# Patient Record
Sex: MALE | Race: WHITE | Employment: UNEMPLOYED | URBAN - NONMETROPOLITAN AREA
[De-identification: names, ages, dates, MRNs, and addresses within clinical notes are randomized per-mention and may not be internally consistent; named-entity substitution may affect disease eponyms.]

---

## 2022-01-31 ENCOUNTER — OFFICE VISIT (OUTPATIENT)
Dept: FAMILY MEDICINE CLINIC | Age: 52
End: 2022-01-31
Payer: COMMERCIAL

## 2022-01-31 VITALS
HEIGHT: 72 IN | TEMPERATURE: 97.7 F | BODY MASS INDEX: 40.36 KG/M2 | WEIGHT: 298 LBS | DIASTOLIC BLOOD PRESSURE: 96 MMHG | HEART RATE: 93 BPM | OXYGEN SATURATION: 97 % | SYSTOLIC BLOOD PRESSURE: 148 MMHG

## 2022-01-31 DIAGNOSIS — I10 PRIMARY HYPERTENSION: ICD-10-CM

## 2022-01-31 DIAGNOSIS — E78.5 HYPERLIPIDEMIA, UNSPECIFIED HYPERLIPIDEMIA TYPE: ICD-10-CM

## 2022-01-31 DIAGNOSIS — G89.29 CHRONIC BACK PAIN, UNSPECIFIED BACK LOCATION, UNSPECIFIED BACK PAIN LATERALITY: ICD-10-CM

## 2022-01-31 DIAGNOSIS — G47.33 OSA ON CPAP: ICD-10-CM

## 2022-01-31 DIAGNOSIS — F33.41 RECURRENT MAJOR DEPRESSIVE DISORDER, IN PARTIAL REMISSION (HCC): ICD-10-CM

## 2022-01-31 DIAGNOSIS — M54.9 CHRONIC BACK PAIN, UNSPECIFIED BACK LOCATION, UNSPECIFIED BACK PAIN LATERALITY: ICD-10-CM

## 2022-01-31 DIAGNOSIS — Z79.4 TYPE 2 DIABETES MELLITUS WITHOUT COMPLICATION, WITH LONG-TERM CURRENT USE OF INSULIN (HCC): Primary | ICD-10-CM

## 2022-01-31 DIAGNOSIS — E11.9 TYPE 2 DIABETES MELLITUS WITHOUT COMPLICATION, WITH LONG-TERM CURRENT USE OF INSULIN (HCC): Primary | ICD-10-CM

## 2022-01-31 DIAGNOSIS — Z12.11 SCREENING FOR MALIGNANT NEOPLASM OF COLON: ICD-10-CM

## 2022-01-31 DIAGNOSIS — Z99.89 OSA ON CPAP: ICD-10-CM

## 2022-01-31 LAB — HBA1C MFR BLD: 6.5 %

## 2022-01-31 PROCEDURE — 83036 HEMOGLOBIN GLYCOSYLATED A1C: CPT | Performed by: FAMILY MEDICINE

## 2022-01-31 PROCEDURE — 99204 OFFICE O/P NEW MOD 45 MIN: CPT | Performed by: FAMILY MEDICINE

## 2022-01-31 RX ORDER — DICLOFENAC POTASSIUM 50 MG/1
50 TABLET, FILM COATED ORAL 2 TIMES DAILY
Qty: 180 TABLET | Refills: 1 | Status: SHIPPED | OUTPATIENT
Start: 2022-01-31

## 2022-01-31 RX ORDER — LISINOPRIL 20 MG/1
20 TABLET ORAL DAILY
Qty: 90 TABLET | Refills: 1 | Status: SHIPPED | OUTPATIENT
Start: 2022-01-31 | End: 2022-02-16 | Stop reason: SDUPTHER

## 2022-01-31 RX ORDER — ATORVASTATIN CALCIUM 80 MG/1
80 TABLET, FILM COATED ORAL DAILY
COMMUNITY
End: 2022-01-31 | Stop reason: SDUPTHER

## 2022-01-31 RX ORDER — ATORVASTATIN CALCIUM 80 MG/1
80 TABLET, FILM COATED ORAL DAILY
Qty: 90 TABLET | Refills: 1 | Status: SHIPPED | OUTPATIENT
Start: 2022-01-31 | End: 2022-08-08

## 2022-01-31 RX ORDER — DICLOFENAC POTASSIUM 50 MG/1
50 TABLET, FILM COATED ORAL 2 TIMES DAILY
COMMUNITY
End: 2022-01-31 | Stop reason: SDUPTHER

## 2022-01-31 ASSESSMENT — PATIENT HEALTH QUESTIONNAIRE - PHQ9
SUM OF ALL RESPONSES TO PHQ QUESTIONS 1-9: 22
3. TROUBLE FALLING OR STAYING ASLEEP: 3
6. FEELING BAD ABOUT YOURSELF - OR THAT YOU ARE A FAILURE OR HAVE LET YOURSELF OR YOUR FAMILY DOWN: 3
SUM OF ALL RESPONSES TO PHQ QUESTIONS 1-9: 24
SUM OF ALL RESPONSES TO PHQ9 QUESTIONS 1 & 2: 6
SUM OF ALL RESPONSES TO PHQ QUESTIONS 1-9: 24
9. THOUGHTS THAT YOU WOULD BE BETTER OFF DEAD, OR OF HURTING YOURSELF: 2
7. TROUBLE CONCENTRATING ON THINGS, SUCH AS READING THE NEWSPAPER OR WATCHING TELEVISION: 3
2. FEELING DOWN, DEPRESSED OR HOPELESS: 3
1. LITTLE INTEREST OR PLEASURE IN DOING THINGS: 3
4. FEELING TIRED OR HAVING LITTLE ENERGY: 3
10. IF YOU CHECKED OFF ANY PROBLEMS, HOW DIFFICULT HAVE THESE PROBLEMS MADE IT FOR YOU TO DO YOUR WORK, TAKE CARE OF THINGS AT HOME, OR GET ALONG WITH OTHER PEOPLE: 3
5. POOR APPETITE OR OVEREATING: 2
8. MOVING OR SPEAKING SO SLOWLY THAT OTHER PEOPLE COULD HAVE NOTICED. OR THE OPPOSITE, BEING SO FIGETY OR RESTLESS THAT YOU HAVE BEEN MOVING AROUND A LOT MORE THAN USUAL: 2
SUM OF ALL RESPONSES TO PHQ QUESTIONS 1-9: 24

## 2022-01-31 ASSESSMENT — COLUMBIA-SUICIDE SEVERITY RATING SCALE - C-SSRS
2. HAVE YOU ACTUALLY HAD ANY THOUGHTS OF KILLING YOURSELF?: NO
1. WITHIN THE PAST MONTH, HAVE YOU WISHED YOU WERE DEAD OR WISHED YOU COULD GO TO SLEEP AND NOT WAKE UP?: YES
6. HAVE YOU EVER DONE ANYTHING, STARTED TO DO ANYTHING, OR PREPARED TO DO ANYTHING TO END YOUR LIFE?: NO

## 2022-01-31 NOTE — PROGRESS NOTES
Luis PAZ [address]  Dept: [phone]  Dept Fax: [phone]    Visit type: New patient    Reason for Visit: Establish Care, Referral - General (GI-Due for ClnScope), Sleep Apnea (needs new machine/mask), and Diabetes         Assessment and Plan       1. Type 2 diabetes mellitus without complication, with long-term current use of insulin (HCC)  -     insulin 70-30 (HUMULIN;NOVOLIN) (70-30) 100 UNIT per ML injection vial; Inject 100 Units into the skin 2 times daily, Disp-10 mL, R-5Normal  -     POCT glycosylated hemoglobin (Hb A1C)  2. Primary hypertension  -     lisinopril (PRINIVIL;ZESTRIL) 20 MG tablet; Take 1 tablet by mouth daily, Disp-90 tablet, R-1Normal  3. Hyperlipidemia, unspecified hyperlipidemia type  -     atorvastatin (LIPITOR) 80 MG tablet; Take 1 tablet by mouth daily, Disp-90 tablet, R-1Normal  4. Chronic back pain, unspecified back location, unspecified back pain laterality  -     diclofenac (CATAFLAM) 50 MG tablet; Take 1 tablet by mouth 2 times daily, Disp-180 tablet, R-1Normal  5. LI on CPAP  -     Misc. Devices MISC; ONCE Starting Mon 1/31/2022, For 1 dose, Disp-1 each, R-5, Print  6. Recurrent major depressive disorder, in partial remission ([facility] [address]  7. Screening for malignant neoplasm of colon  -     Lorenzo Mcgowan MD, Gastroenterology, [place]    Discussed with patient his point-of-care A1c was 6.5 and with him tolerating and doing well with his 70/30 regiment and wishes to continue that we will do so at this time but discussed potential difficulties with the use of that medication and benefits of separate short acting and long-acting insulin if his diabetes becomes more difficult to control and understanding was voiced. Stressed importance of being compliant with his diabetic diet.   Discussed need for continued home monitoring of his blood pressure and discussed his blood pressure today in office and the possible need for medication adjustments moving forward. With patient benefiting from his diclofenac will continue at this time and continue to monitor. With patient being compliant and benefiting from his use of his CPAP for his obstructive sleep apnea, I would recommend continued use of his CPAP machine as it is providing him benefit to his sleep apnea. Discussed his mood and depression and the possibility of medication efforts to improve his symptoms as well as the possibility of evolving psychologist and/or other psychiatric specialist in his care and patient declined at this time as is wanting continue to monitor. Discussed signs symptoms require medical attention. All questions were answered and patient voiced understanding and agreement with plan as discussed. Return in about 3 months (around 4/30/2022), or if symptoms worsen or fail to improve. Subjective       HPI   Patient reports that he used to live in [place] but moved here about 3 months ago. He states that he had to sell his house due to the cost of living in the expenses and because of that has been more down and depressed but states that he is doing okay and believes is more of just an adjustment. Than anything else. He does state that has been a lot of depression medication in the past and nothing is worked well for him and he is not interested in pursuing any medication at this time for management of his mood. He states that he feels like he is leveled out and is doing okay. He does state that he has a history of sleep apnea and is in need of a new mask and tubing. He states that he is pretty much is using the mask and tubing that he has been using for a year or longer and is starting to wear out. He reports that he is compliant with his CPAP machine use and is using it nightly and does benefit from the use of the machine.   He states that he is able to wake up feeling rested and denies any daytime somnolence with the use of the CPAP machine. Patient has a history of type 2 diabetes and is doing well with his current medication. He states that he is using 70/30 insulin and has been able to control his diabetes without any problems. He does state that he has to be diligent with his diet and when he overdoes it his blood sugars will run a little bit high for a day or so but then will more so normalize with continued use of his medicine. he denies any issues with the use of the medicine. he denies any new symptoms associated with his diabetes. he is attempting to be diligent with his diet and compliant with his medication. Patient has arterial hypertension that is doing well with his current medication. he denies any issues with use of his  medicine. he denies any symptoms associated with abnormal blood pressures. he is attempting to avoid excess salt intake. Patient has hyperlipidemia and reports that he is tolerating his Lipitor without any new myalgias or GI upsets. he is attempting to watch his diet and trying to stay physically active. He does have chronic back pain and states that he has been taking diclofenac and that has done well for his back pain symptoms. He denies any problems with medicine or any new injuries or recent changes to his back pain. Review of Systems   Constitutional: Negative for activity change, appetite change, fatigue and fever. HENT: Negative for congestion, rhinorrhea and sore throat. Eyes: Negative for pain and discharge. Respiratory: Negative for cough, shortness of breath and wheezing. Cardiovascular: Negative for chest pain and palpitations. Gastrointestinal: Negative for abdominal pain, constipation, diarrhea, nausea and vomiting. Endocrine: Negative for cold intolerance and heat intolerance. Genitourinary: Negative for dysuria and hematuria. Musculoskeletal: Positive for back pain. Negative for gait problem and neck pain. Skin: Negative for rash and wound. Neurological: Negative for syncope and weakness. Hematological: Negative for adenopathy. Does not bruise/bleed easily. Psychiatric/Behavioral: Positive for dysphoric mood. Negative for sleep disturbance. The patient is not nervous/anxious. No Known Allergies    Outpatient Medications Prior to Visit   Medication Sig Dispense Refill    insulin 70-30 (HUMULIN;NOVOLIN) (70-30) 100 UNIT per ML injection vial Inject 100 Units into the skin 2 times daily      atorvastatin (LIPITOR) 80 MG tablet Take 80 mg by mouth daily      diclofenac (CATAFLAM) 50 MG tablet Take 50 mg by mouth 2 times daily       No facility-administered medications prior to visit. Past Medical History:   Diagnosis Date    Anxiety     Depression     Neuropathy     Obstructive sleep apnea syndrome     Panic attacks     Primary hypertension     Type 2 diabetes mellitus without complication, with long-term current use of insulin ([facility])         Social History     Tobacco Use    Smoking status: Never Smoker    Smokeless tobacco: Never Used   Substance Use Topics    Alcohol use: Not Currently        History reviewed. No pertinent surgical history. No family history on file. Objective       BP (!) 148/96 (Site: Left Upper Arm, Position: Sitting, Cuff Size: Large Adult)   Pulse 93   Temp 97.7 °F (36.5 °C) (Temporal)   Ht 6' (1.829 m)   Wt 298 lb (135.2 kg)   SpO2 97%   BMI 40.42 kg/m²   Physical Exam  Vitals and nursing note reviewed. Constitutional:       General: He is not in acute distress. Appearance: Normal appearance. He is well-developed. He is not diaphoretic. HENT:      Head: Normocephalic and atraumatic. Right Ear: External ear normal.      Left Ear: External ear normal.      Mouth/Throat:      Comments: Mask in place  Eyes:      General: No scleral icterus. Right eye: No discharge. Left eye: No discharge.       Conjunctiva/sclera: Conjunctivae normal.   Neck:      Trachea: No tracheal deviation. Cardiovascular:      Rate and Rhythm: Normal rate and regular rhythm. Heart sounds: Normal heart sounds. No murmur heard. No friction rub. No gallop. Pulmonary:      Effort: Pulmonary effort is normal. No respiratory distress. Breath sounds: Normal breath sounds. No wheezing or rales. Abdominal:      General: Bowel sounds are normal. There is no distension. Palpations: Abdomen is soft. Tenderness: There is no abdominal tenderness. Musculoskeletal:         General: No deformity (No gross deformities of upper or lower extremities) or signs of injury. Cervical back: Normal range of motion and neck supple. No muscular tenderness. Right lower leg: No edema. Left lower leg: No edema. Comments: No appreciable spinal tenderness   Lymphadenopathy:      Cervical: No cervical adenopathy. Skin:     General: Skin is warm and dry. Findings: No erythema or rash. Neurological:      Mental Status: He is alert and oriented to person, place, and time. Cranial Nerves: No cranial nerve deficit. Psychiatric:         Behavior: Behavior normal.         Thought Content:  Thought content normal.           Data Reviewed and Summarized       Labs:     Imaging/Testing:        Werner Campbell MD

## 2022-02-04 PROBLEM — F33.41 RECURRENT MAJOR DEPRESSIVE DISORDER, IN PARTIAL REMISSION (HCC): Status: ACTIVE | Noted: 2022-02-04

## 2022-02-04 PROBLEM — E11.9 TYPE 2 DIABETES MELLITUS WITHOUT COMPLICATION, WITH LONG-TERM CURRENT USE OF INSULIN (HCC): Status: ACTIVE | Noted: 2022-02-04

## 2022-02-04 PROBLEM — Z99.89 OSA ON CPAP: Status: ACTIVE | Noted: 2022-02-04

## 2022-02-04 PROBLEM — M54.9 CHRONIC BACK PAIN: Status: ACTIVE | Noted: 2022-02-04

## 2022-02-04 PROBLEM — G89.29 CHRONIC BACK PAIN: Status: ACTIVE | Noted: 2022-02-04

## 2022-02-04 PROBLEM — G47.33 OSA ON CPAP: Status: ACTIVE | Noted: 2022-02-04

## 2022-02-04 PROBLEM — I10 PRIMARY HYPERTENSION: Status: ACTIVE | Noted: 2022-02-04

## 2022-02-04 PROBLEM — Z79.4 TYPE 2 DIABETES MELLITUS WITHOUT COMPLICATION, WITH LONG-TERM CURRENT USE OF INSULIN (HCC): Status: ACTIVE | Noted: 2022-02-04

## 2022-02-04 PROBLEM — E78.5 HYPERLIPIDEMIA: Status: ACTIVE | Noted: 2022-02-04

## 2022-02-04 ASSESSMENT — ENCOUNTER SYMPTOMS
BACK PAIN: 1
EYE PAIN: 0
RHINORRHEA: 0
SORE THROAT: 0
VOMITING: 0
SHORTNESS OF BREATH: 0
DIARRHEA: 0
COUGH: 0
EYE DISCHARGE: 0
WHEEZING: 0
ABDOMINAL PAIN: 0
NAUSEA: 0
CONSTIPATION: 0

## 2022-02-04 NOTE — PATIENT INSTRUCTIONS

## 2022-02-09 ENCOUNTER — PATIENT MESSAGE (OUTPATIENT)
Dept: FAMILY MEDICINE CLINIC | Age: 52
End: 2022-02-09

## 2022-02-09 DIAGNOSIS — E11.9 TYPE 2 DIABETES MELLITUS WITHOUT COMPLICATION, WITH LONG-TERM CURRENT USE OF INSULIN (HCC): ICD-10-CM

## 2022-02-09 DIAGNOSIS — Z79.4 TYPE 2 DIABETES MELLITUS WITHOUT COMPLICATION, WITH LONG-TERM CURRENT USE OF INSULIN (HCC): ICD-10-CM

## 2022-02-10 NOTE — TELEPHONE ENCOUNTER
Last OV 1/31/2022  Next OV Visit date not found      Requested Prescriptions     Pending Prescriptions Disp Refills    insulin 70-30 (HUMULIN;NOVOLIN) (70-30) 100 UNIT per ML injection vial 6 each 5     Sig: Inject 100 Units into the skin 2 times daily

## 2022-02-10 NOTE — TELEPHONE ENCOUNTER
From: Mitra Camp  To: Dr. Tate Chester  Sent: 2/9/2022 11:12 AM CST  Subject: Insulin prescription     Please check my insulin. Prescription. I need 200 a day. ..they only gave me 3 vials. ..need a preauthorzation? For more? [facility]    Also can I get a dexcom device? Thanks!

## 2022-02-14 ENCOUNTER — TELEPHONE (OUTPATIENT)
Dept: FAMILY MEDICINE CLINIC | Age: 52
End: 2022-02-14

## 2022-02-14 DIAGNOSIS — Z11.52 ENCOUNTER FOR SCREENING FOR COVID-19: Primary | ICD-10-CM

## 2022-02-14 DIAGNOSIS — E11.9 TYPE 2 DIABETES MELLITUS WITHOUT COMPLICATION, WITH LONG-TERM CURRENT USE OF INSULIN (HCC): Primary | ICD-10-CM

## 2022-02-14 DIAGNOSIS — Z79.4 TYPE 2 DIABETES MELLITUS WITHOUT COMPLICATION, WITH LONG-TERM CURRENT USE OF INSULIN (HCC): Primary | ICD-10-CM

## 2022-02-14 RX ORDER — BLOOD-GLUCOSE,RECEIVER,CONT
1 EACH MISCELLANEOUS ONCE
Qty: 1 EACH | Refills: 0 | Status: SHIPPED | OUTPATIENT
Start: 2022-02-14 | End: 2022-02-14

## 2022-02-14 RX ORDER — BLOOD-GLUCOSE TRANSMITTER
1 EACH MISCELLANEOUS CONTINUOUS
Qty: 3 EACH | Refills: 3 | Status: SHIPPED | OUTPATIENT
Start: 2022-02-14 | End: 2022-02-16 | Stop reason: SDUPTHER

## 2022-02-14 RX ORDER — BLOOD-GLUCOSE SENSOR
EACH MISCELLANEOUS
Qty: 3 EACH | Refills: 3 | Status: SHIPPED | OUTPATIENT
Start: 2022-02-14 | End: 2022-02-16 | Stop reason: SDUPTHER

## 2022-02-14 RX ORDER — SEMAGLUTIDE 1.34 MG/ML
0.5 INJECTION, SOLUTION SUBCUTANEOUS WEEKLY
Qty: 4 PEN | Refills: 3 | Status: SHIPPED | OUTPATIENT
Start: 2022-02-14

## 2022-02-15 ENCOUNTER — TELEPHONE (OUTPATIENT)
Dept: FAMILY MEDICINE CLINIC | Age: 52
End: 2022-02-15

## 2022-02-15 NOTE — TELEPHONE ENCOUNTER
Per CoverMyMeds, Novolin formulary alternatives include NovoLOG 70/30 and Humalog 75/25. Pt states he has never tried a formulary alternative but would like to continue with Novolin. PA initiated, awaiting response. Pt also states he may need a PA for Ozempic. PA initiated with this message from his insurance --    Message from Plan  The member recently filled this medication and will be able to return for their next refill according to their plan limits. Ozempic should not need a PA moving forward.

## 2022-02-16 DIAGNOSIS — I10 PRIMARY HYPERTENSION: ICD-10-CM

## 2022-02-16 DIAGNOSIS — E11.9 TYPE 2 DIABETES MELLITUS WITHOUT COMPLICATION, WITH LONG-TERM CURRENT USE OF INSULIN (HCC): ICD-10-CM

## 2022-02-16 DIAGNOSIS — Z79.4 TYPE 2 DIABETES MELLITUS WITHOUT COMPLICATION, WITH LONG-TERM CURRENT USE OF INSULIN (HCC): ICD-10-CM

## 2022-02-17 RX ORDER — BLOOD-GLUCOSE TRANSMITTER
1 EACH MISCELLANEOUS CONTINUOUS
Qty: 3 EACH | Refills: 3 | Status: SHIPPED | OUTPATIENT
Start: 2022-02-17

## 2022-02-17 RX ORDER — BLOOD-GLUCOSE SENSOR
EACH MISCELLANEOUS
Qty: 3 EACH | Refills: 3 | Status: SHIPPED | OUTPATIENT
Start: 2022-02-17

## 2022-02-17 RX ORDER — LISINOPRIL 20 MG/1
20 TABLET ORAL DAILY
Qty: 90 TABLET | Refills: 1 | Status: SHIPPED | OUTPATIENT
Start: 2022-02-17 | End: 2022-03-09 | Stop reason: SDUPTHER

## 2022-02-17 NOTE — TELEPHONE ENCOUNTER
Novolin Approved --     Approved on February 16  PA Case: [id number], Status: Approved,   Coverage Starts on: 2/16/2022 12:00:00 AM,   Coverage Ends on: 2/16/2023 12:00:00 AM.    Pt notified via [address]

## 2022-02-21 ENCOUNTER — TELEPHONE (OUTPATIENT)
Dept: FAMILY MEDICINE CLINIC | Age: 52
End: 2022-02-21

## 2022-02-21 DIAGNOSIS — E11.9 TYPE 2 DIABETES MELLITUS WITHOUT COMPLICATION, WITH LONG-TERM CURRENT USE OF INSULIN (HCC): Primary | ICD-10-CM

## 2022-02-21 DIAGNOSIS — Z79.4 TYPE 2 DIABETES MELLITUS WITHOUT COMPLICATION, WITH LONG-TERM CURRENT USE OF INSULIN (HCC): Primary | ICD-10-CM

## 2022-02-21 RX ORDER — BLOOD-GLUCOSE,RECEIVER,CONT
EACH MISCELLANEOUS
Qty: 1 EACH | Refills: 0 | Status: SHIPPED | OUTPATIENT
Start: 2022-02-21

## 2022-02-21 NOTE — TELEPHONE ENCOUNTER
Fax received from [employer] requesting PA for [employer] supplies. PA initiated for , transmitter, and sensors. All approved for dates 2/21/22-2/21/23. Faxed forms and PA approvals back to [facility]. Attached.

## 2022-02-24 NOTE — TELEPHONE ENCOUNTER
Fax received from [employer] stating that Dexcom supplies were dispensed to patient at [address] [facility].

## 2022-03-01 ENCOUNTER — OFFICE VISIT (OUTPATIENT)
Dept: FAMILY MEDICINE CLINIC | Age: 52
End: 2022-03-01
Payer: COMMERCIAL

## 2022-03-01 VITALS
HEART RATE: 86 BPM | BODY MASS INDEX: 40.01 KG/M2 | OXYGEN SATURATION: 98 % | DIASTOLIC BLOOD PRESSURE: 96 MMHG | SYSTOLIC BLOOD PRESSURE: 144 MMHG | TEMPERATURE: 97.6 F | WEIGHT: 295 LBS

## 2022-03-01 DIAGNOSIS — M62.830 BACK MUSCLE SPASM: ICD-10-CM

## 2022-03-01 DIAGNOSIS — Z79.4 TYPE 2 DIABETES MELLITUS WITHOUT COMPLICATION, WITH LONG-TERM CURRENT USE OF INSULIN (HCC): ICD-10-CM

## 2022-03-01 DIAGNOSIS — M54.41 ACUTE RIGHT-SIDED LOW BACK PAIN WITH RIGHT-SIDED SCIATICA: Primary | ICD-10-CM

## 2022-03-01 DIAGNOSIS — E78.5 HYPERLIPIDEMIA, UNSPECIFIED HYPERLIPIDEMIA TYPE: ICD-10-CM

## 2022-03-01 DIAGNOSIS — I10 ESSENTIAL HYPERTENSION: ICD-10-CM

## 2022-03-01 DIAGNOSIS — E11.9 TYPE 2 DIABETES MELLITUS WITHOUT COMPLICATION, WITH LONG-TERM CURRENT USE OF INSULIN (HCC): ICD-10-CM

## 2022-03-01 LAB
CHP ED QC CHECK: NORMAL
GLUCOSE BLD-MCNC: 145 MG/DL

## 2022-03-01 PROCEDURE — 99214 OFFICE O/P EST MOD 30 MIN: CPT | Performed by: FAMILY MEDICINE

## 2022-03-01 PROCEDURE — 3044F HG A1C LEVEL LT 7.0%: CPT | Performed by: FAMILY MEDICINE

## 2022-03-01 PROCEDURE — 82962 GLUCOSE BLOOD TEST: CPT | Performed by: FAMILY MEDICINE

## 2022-03-01 PROCEDURE — 96372 THER/PROPH/DIAG INJ SC/IM: CPT | Performed by: FAMILY MEDICINE

## 2022-03-01 RX ORDER — METHOCARBAMOL 500 MG/1
500 TABLET, FILM COATED ORAL 4 TIMES DAILY
COMMUNITY
End: 2022-03-01 | Stop reason: SDUPTHER

## 2022-03-01 RX ORDER — METHOCARBAMOL 500 MG/1
500 TABLET, FILM COATED ORAL 4 TIMES DAILY PRN
Qty: 60 TABLET | Refills: 0 | Status: SHIPPED | OUTPATIENT
Start: 2022-03-01 | End: 2022-03-09 | Stop reason: SDUPTHER

## 2022-03-01 RX ORDER — GLUCOSAMINE HCL/CHONDROITIN SU 500-400 MG
CAPSULE ORAL
Qty: 100 STRIP | Refills: 2 | Status: SHIPPED | OUTPATIENT
Start: 2022-03-01

## 2022-03-01 RX ORDER — TRIAMCINOLONE ACETONIDE 40 MG/ML
80 INJECTION, SUSPENSION INTRA-ARTICULAR; INTRAMUSCULAR ONCE
Status: COMPLETED | OUTPATIENT
Start: 2022-03-01 | End: 2022-03-01

## 2022-03-01 RX ADMIN — TRIAMCINOLONE ACETONIDE 80 MG: 40 INJECTION, SUSPENSION INTRA-ARTICULAR; INTRAMUSCULAR at 13:52

## 2022-03-01 SDOH — ECONOMIC STABILITY: FOOD INSECURITY: WITHIN THE PAST 12 MONTHS, THE FOOD YOU BOUGHT JUST DIDN'T LAST AND YOU DIDN'T HAVE MONEY TO GET MORE.: NEVER TRUE

## 2022-03-01 SDOH — ECONOMIC STABILITY: FOOD INSECURITY: WITHIN THE PAST 12 MONTHS, YOU WORRIED THAT YOUR FOOD WOULD RUN OUT BEFORE YOU GOT MONEY TO BUY MORE.: NEVER TRUE

## 2022-03-01 ASSESSMENT — PATIENT HEALTH QUESTIONNAIRE - PHQ9
10. IF YOU CHECKED OFF ANY PROBLEMS, HOW DIFFICULT HAVE THESE PROBLEMS MADE IT FOR YOU TO DO YOUR WORK, TAKE CARE OF THINGS AT HOME, OR GET ALONG WITH OTHER PEOPLE: 0
2. FEELING DOWN, DEPRESSED OR HOPELESS: 0
SUM OF ALL RESPONSES TO PHQ QUESTIONS 1-9: 0
8. MOVING OR SPEAKING SO SLOWLY THAT OTHER PEOPLE COULD HAVE NOTICED. OR THE OPPOSITE, BEING SO FIGETY OR RESTLESS THAT YOU HAVE BEEN MOVING AROUND A LOT MORE THAN USUAL: 0
3. TROUBLE FALLING OR STAYING ASLEEP: 0
SUM OF ALL RESPONSES TO PHQ QUESTIONS 1-9: 0
6. FEELING BAD ABOUT YOURSELF - OR THAT YOU ARE A FAILURE OR HAVE LET YOURSELF OR YOUR FAMILY DOWN: 0
7. TROUBLE CONCENTRATING ON THINGS, SUCH AS READING THE NEWSPAPER OR WATCHING TELEVISION: 0
9. THOUGHTS THAT YOU WOULD BE BETTER OFF DEAD, OR OF HURTING YOURSELF: 0
1. LITTLE INTEREST OR PLEASURE IN DOING THINGS: 0
4. FEELING TIRED OR HAVING LITTLE ENERGY: 0
SUM OF ALL RESPONSES TO PHQ QUESTIONS 1-9: 0
SUM OF ALL RESPONSES TO PHQ9 QUESTIONS 1 & 2: 0
5. POOR APPETITE OR OVEREATING: 0
SUM OF ALL RESPONSES TO PHQ QUESTIONS 1-9: 0

## 2022-03-01 ASSESSMENT — SOCIAL DETERMINANTS OF HEALTH (SDOH): HOW HARD IS IT FOR YOU TO PAY FOR THE VERY BASICS LIKE FOOD, HOUSING, MEDICAL CARE, AND HEATING?: NOT HARD AT ALL

## 2022-03-01 NOTE — PROGRESS NOTES
After obtaining consent, and per orders of Dr. Zackery Cramer, injection of Kenalog 80mg administered IM in RDG by Gold Morocho. Patient tolerated well.

## 2022-03-01 NOTE — PROGRESS NOTES
questions were answered and patient voiced understanding and agreement with plan as discussed. Return in about 3 months (around 6/1/2022), or if symptoms worsen or fail to improve. Subjective       HPI   Patient reports he has been having some right-sided low back pain with sciatica involving the right leg that started baseline at end of Friday. He denies any specific injury into states his right leg went numb for about 4 days. He states it feels like a pinched nerve because he has had similar symptoms on the left side in the past.  He states he has been doing a little bit better with the Robaxin that he was previously prescribed but otherwise he denies any specific aggravating or relieving factors for symptoms. Patient has a history of type 2 diabetes and is doing well with his current medication and he feels like the Dexcom has made a difference to his diabetes control. he denies any issues with the use of the medicine. he denies any new symptoms associated with his diabetes. he is attempting to be diligent with his diet and compliant with his medication. Patient has arterial hypertension that is doing well with his current medication. he denies any issues with use of his  medicine. he denies any symptoms associated with abnormal blood pressures. he is attempting to avoid excess salt intake. Patient has hyperlipidemia and reports that he is tolerating his Lipitor without any new myalgias or GI upsets. he is attempting to watch his diet and trying to stay physically active. Review of Systems   Constitutional: Negative for activity change, appetite change, fatigue and fever. HENT: Negative for congestion, rhinorrhea and sore throat. Eyes: Negative for pain and discharge. Respiratory: Negative for cough, shortness of breath and wheezing. Cardiovascular: Negative for chest pain and palpitations.    Gastrointestinal: Negative for abdominal pain, constipation, diarrhea, nausea and vomiting. Endocrine: Negative for cold intolerance and heat intolerance. Genitourinary: Negative for dysuria and hematuria. Musculoskeletal: Positive for back pain and myalgias. Negative for gait problem and neck pain. Skin: Negative for rash and wound. Neurological: Negative for syncope and weakness. Hematological: Negative for adenopathy. Does not bruise/bleed easily. Psychiatric/Behavioral: Negative for dysphoric mood and sleep disturbance. The patient is not nervous/anxious. No Known Allergies    Outpatient Medications Prior to Visit   Medication Sig Dispense Refill    Continuous Blood Gluc  (DEXCOM G6 ) YUNG 1 device continuous route does not apply. 1 each 0    insulin 70-30 (HUMULIN;NOVOLIN) (70-30) 100 UNIT per ML injection vial Inject 100 Units into the skin 2 times daily 6 each 5    Continuous Blood Gluc Transmit (DEXCOM G6 TRANSMITTER) MISC 1 each by Does not apply route continuous 3 each 3    Continuous Blood Gluc Sensor (DEXCOM G6 SENSOR) MISC Change sensor every 10 days 3 each 3    lisinopril (PRINIVIL;ZESTRIL) 20 MG tablet Take 1 tablet by mouth daily 90 tablet 1    Semaglutide,0.25 or 0.5MG/DOS, (OZEMPIC, 0.25 OR 0.5 MG/DOSE,) 2 MG/1.5ML SOPN Inject 0.5 mg into the skin once a week 4 pen 3    atorvastatin (LIPITOR) 80 MG tablet Take 1 tablet by mouth daily 90 tablet 1    diclofenac (CATAFLAM) 50 MG tablet Take 1 tablet by mouth 2 times daily 180 tablet 1    methocarbamol (ROBAXIN) 500 MG tablet Take 500 mg by mouth 4 times daily      Misc. Devices MISC 1 each by Does not apply route once for 1 dose 1 each 5     No facility-administered medications prior to visit.         Past Medical History:   Diagnosis Date    Anxiety     Depression     Neuropathy     Obstructive sleep apnea syndrome     Panic attacks     Primary hypertension     Type 2 diabetes mellitus without complication, with long-term current use of insulin ([facility] [address]         Social History Tobacco Use    Smoking status: Never Smoker    Smokeless tobacco: Never Used   Substance Use Topics    Alcohol use: Not Currently        No past surgical history on file. No family history on file. Objective       BP (!) 144/96 (Site: Left Upper Arm)   Pulse 86   Temp 97.6 °F (36.4 °C)   Wt 295 lb (133.8 kg)   SpO2 98%   BMI 40.01 kg/m²   Physical Exam  Vitals and nursing note reviewed. Constitutional:       General: He is not in acute distress. Appearance: Normal appearance. He is well-developed. He is not diaphoretic. HENT:      Head: Normocephalic and atraumatic. Right Ear: External ear normal.      Left Ear: External ear normal.      Mouth/Throat:      Comments: Mask in place  Eyes:      General: No scleral icterus. Right eye: No discharge. Left eye: No discharge. Conjunctiva/sclera: Conjunctivae normal.   Neck:      Trachea: No tracheal deviation. Cardiovascular:      Rate and Rhythm: Normal rate and regular rhythm. Heart sounds: Normal heart sounds. No murmur heard. No friction rub. No gallop. Pulmonary:      Effort: Pulmonary effort is normal. No respiratory distress. Breath sounds: Normal breath sounds. No wheezing or rales. Abdominal:      General: Bowel sounds are normal. There is no distension. Palpations: Abdomen is soft. Tenderness: There is no abdominal tenderness. Musculoskeletal:         General: No deformity (No gross deformities of upper or lower extremities) or signs of injury. Cervical back: Normal range of motion and neck supple. No muscular tenderness. Right lower leg: No edema. Left lower leg: No edema. Comments: Muscle spasms on the right lower back. No other abnormalities noted on exam.   Lymphadenopathy:      Cervical: No cervical adenopathy. Skin:     General: Skin is warm and dry. Findings: No erythema or rash.    Neurological:      Mental Status: He is alert and oriented to person, place, and time. Cranial Nerves: No cranial nerve deficit. Psychiatric:         Behavior: Behavior normal.         Thought Content:  Thought content normal.           Data Reviewed and Summarized       Labs:     Imaging/Testing:        Ant Wolf MD

## 2022-03-09 ENCOUNTER — OFFICE VISIT (OUTPATIENT)
Dept: FAMILY MEDICINE CLINIC | Age: 52
End: 2022-03-09
Payer: COMMERCIAL

## 2022-03-09 ENCOUNTER — HOSPITAL ENCOUNTER (OUTPATIENT)
Dept: GENERAL RADIOLOGY | Age: 52
Discharge: HOME | End: 2022-03-09
Payer: COMMERCIAL

## 2022-03-09 VITALS
BODY MASS INDEX: 40.01 KG/M2 | WEIGHT: 295 LBS | DIASTOLIC BLOOD PRESSURE: 84 MMHG | SYSTOLIC BLOOD PRESSURE: 136 MMHG | OXYGEN SATURATION: 96 % | HEART RATE: 104 BPM | TEMPERATURE: 97.4 F

## 2022-03-09 DIAGNOSIS — M62.830 BACK MUSCLE SPASM: ICD-10-CM

## 2022-03-09 DIAGNOSIS — M54.41 LOW BACK PAIN WITH RIGHT-SIDED SCIATICA, UNSPECIFIED BACK PAIN LATERALITY, UNSPECIFIED CHRONICITY: ICD-10-CM

## 2022-03-09 DIAGNOSIS — M54.41 LOW BACK PAIN WITH RIGHT-SIDED SCIATICA, UNSPECIFIED BACK PAIN LATERALITY, UNSPECIFIED CHRONICITY: Primary | ICD-10-CM

## 2022-03-09 DIAGNOSIS — M79.2 NEURALGIA: ICD-10-CM

## 2022-03-09 DIAGNOSIS — I10 PRIMARY HYPERTENSION: ICD-10-CM

## 2022-03-09 PROCEDURE — 99214 OFFICE O/P EST MOD 30 MIN: CPT | Performed by: FAMILY MEDICINE

## 2022-03-09 PROCEDURE — 72100 X-RAY EXAM L-S SPINE 2/3 VWS: CPT

## 2022-03-09 RX ORDER — GABAPENTIN 300 MG/1
300 CAPSULE ORAL 3 TIMES DAILY
Qty: 90 CAPSULE | Refills: 0 | Status: SHIPPED | OUTPATIENT
Start: 2022-03-09 | End: 2022-05-02 | Stop reason: ALTCHOICE

## 2022-03-09 RX ORDER — METHOCARBAMOL 500 MG/1
500 TABLET, FILM COATED ORAL 4 TIMES DAILY PRN
Qty: 120 TABLET | Refills: 0 | Status: SHIPPED | OUTPATIENT
Start: 2022-03-09 | End: 2022-04-13 | Stop reason: SDUPTHER

## 2022-03-09 RX ORDER — LISINOPRIL 40 MG/1
40 TABLET ORAL DAILY
Qty: 90 TABLET | Refills: 1 | Status: SHIPPED | OUTPATIENT
Start: 2022-03-09 | End: 2022-09-06

## 2022-03-09 NOTE — PROGRESS NOTES
[facility]  [address] [id number]  Dept: [phone]  Dept Fax: [phone]    Visit type: Established patient    Reason for Visit: Follow-up (R lumbar pain & R leg numbness) and Hypertension (BP still running high)         Assessment and Plan       1. Low back pain with right-sided sciatica, unspecified back pain laterality, unspecified chronicity  -     XR LUMBAR SPINE (2-3 VIEWS); Future  2. Neuralgia  -     gabapentin (NEURONTIN) 300 MG capsule; Take 1 capsule by mouth 3 times daily for 30 days. , Disp-90 capsule, R-0Normal  3. Back muscle spasm  -     methocarbamol (ROBAXIN) 500 MG tablet; Take 1 tablet by mouth 4 times daily as needed (muscle spasm), Disp-120 tablet, R-0Normal  4. Primary hypertension  -     lisinopril (PRINIVIL;ZESTRIL) 40 MG tablet; Take 1 tablet by mouth daily, Disp-90 tablet, R-1Normal    Discussed the possibility of an x-ray for further evaluation of the possibility involving a specialist in his care for continued monitoring and management depending on the results of x-ray and symptoms moving forward. Discussed possibility of a trial of Neurontin in efforts to improve his symptoms. Discussed proper use of medication and potential side effects. Discussed the need to follow-up for further discussion of medication and for adjustments as needed. Discussed proper use of Robaxin and potential side effects. Stressed importance of being diligent with his blood pressure medication and continued on monitoring of his blood pressure. Discussed signs symptoms require medical attention. All questions were answered and patient voiced understanding and agreement with plan as discussed. Return if symptoms worsen or fail to improve, for next scheduled follow up with PCP. Subjective       HPI   Patient reports that he has been having right low back pain with right thigh numbness. He reports that the steroid shot did help but only lasted for a few days.   He states that otherwise has not experienced any aggravating or or alleviating factors for his symptoms. He denies any new injuries or recent changes. He does have arterial hypertension and reports his blood pressures have been running elevated. He denies any symptoms associated with his elevated blood pressure and denies any specific aggravating relieving factors for his blood pressure. He does continue to attempt avoid excess salt intake. Review of Systems   Constitutional: Negative for activity change, appetite change, fatigue and fever. HENT: Negative for congestion, rhinorrhea and sore throat. Eyes: Negative for pain and discharge. Respiratory: Negative for cough, shortness of breath and wheezing. Cardiovascular: Negative for chest pain and palpitations. Gastrointestinal: Negative for abdominal pain, constipation, diarrhea, nausea and vomiting. Endocrine: Negative for cold intolerance and heat intolerance. Genitourinary: Negative for dysuria and hematuria. Musculoskeletal: Positive for back pain and myalgias. Negative for gait problem and neck pain. Skin: Negative for rash and wound. Neurological: Negative for syncope and weakness. Hematological: Negative for adenopathy. Does not bruise/bleed easily. Psychiatric/Behavioral: Negative for dysphoric mood and sleep disturbance. The patient is not nervous/anxious. No Known Allergies    Outpatient Medications Prior to Visit   Medication Sig Dispense Refill    blood glucose monitor strips Test 3 times a day & as needed for symptoms of irregular blood glucose. Dispense sufficient amount for indicated testing frequency plus additional to accommodate PRN testing needs. 100 strip 2    Continuous Blood Gluc  (DEXCOM G6 ) YUNG 1 device continuous route does not apply.  1 each 0    insulin 70-30 (HUMULIN;NOVOLIN) (70-30) 100 UNIT per ML injection vial Inject 100 Units into the skin 2 times daily 6 each 5    Continuous Blood Gluc Transmit (DEXCOM G6 TRANSMITTER) MISC 1 each by Does not apply route continuous 3 each 3    Continuous Blood Gluc Sensor (DEXCOM G6 SENSOR) MISC Change sensor every 10 days 3 each 3    Semaglutide,0.25 or 0.5MG/DOS, (OZEMPIC, 0.25 OR 0.5 MG/DOSE,) 2 MG/1.5ML SOPN Inject 0.5 mg into the skin once a week 4 pen 3    atorvastatin (LIPITOR) 80 MG tablet Take 1 tablet by mouth daily 90 tablet 1    diclofenac (CATAFLAM) 50 MG tablet Take 1 tablet by mouth 2 times daily 180 tablet 1    methocarbamol (ROBAXIN) 500 MG tablet Take 1 tablet by mouth 4 times daily as needed (muscle spasm) 60 tablet 0    lisinopril (PRINIVIL;ZESTRIL) 20 MG tablet Take 1 tablet by mouth daily 90 tablet 1     No facility-administered medications prior to visit. Past Medical History:   Diagnosis Date    Anxiety     Depression     Neuropathy     Obstructive sleep apnea syndrome     Panic attacks     Primary hypertension     Type 2 diabetes mellitus without complication, with long-term current use of insulin ([facility])         Social History     Tobacco Use    Smoking status: Never Smoker    Smokeless tobacco: Never Used   Substance Use Topics    Alcohol use: Not Currently        Past Surgical History:   Procedure Laterality Date    COLONOSCOPY N/A 03/25/2022    Dr Vinicio Smith, int hemorrhoids Gr 2 wo bleeding, sub prep fair, 1 year recall       No family history on file. Objective       /84 (Site: Left Upper Arm)   Pulse 104   Temp 97.4 °F (36.3 °C)   Wt 295 lb (133.8 kg)   SpO2 96%   BMI 40.01 kg/m²   Physical Exam  Vitals and nursing note reviewed. Constitutional:       General: He is not in acute distress. Appearance: Normal appearance. He is well-developed. He is not diaphoretic. HENT:      Head: Normocephalic and atraumatic.       Right Ear: External ear normal.      Left Ear: External ear normal.      Mouth/Throat:      Comments: Mask in place  Eyes:      General: No scleral icterus. Right eye: No discharge. Left eye: No discharge. Conjunctiva/sclera: Conjunctivae normal.   Neck:      Trachea: No tracheal deviation. Cardiovascular:      Rate and Rhythm: Normal rate and regular rhythm. Heart sounds: Normal heart sounds. No murmur heard. No friction rub. No gallop. Pulmonary:      Effort: Pulmonary effort is normal. No respiratory distress. Breath sounds: Normal breath sounds. No wheezing or rales. Abdominal:      General: Bowel sounds are normal. There is no distension. Palpations: Abdomen is soft. Tenderness: There is no abdominal tenderness. Musculoskeletal:         General: No deformity (No gross deformities of upper or lower extremities) or signs of injury. Cervical back: Normal range of motion and neck supple. No muscular tenderness. Right lower leg: No edema. Left lower leg: No edema. Comments: Muscle spasms on the right lower back. No other abnormalities noted on exam.   Lymphadenopathy:      Cervical: No cervical adenopathy. Skin:     General: Skin is warm and dry. Findings: No erythema or rash. Neurological:      Mental Status: He is alert and oriented to person, place, and time. Cranial Nerves: No cranial nerve deficit. Psychiatric:         Behavior: Behavior normal.         Thought Content:  Thought content normal.           Data Reviewed and Summarized       Labs:     Imaging/Testing:        Jose A Betancourt MD

## 2022-03-10 ENCOUNTER — PATIENT MESSAGE (OUTPATIENT)
Dept: FAMILY MEDICINE CLINIC | Age: 52
End: 2022-03-10

## 2022-03-10 DIAGNOSIS — M54.41 LOW BACK PAIN WITH RIGHT-SIDED SCIATICA, UNSPECIFIED BACK PAIN LATERALITY, UNSPECIFIED CHRONICITY: ICD-10-CM

## 2022-03-10 DIAGNOSIS — M62.830 BACK MUSCLE SPASM: ICD-10-CM

## 2022-03-10 DIAGNOSIS — Z13.828 SCOLIOSIS CONCERN: Primary | ICD-10-CM

## 2022-03-17 ASSESSMENT — ENCOUNTER SYMPTOMS
BACK PAIN: 1
DIARRHEA: 0
EYE DISCHARGE: 0
ABDOMINAL PAIN: 0
EYE PAIN: 0
WHEEZING: 0
RHINORRHEA: 0
COUGH: 0
VOMITING: 0
SHORTNESS OF BREATH: 0
CONSTIPATION: 0
NAUSEA: 0
SORE THROAT: 0

## 2022-03-17 NOTE — PATIENT INSTRUCTIONS
Patient Education        Learning About How to Have a Healthy Back  What causes back pain? Back pain is often caused by overuse, strain, or injury. For example, people often hurt their backs playing sports or working in the yard, being jolted in a car accident, or lifting something too heavy. Aging plays a part too. Your bones and muscles tend to lose strength as you age, which makes injury more likely. The spongy discs between the bones of the spine (vertebrae) may suffer from wear and tear and no longer provide enough cushion between the bones. A disc that bulges or breaks open (herniated disc) can press on nerves, causing back pain. In some people, back pain is the result of arthritis, broken vertebrae caused by bone loss (osteoporosis), illness, or a spine problem. Although most people have back pain at one time or another, there are steps you can take to make it less likely. How can you have a healthy back? Reduce stress on your back through good posture   Slumping or slouching alone may not cause low back pain. But after the back has been strained or injured, bad posture can make pain worse. · Sleep in a position that maintains your back's normal curves and on a mattress that feels comfortable. Sleep on your side with a pillow between your knees, or sleep on your back with a pillow under your knees. These positions can reduce strain on your back. · Stand and sit up straight. \"Good posture\" generally means your ears, shoulders, and hips are in a straight line. · If you must stand for a long time, put one foot on a stool, ledge, or box. Switch feet every now and then. · Sit in a chair that is low enough to let you place both feet flat on the floor with both knees nearly level with your hips. If your chair or desk is too high, use a footrest to raise your knees. Place a small pillow, a rolled-up towel, or a lumbar roll in the curve of your back if you need extra support.   · Try a kneeling chair, which helps tilt your hips forward. This takes pressure off your lower back. · Try sitting on an exercise ball. It can rock from side to side, which helps keep your back loose. · When driving, keep your knees nearly level with your hips. Sit straight, and drive with both hands on the steering wheel. Your arms should be in a slightly bent position. Reduce stress on your back through careful lifting   · Squat down, bending at the hips and knees only. If you need to, put one knee to the floor and extend your other knee in front of you, bent at a right angle (half kneeling). · Press your chest straight forward. This helps keep your upper back straight while keeping a slight arch in your low back. · Hold the load as close to your body as possible, at the level of your belly button (navel). · Use your feet to change direction, taking small steps. · Lead with your hips as you change direction. Keep your shoulders in line with your hips as you move. · Set down your load carefully, squatting with your knees and hips only. Exercise and stretch your back   · Do some exercise on most days of the week, if your doctor says it is okay. You can walk, run, swim, or cycle. · Stretch your back muscles. Here are a few exercises to try:  ? Lie on your back, and gently pull one bent knee to your chest. Put that foot back on the floor, and then pull the other knee to your chest.  ? Do pelvic tilts. Lie on your back with your knees bent. Tighten your stomach muscles. Pull your belly button (navel) in and up toward your ribs. You should feel like your back is pressing to the floor and your hips and pelvis are slightly lifting off the floor. Hold for 6 seconds while breathing smoothly. ? Sit with your back flat against a wall. · Keep your core muscles strong. The muscles of your back, belly (abdomen), and buttocks support your spine. ? Pull in your belly and imagine pulling your navel toward your spine.  Hold this for 6 seconds, then relax. Remember to keep breathing normally as you tense your muscles. ? Do curl-ups. Always do them with your knees bent. Keep your low back on the floor, and curl your shoulders toward your knees using a smooth, slow motion. Keep your arms folded across your chest. If this bothers your neck, try putting your hands behind your neck (not your head), with your elbows spread apart. ? Lie on your back with your knees bent and your feet flat on the floor. Tighten your belly muscles, and then push with your feet and raise your buttocks up a few inches. Hold this position 6 seconds as you continue to breathe normally, then lower yourself slowly to the floor. Repeat 8 to 12 times. ? If you like group exercise, try Pilates or yoga. These classes have poses that strengthen the core muscles. Lead a healthy lifestyle   · Stay at a healthy weight to avoid strain on your back. · Do not smoke. Smoking increases the risk of osteoporosis, which weakens the spine. If you need help quitting, talk to your doctor about stop-smoking programs and medicines. These can increase your chances of quitting for good. Where can you learn more? Go to https://[employer].Employee Benefit Plans. org and sign in to your [employer] account. Enter L315 in the [employer] box to learn more about \"Learning About How to Have a Healthy Back. \"     If you do not have an account, please click on the \"Sign Up Now\" link. Current as of: July 1, 2021               Content Version: 13.1  © 2006-2021 Healthwise, Incorporated. Care instructions adapted under license by [facility] ([facility]). If you have questions about a medical condition or this instruction, always ask your healthcare professional. [place] [address] any warranty or liability for your use of this information.

## 2022-03-23 DIAGNOSIS — Z11.52 ENCOUNTER FOR SCREENING FOR COVID-19: ICD-10-CM

## 2022-03-23 LAB — SARS-COV-2, PCR: NOT DETECTED

## 2022-03-24 NOTE — TELEPHONE ENCOUNTER
From: Carson Aguiar  Sent: 3/23/2022 1:21 PM CDT  To: [address] [place] [address] [place] Clinical Staff  Subject: Question regarding XR Lumbar Spine    Yes can you set it up? Either place is fine!

## 2022-03-25 ENCOUNTER — HOSPITAL ENCOUNTER (OUTPATIENT)
Age: 52
Setting detail: OUTPATIENT SURGERY
Discharge: HOME | End: 2022-03-25
Attending: INTERNAL MEDICINE | Admitting: INTERNAL MEDICINE
Payer: COMMERCIAL

## 2022-03-25 ENCOUNTER — ANESTHESIA EVENT (OUTPATIENT)
Dept: ENDOSCOPY | Age: 52
End: 2022-03-25
Payer: COMMERCIAL

## 2022-03-25 ENCOUNTER — ANESTHESIA (OUTPATIENT)
Dept: ENDOSCOPY | Age: 52
End: 2022-03-25
Payer: COMMERCIAL

## 2022-03-25 VITALS — SYSTOLIC BLOOD PRESSURE: 223 MMHG | OXYGEN SATURATION: 80 % | DIASTOLIC BLOOD PRESSURE: 114 MMHG

## 2022-03-25 VITALS
WEIGHT: 287 LBS | HEART RATE: 86 BPM | OXYGEN SATURATION: 99 % | HEIGHT: 72 IN | DIASTOLIC BLOOD PRESSURE: 92 MMHG | SYSTOLIC BLOOD PRESSURE: 179 MMHG | TEMPERATURE: 98 F | RESPIRATION RATE: 18 BRPM | BODY MASS INDEX: 38.87 KG/M2

## 2022-03-25 LAB
GLUCOSE BLD-MCNC: 173 MG/DL (ref 70–99)
PERFORMED ON: ABNORMAL

## 2022-03-25 PROCEDURE — 3700000001 HC ADD 15 MINUTES (ANESTHESIA): Performed by: INTERNAL MEDICINE

## 2022-03-25 PROCEDURE — 45385 COLONOSCOPY W/LESION REMOVAL: CPT | Performed by: INTERNAL MEDICINE

## 2022-03-25 PROCEDURE — 2500000003 HC RX 250 WO HCPCS: Performed by: NURSE ANESTHETIST, CERTIFIED REGISTERED

## 2022-03-25 PROCEDURE — 3700000000 HC ANESTHESIA ATTENDED CARE: Performed by: INTERNAL MEDICINE

## 2022-03-25 PROCEDURE — 3609010600 HC COLONOSCOPY POLYPECTOMY SNARE/COLD BIOPSY: Performed by: INTERNAL MEDICINE

## 2022-03-25 PROCEDURE — 7100000011 HC PHASE II RECOVERY - ADDTL 15 MIN: Performed by: INTERNAL MEDICINE

## 2022-03-25 PROCEDURE — 2580000003 HC RX 258: Performed by: NURSE ANESTHETIST, CERTIFIED REGISTERED

## 2022-03-25 PROCEDURE — 7100000010 HC PHASE II RECOVERY - FIRST 15 MIN: Performed by: INTERNAL MEDICINE

## 2022-03-25 PROCEDURE — 2580000003 HC RX 258: Performed by: INTERNAL MEDICINE

## 2022-03-25 PROCEDURE — 2709999900 HC NON-CHARGEABLE SUPPLY: Performed by: INTERNAL MEDICINE

## 2022-03-25 PROCEDURE — 6360000002 HC RX W HCPCS: Performed by: NURSE ANESTHETIST, CERTIFIED REGISTERED

## 2022-03-25 PROCEDURE — 82947 ASSAY GLUCOSE BLOOD QUANT: CPT

## 2022-03-25 PROCEDURE — 88305 TISSUE EXAM BY PATHOLOGIST: CPT

## 2022-03-25 RX ORDER — SODIUM CHLORIDE 0.9 % (FLUSH) 0.9 %
5-40 SYRINGE (ML) INJECTION EVERY 12 HOURS SCHEDULED
Status: DISCONTINUED | OUTPATIENT
Start: 2022-03-25 | End: 2022-03-25 | Stop reason: HOSPADM

## 2022-03-25 RX ORDER — DIPHENHYDRAMINE HYDROCHLORIDE 50 MG/ML
12.5 INJECTION INTRAMUSCULAR; INTRAVENOUS
Status: DISCONTINUED | OUTPATIENT
Start: 2022-03-25 | End: 2022-03-25 | Stop reason: HOSPADM

## 2022-03-25 RX ORDER — ONDANSETRON 2 MG/ML
4 INJECTION INTRAMUSCULAR; INTRAVENOUS
Status: DISCONTINUED | OUTPATIENT
Start: 2022-03-25 | End: 2022-03-25 | Stop reason: HOSPADM

## 2022-03-25 RX ORDER — SODIUM CHLORIDE 0.9 % (FLUSH) 0.9 %
5-40 SYRINGE (ML) INJECTION PRN
Status: DISCONTINUED | OUTPATIENT
Start: 2022-03-25 | End: 2022-03-25 | Stop reason: HOSPADM

## 2022-03-25 RX ORDER — SODIUM CHLORIDE 9 MG/ML
25 INJECTION, SOLUTION INTRAVENOUS PRN
Status: DISCONTINUED | OUTPATIENT
Start: 2022-03-25 | End: 2022-03-25 | Stop reason: HOSPADM

## 2022-03-25 RX ORDER — PROPOFOL 10 MG/ML
INJECTION, EMULSION INTRAVENOUS PRN
Status: DISCONTINUED | OUTPATIENT
Start: 2022-03-25 | End: 2022-03-25 | Stop reason: SDUPTHER

## 2022-03-25 RX ORDER — SODIUM CHLORIDE, SODIUM LACTATE, POTASSIUM CHLORIDE, CALCIUM CHLORIDE 600; 310; 30; 20 MG/100ML; MG/100ML; MG/100ML; MG/100ML
INJECTION, SOLUTION INTRAVENOUS CONTINUOUS PRN
Status: DISCONTINUED | OUTPATIENT
Start: 2022-03-25 | End: 2022-03-25 | Stop reason: SDUPTHER

## 2022-03-25 RX ORDER — LIDOCAINE HYDROCHLORIDE 10 MG/ML
INJECTION, SOLUTION EPIDURAL; INFILTRATION; INTRACAUDAL; PERINEURAL PRN
Status: DISCONTINUED | OUTPATIENT
Start: 2022-03-25 | End: 2022-03-25 | Stop reason: SDUPTHER

## 2022-03-25 RX ORDER — SODIUM CHLORIDE, SODIUM LACTATE, POTASSIUM CHLORIDE, CALCIUM CHLORIDE 600; 310; 30; 20 MG/100ML; MG/100ML; MG/100ML; MG/100ML
INJECTION, SOLUTION INTRAVENOUS CONTINUOUS
Status: DISCONTINUED | OUTPATIENT
Start: 2022-03-25 | End: 2022-03-25 | Stop reason: HOSPADM

## 2022-03-25 RX ADMIN — PROPOFOL 400 MG: 10 INJECTION, EMULSION INTRAVENOUS at 11:58

## 2022-03-25 RX ADMIN — SODIUM CHLORIDE, SODIUM LACTATE, POTASSIUM CHLORIDE, AND CALCIUM CHLORIDE: 600; 310; 30; 20 INJECTION, SOLUTION INTRAVENOUS at 11:53

## 2022-03-25 RX ADMIN — LIDOCAINE HYDROCHLORIDE 50 MG: 10 INJECTION, SOLUTION EPIDURAL; INFILTRATION; INTRACAUDAL; PERINEURAL at 11:58

## 2022-03-25 RX ADMIN — SODIUM CHLORIDE, POTASSIUM CHLORIDE, SODIUM LACTATE AND CALCIUM CHLORIDE: 600; 310; 30; 20 INJECTION, SOLUTION INTRAVENOUS at 10:33

## 2022-03-25 ASSESSMENT — PAIN SCALES - GENERAL
PAINLEVEL_OUTOF10: 0

## 2022-03-25 ASSESSMENT — PAIN - FUNCTIONAL ASSESSMENT: PAIN_FUNCTIONAL_ASSESSMENT: 0-10

## 2022-03-25 ASSESSMENT — PAIN DESCRIPTION - DESCRIPTORS: DESCRIPTORS: ACHING

## 2022-03-25 NOTE — ANESTHESIA POSTPROCEDURE EVALUATION
Department of Anesthesiology  Postprocedure Note    Patient: Ze Alexander  MRN: [id number]  [place]: 1970  Date of evaluation: 3/25/2022  Time:  12:21 PM     Procedure Summary     Date: 03/25/22 Room / Location: [address]    Anesthesia Start: 8196 Anesthesia Stop: 7243    Procedure: COLORECTAL CANCER SCREENING, NOT HIGH RISK (N/A ) Diagnosis: (FAM HX CLN CA)    Surgeons: Luis Monteiro MD Responsible Provider: KIET Álvarez CRNA    Anesthesia Type: general ASA Status: 3          Anesthesia Type: general    Tamara Phase I:      Tamara Phase II:      Last vitals: Reviewed and per EMR flowsheets.        Anesthesia Post Evaluation    Patient location during evaluation: bedside  Patient participation: complete - patient participated  Level of consciousness: sleepy but conscious  Pain score: 0  Airway patency: patent  Nausea & Vomiting: no nausea and no vomiting  Complications: no  Cardiovascular status: hemodynamically stable  Respiratory status: acceptable  Hydration status: euvolemic

## 2022-03-25 NOTE — H&P
Patient Name: Scotty Ozuna  : 1970  MRN: [id number]  DATE: 22    Allergies: No Known Allergies     ENDOSCOPY  History and Physical    Procedure:    [] Diagnostic Colonoscopy       [x] Screening Colonoscopy  [] EGD      [] ERCP      [] EUS       [] Other    [x] Previous office notes/History and Physical reviewed from the patients chart. Please see EMR for further details of HPI. I have examined the patient's status immediately prior to the procedure and:      Indications/HPI:    []Abdominal Pain   []Cancer- GI/Lung     [x]Fhx of colon CA/polyps  []History of Polyps  []Barretts            []Melena  []Abnormal Imaging              []Dysphagia              []Persistent Pneumonia   []Anemia                            []Food Impaction        []History of Polyps  [] GI Bleed             []Pulmonary nodule/Mass   []Change in bowel habits []Heartburn/Reflux  []Rectal Bleed (BRBPR)  []Chest Pain - Non Cardiac []Heme (+) Stool []Ulcers  []Constipation  []Hemoptysis  []Varices  []Diarrhea  []Hypoxemia    []Nausea/Vomiting   [x]Screening   []Crohns/Colitis  []Other:     Anesthesia:   [x] MAC [] Moderate Sedation   [] General   [] None     ROS: 12 pt Review of Symptoms was negative unless mentioned above    Medications:   Prior to Admission medications    Medication Sig Start Date End Date Taking? Authorizing Provider   gabapentin (NEURONTIN) 300 MG capsule Take 1 capsule by mouth 3 times daily for 30 days. 3/9/22 4/8/22  Jolene Easton MD   methocarbamol (ROBAXIN) 500 MG tablet Take 1 tablet by mouth 4 times daily as needed (muscle spasm) 3/9/22   Jolene Easton MD   lisinopril (PRINIVIL;ZESTRIL) 40 MG tablet Take 1 tablet by mouth daily 3/9/22   Jolene Easton MD   blood glucose monitor strips Test 3 times a day & as needed for symptoms of irregular blood glucose. Dispense sufficient amount for indicated testing frequency plus additional to accommodate PRN testing needs.  3/1/22   Jolene Easton MD   Continuous Blood Gluc  (DEXCOM G6 ) YUNG 1 device continuous route does not apply. 2/21/22   Yaniv Quiroz MD   insulin 70-30 (HUMULIN;NOVOLIN) (70-30) 100 UNIT per ML injection vial Inject 100 Units into the skin 2 times daily 2/17/22   Yaniv Quiroz MD   Continuous Blood Gluc Transmit (DEXCOM G6 TRANSMITTER) MISC 1 each by Does not apply route continuous 2/17/22   Yaniv Quiroz MD   Continuous Blood Gluc Sensor (DEXCOM G6 SENSOR) MISC Change sensor every 10 days 2/17/22   Yaniv Quiroz MD   Semaglutide,0.25 or 0.5MG/DOS, (OZEMPIC, 0.25 OR 0.5 MG/DOSE,) 2 MG/1.5ML SOPN Inject 0.5 mg into the skin once a week 2/14/22   Yaniv Quiroz MD   atorvastatin (LIPITOR) 80 MG tablet Take 1 tablet by mouth daily 1/31/22   Yaniv Quiroz MD   diclofenac (CATAFLAM) 50 MG tablet Take 1 tablet by mouth 2 times daily 1/31/22   Yaniv Quiroz MD       Past Medical History:  Past Medical History:   Diagnosis Date    Anxiety     Depression     Neuropathy     Obstructive sleep apnea syndrome     Panic attacks     Primary hypertension     Type 2 diabetes mellitus without complication, with long-term current use of insulin ([facility] [address]        Past Surgical History:  History reviewed. No pertinent surgical history. Social History:  Social History     Tobacco Use    Smoking status: Never Smoker    Smokeless tobacco: Never Used   Substance Use Topics    Alcohol use: Not Currently    Drug use: Never       Vital Signs:   Vitals:    03/25/22 0958   BP: (!) 177/88   Pulse: 83   Resp: 16   Temp: 98 °F (36.7 °C)   SpO2: 95%        Physical Exam:  Cardiac:  [x]WNL  []Comments:  Pulmonary:  [x]WNL   []Comments:  Neuro/Mental Status:  [x]WNL  []Comments:  Abdominal:  [x]WNL    []Comments:  Other:   []WNL  []Comments:    Informed Consent:  The risks and benefits of the procedure have been discussed with either the patient or if they cannot consent, their representative. Assessment:  Patient examined and appropriate for planned sedation and procedure. Plan:  Proceed with planned sedation and procedure as above.          Umang Orr MD

## 2022-03-25 NOTE — ANESTHESIA PRE PROCEDURE
Department of Anesthesiology  Preprocedure Note       Name:  Scotty Ozuna   Age:  46 y.o.  :  1970                                          MRN:  [id number]         Date:  3/25/2022      Surgeon: Jesus Sherman):  Maribell Owen MD    Procedure: Procedure(s):  COLORECTAL CANCER SCREENING, NOT HIGH RISK    Medications prior to admission:   Prior to Admission medications    Medication Sig Start Date End Date Taking? Authorizing Provider   gabapentin (NEURONTIN) 300 MG capsule Take 1 capsule by mouth 3 times daily for 30 days. 3/9/22 4/8/22  Jolene Easton MD   methocarbamol (ROBAXIN) 500 MG tablet Take 1 tablet by mouth 4 times daily as needed (muscle spasm) 3/9/22   Jolene Easton MD   lisinopril (PRINIVIL;ZESTRIL) 40 MG tablet Take 1 tablet by mouth daily 3/9/22   Jolene Easton MD   blood glucose monitor strips Test 3 times a day & as needed for symptoms of irregular blood glucose. Dispense sufficient amount for indicated testing frequency plus additional to accommodate PRN testing needs. 3/1/22   Jolene Easton MD   Continuous Blood Gluc  (DEXCOM G6 ) YUNG 1 device continuous route does not apply.  22   Jolene Easton MD   insulin 70-30 (HUMULIN;NOVOLIN) (70-30) 100 UNIT per ML injection vial Inject 100 Units into the skin 2 times daily 22   Jolene Easton MD   Continuous Blood Gluc Transmit (DEXCOM G6 TRANSMITTER) MISC 1 each by Does not apply route continuous 22   Jolene Easton MD   Continuous Blood Gluc Sensor (DEXCOM G6 SENSOR) MISC Change sensor every 10 days 22   Jolene Easton MD   Semaglutide,0.25 or 0.5MG/DOS, (OZEMPIC, 0.25 OR 0.5 MG/DOSE,) 2 MG/1.5ML SOPN Inject 0.5 mg into the skin once a week 22   Jolene Easton MD   atorvastatin (LIPITOR) 80 MG tablet Take 1 tablet by mouth daily 22   Jolene Easton MD   diclofenac (CATAFLAM) 50 MG tablet Take 1 tablet by mouth 2 times daily 22   Jolene Easton MD       Current medications:    Current Facility-Administered Medications   Medication Dose Route Frequency Provider Last Rate Last Admin    lactated ringers infusion   IntraVENous Continuous Apolonia Chapin MD           Allergies:  No Known Allergies    Problem List:    Patient Active Problem List   Diagnosis Code    Type 2 diabetes mellitus without complication, with long-term current use of insulin ([facility]) E11.9, Z79.4    Primary hypertension I10    Hyperlipidemia E78.5    Chronic back pain M54.9, G89.29    LI on CPAP G47.33, Z99.89    Recurrent major depressive disorder, in partial remission ([facility] [address] F33.41       Past Medical History:        Diagnosis Date    Anxiety     Depression     Neuropathy     Obstructive sleep apnea syndrome     Panic attacks     Primary hypertension     Type 2 diabetes mellitus without complication, with long-term current use of insulin ([facility] [address]        Past Surgical History:  History reviewed. No pertinent surgical history. Social History:    Social History     Tobacco Use    Smoking status: Never Smoker    Smokeless tobacco: Never Used   Substance Use Topics    Alcohol use: Not Currently                                Counseling given: Not Answered      Vital Signs (Current):   Vitals:    03/25/22 0958   BP: (!) 177/88   Pulse: 83   Resp: 16   Temp: 98 °F (36.7 °C)   TempSrc: Tympanic   SpO2: 95%   Weight: 287 lb (130.2 kg)   Height: 6' (1.829 m)                                              BP Readings from Last 3 Encounters:   03/25/22 (!) 177/88   03/09/22 136/84   03/01/22 (!) 144/96       NPO Status: Time of last liquid consumption: 0900                        Time of last solid consumption: 1800                        Date of last liquid consumption: 03/25/22                        Date of last solid food consumption: 03/23/22    BMI:   Wt Readings from Last 3 Encounters:   03/25/22 287 lb (130.2 kg)   03/09/22 295 lb (133.8 kg)   03/01/22 295 lb (133.8 kg)     Body mass index is 38.92 kg/m².     CBC: No results found for: WBC, RBC, HGB, HCT, MCV, RDW, PLT    CMP:   Lab Results   Component Value Date    GLUCOSE 145 03/01/2022       POC Tests: No results for input(s): POCGLU, POCNA, POCK, POCCL, POCBUN, POCHEMO, POCHCT in the last 72 hours. Coags: No results found for: PROTIME, INR, APTT    HCG (If Applicable): No results found for: PREGTESTUR, PREGSERUM, HCG, HCGQUANT     ABGs: No results found for: PHART, PO2ART, MMD1PUI, ICK6VUF, BEART, Y8NETXTQ     Type & Screen (If Applicable):  No results found for: LABABO, LABRH    Drug/Infectious Status (If Applicable):  No results found for: HIV, HEPCAB    COVID-19 Screening (If Applicable):   Lab Results   Component Value Date    COVID19 Not Detected 03/23/2022           Anesthesia Evaluation  Patient summary reviewed and Nursing notes reviewed  Airway: Mallampati: II  TM distance: >3 FB   Neck ROM: full  Mouth opening: > = 3 FB Dental:          Pulmonary:normal exam    (+) sleep apnea: on CPAP,                             Cardiovascular:    (+) hypertension: no interval change, hyperlipidemia                  Neuro/Psych:   (+) psychiatric history: stable without treatmentdepression/anxiety             GI/Hepatic/Renal:             Endo/Other:    (+) DiabetesType II DM, , .                 Abdominal:             Vascular: Other Findings:             Anesthesia Plan      general     ASA 3       Induction: intravenous. Anesthetic plan and risks discussed with patient.                       KIET Lindsay - HEATHER   3/25/2022

## 2022-03-25 NOTE — OP NOTE
Patient: Elieser Fishman : 1970  Med Rec#: [id number] Acc#: [id number]   Primary Care Provider Barbara Zafar MD    Date of Procedure:  3/25/2022    Endoscopist: Dana Sosa MD, MD    Referring Provider: Barbara Zafar MD,     Operation Performed: Colonoscopy up to the terminal ileum with hot snare resection of multiple polyps    Indications: Family history of colon cancer and colon polyps; needs colorectal cancer screening    Anesthesia:  Sedation was administered by anesthesia who monitored the patient during the procedure. I met with Elieser Fishman prior to procedure. We discussed the procedure itself, and I have discussed the risks of endoscopy (including-- but not limited to-- pain, discomfort, bleeding potentially requiring second endoscopic procedure and/or blood transfusion, organ perforation requiring operative repair, damage to organs near the colon, infection, aspiration, cardiopulmonary/allergic reaction), benefits, indications to endoscopy. Additionally, we discussed options other than colonoscopy. The patient expressed understanding. All questions answered. The patient decided to proceed with the procedure. Signed informed consent was placed on the chart. Blood Loss: minimal    Withdrawal time: More than 6-minutes  Bowel Prep: Fair  with small amounts of thick semisolid stool and moderate amounts of thick, opaque liquid scattered in patchy segments throughout the colon obscuring the underlying mucosa. Lesions including polyps may have been missed. Complications: no immediate complications    DESCRIPTION OF PROCEDURE:     A time out was performed. After written informed consent was obtained, the patient was placed in the left lateral position. The perianal area was inspected, and a digital rectal exam was performed. A rectal exam was performed: normal tone, no palpable lesions.  At this point, a forward viewing Olympus colonoscope was inserted into the anus and carefully

## 2022-03-26 ASSESSMENT — ENCOUNTER SYMPTOMS
COUGH: 0
SORE THROAT: 0
VOMITING: 0
BACK PAIN: 1
EYE PAIN: 0
ABDOMINAL PAIN: 0
DIARRHEA: 0
NAUSEA: 0
RHINORRHEA: 0
EYE DISCHARGE: 0
SHORTNESS OF BREATH: 0
CONSTIPATION: 0
WHEEZING: 0

## 2022-03-27 NOTE — PATIENT INSTRUCTIONS
Patient Education        Learning About How to Have a Healthy Back  What causes back pain? Back pain is often caused by overuse, strain, or injury. For example, people often hurt their backs playing sports or working in the yard, being jolted in a car accident, or lifting something too heavy. Aging plays a part too. Your bones and muscles tend to lose strength as you age, which makes injury more likely. The spongy discs between the bones of the spine (vertebrae) may suffer from wear and tear and no longer provide enough cushion between the bones. A disc that bulges or breaks open (herniated disc) can press on nerves, causing back pain. In some people, back pain is the result of arthritis, broken vertebrae caused by bone loss (osteoporosis), illness, or a spine problem. Although most people have back pain at one time or another, there are steps you can take to make it less likely. How can you have a healthy back? Reduce stress on your back through good posture   Slumping or slouching alone may not cause low back pain. But after the back has been strained or injured, bad posture can make pain worse. · Sleep in a position that maintains your back's normal curves and on a mattress that feels comfortable. Sleep on your side with a pillow between your knees, or sleep on your back with a pillow under your knees. These positions can reduce strain on your back. · Stand and sit up straight. \"Good posture\" generally means your ears, shoulders, and hips are in a straight line. · If you must stand for a long time, put one foot on a stool, ledge, or box. Switch feet every now and then. · Sit in a chair that is low enough to let you place both feet flat on the floor with both knees nearly level with your hips. If your chair or desk is too high, use a footrest to raise your knees. Place a small pillow, a rolled-up towel, or a lumbar roll in the curve of your back if you need extra support.   · Try a kneeling chair, which helps tilt your hips forward. This takes pressure off your lower back. · Try sitting on an exercise ball. It can rock from side to side, which helps keep your back loose. · When driving, keep your knees nearly level with your hips. Sit straight, and drive with both hands on the steering wheel. Your arms should be in a slightly bent position. Reduce stress on your back through careful lifting   · Squat down, bending at the hips and knees only. If you need to, put one knee to the floor and extend your other knee in front of you, bent at a right angle (half kneeling). · Press your chest straight forward. This helps keep your upper back straight while keeping a slight arch in your low back. · Hold the load as close to your body as possible, at the level of your belly button (navel). · Use your feet to change direction, taking small steps. · Lead with your hips as you change direction. Keep your shoulders in line with your hips as you move. · Set down your load carefully, squatting with your knees and hips only. Exercise and stretch your back   · Do some exercise on most days of the week, if your doctor says it is okay. You can walk, run, swim, or cycle. · Stretch your back muscles. Here are a few exercises to try:  ? Lie on your back, and gently pull one bent knee to your chest. Put that foot back on the floor, and then pull the other knee to your chest.  ? Do pelvic tilts. Lie on your back with your knees bent. Tighten your stomach muscles. Pull your belly button (navel) in and up toward your ribs. You should feel like your back is pressing to the floor and your hips and pelvis are slightly lifting off the floor. Hold for 6 seconds while breathing smoothly. ? Sit with your back flat against a wall. · Keep your core muscles strong. The muscles of your back, belly (abdomen), and buttocks support your spine. ? Pull in your belly and imagine pulling your navel toward your spine.  Hold this for 6 seconds,

## 2022-04-13 ENCOUNTER — HOSPITAL ENCOUNTER (OUTPATIENT)
Dept: MRI IMAGING | Age: 52
Discharge: HOME | End: 2022-04-13
Payer: COMMERCIAL

## 2022-04-13 DIAGNOSIS — Z13.828 SCOLIOSIS CONCERN: ICD-10-CM

## 2022-04-13 DIAGNOSIS — M54.41 LOW BACK PAIN WITH RIGHT-SIDED SCIATICA, UNSPECIFIED BACK PAIN LATERALITY, UNSPECIFIED CHRONICITY: ICD-10-CM

## 2022-04-13 DIAGNOSIS — M62.830 BACK MUSCLE SPASM: ICD-10-CM

## 2022-04-13 PROCEDURE — 72148 MRI LUMBAR SPINE W/O DYE: CPT

## 2022-04-14 RX ORDER — METHOCARBAMOL 500 MG/1
500 TABLET, FILM COATED ORAL 4 TIMES DAILY PRN
Qty: 120 TABLET | Refills: 0 | Status: SHIPPED | OUTPATIENT
Start: 2022-04-14 | End: 2022-04-22 | Stop reason: SDUPTHER

## 2022-04-14 NOTE — TELEPHONE ENCOUNTER
Last OV 3/9/2022  Next OV 5/2/2022      Requested Prescriptions     Pending Prescriptions Disp Refills    methocarbamol (ROBAXIN) 500 MG tablet 120 tablet 0     Sig: Take 1 tablet by mouth 4 times daily as needed (muscle spasm)

## 2022-04-19 ENCOUNTER — TELEPHONE (OUTPATIENT)
Dept: FAMILY MEDICINE CLINIC | Age: 52
End: 2022-04-19

## 2022-04-22 DIAGNOSIS — M62.830 BACK MUSCLE SPASM: ICD-10-CM

## 2022-04-25 RX ORDER — METHOCARBAMOL 500 MG/1
500 TABLET, FILM COATED ORAL 4 TIMES DAILY PRN
Qty: 120 TABLET | Refills: 0 | Status: SHIPPED | OUTPATIENT
Start: 2022-04-25 | End: 2022-06-21 | Stop reason: SDUPTHER

## 2022-05-02 ENCOUNTER — OFFICE VISIT (OUTPATIENT)
Dept: FAMILY MEDICINE CLINIC | Age: 52
End: 2022-05-02
Payer: COMMERCIAL

## 2022-05-02 VITALS
BODY MASS INDEX: 39.6 KG/M2 | HEART RATE: 88 BPM | WEIGHT: 292.4 LBS | HEIGHT: 72 IN | TEMPERATURE: 97.9 F | SYSTOLIC BLOOD PRESSURE: 122 MMHG | DIASTOLIC BLOOD PRESSURE: 66 MMHG | RESPIRATION RATE: 16 BRPM | OXYGEN SATURATION: 96 %

## 2022-05-02 DIAGNOSIS — E78.5 HYPERLIPIDEMIA, UNSPECIFIED HYPERLIPIDEMIA TYPE: ICD-10-CM

## 2022-05-02 DIAGNOSIS — Z79.4 TYPE 2 DIABETES MELLITUS WITHOUT COMPLICATION, WITH LONG-TERM CURRENT USE OF INSULIN (HCC): Primary | ICD-10-CM

## 2022-05-02 DIAGNOSIS — G89.29 CHRONIC BACK PAIN, UNSPECIFIED BACK LOCATION, UNSPECIFIED BACK PAIN LATERALITY: ICD-10-CM

## 2022-05-02 DIAGNOSIS — E11.9 TYPE 2 DIABETES MELLITUS WITHOUT COMPLICATION, WITH LONG-TERM CURRENT USE OF INSULIN (HCC): Primary | ICD-10-CM

## 2022-05-02 DIAGNOSIS — J30.9 ALLERGIC RHINITIS, UNSPECIFIED SEASONALITY, UNSPECIFIED TRIGGER: ICD-10-CM

## 2022-05-02 DIAGNOSIS — M54.9 CHRONIC BACK PAIN, UNSPECIFIED BACK LOCATION, UNSPECIFIED BACK PAIN LATERALITY: ICD-10-CM

## 2022-05-02 DIAGNOSIS — I10 PRIMARY HYPERTENSION: ICD-10-CM

## 2022-05-02 LAB — HBA1C MFR BLD: 6.1 %

## 2022-05-02 PROCEDURE — 99214 OFFICE O/P EST MOD 30 MIN: CPT | Performed by: FAMILY MEDICINE

## 2022-05-02 PROCEDURE — 83036 HEMOGLOBIN GLYCOSYLATED A1C: CPT | Performed by: FAMILY MEDICINE

## 2022-05-02 PROCEDURE — 3044F HG A1C LEVEL LT 7.0%: CPT | Performed by: FAMILY MEDICINE

## 2022-05-02 RX ORDER — MONTELUKAST SODIUM 10 MG/1
10 TABLET ORAL DAILY
Qty: 30 TABLET | Refills: 3 | Status: SHIPPED | OUTPATIENT
Start: 2022-05-02

## 2022-05-02 RX ORDER — LEVOCETIRIZINE DIHYDROCHLORIDE 5 MG/1
5 TABLET, FILM COATED ORAL NIGHTLY
Qty: 30 TABLET | Refills: 2 | Status: SHIPPED | OUTPATIENT
Start: 2022-05-02

## 2022-05-02 RX ORDER — FLUTICASONE PROPIONATE 50 MCG
2 SPRAY, SUSPENSION (ML) NASAL DAILY
Qty: 16 G | Refills: 2 | Status: SHIPPED | OUTPATIENT
Start: 2022-05-02

## 2022-05-02 RX ORDER — PREGABALIN 75 MG/1
75 CAPSULE ORAL 2 TIMES DAILY
COMMUNITY
End: 2022-06-23 | Stop reason: SDUPTHER

## 2022-05-02 NOTE — PROGRESS NOTES
[facility] [address]  Dept: [phone]  Dept Fax: [phone]    Visit type: Established patient    Reason for Visit: Results (Lumbar MRI)         Assessment and Plan       1. Type 2 diabetes mellitus without complication, with long-term current use of insulin ([facility])  -     POCT glycosylated hemoglobin (Hb A1C)  2. Primary hypertension  3. Hyperlipidemia, unspecified hyperlipidemia type  4. Allergic rhinitis, unspecified seasonality, unspecified trigger  -     montelukast (SINGULAIR) 10 MG tablet; Take 1 tablet by mouth daily, Disp-30 tablet, R-3Normal  -     fluticasone (FLONASE) 50 MCG/ACT nasal spray; 2 sprays by Each Nostril route daily, Disp-16 g, R-2Normal  -     levocetirizine (XYZAL) 5 MG tablet; Take 1 tablet by mouth nightly, Disp-30 tablet, R-2Normal  5. Chronic back pain, unspecified back location, unspecified back pain laterality    With patient doing well with his current medications we will continue at this time continue to monitor and adjust as course dictates. Stressed importance of maintaining follow-up with his back specialist.  Discussed medications that may be beneficial for his allergy symptoms and lifestyle modifications that may beneficial., Stressed the importance of being diligent with his diabetic diet, discussed the importance of continued home monitoring of his blood pressure and discussed dietary and lifestyle modifications that may be beneficial.  Discussed signs symptoms requiring medical attention. All questions were answered and patient voiced understanding and agreement with plan as discussed. Return in about 6 months (around 11/2/2022), or if symptoms worsen or fail to improve.        Subjective       HPI   Patient reports that he ended up seeing Dr. Desean Sheldon for his back and based on his results was told that he would potentially benefit from surgery but could possibly pursue a trial of epidurals first to see if that would be beneficial for his symptoms. He does state that he changed him from Neurontin to Lyrica and that seems to be doing well for him and he denies any issues with the medication. Saw strenge for his back. And told him either surgery or trail of epiderals. Changed him from neurontin to lyrica and seems to be doing well. Patient has a history of type 2 diabetes and is doing well with his current medication. he denies any issues with the use of the medicine. he denies any new symptoms associated with his diabetes. he is attempting to be diligent with his diet and compliant with his medication. Patient has arterial hypertension that is doing well with his current medication. he denies any issues with use of his  medicine. he denies any symptoms associated with abnormal blood pressures. he is attempting to avoid excess salt intake. Patient has hyperlipidemia and reports that he is tolerating his Lipitor without any new myalgias or GI upsets. he is attempting to watch his diet and trying to stay physically active. He does state that he has been having more issues with his allergies. He denies any other associated symptoms and denies being sick just has been having difficulties getting everything cleared up. Review of Systems   Constitutional: Negative for activity change, appetite change, fatigue and fever. HENT: Positive for congestion and rhinorrhea. Negative for sore throat. Eyes: Negative for pain and discharge. Respiratory: Negative for cough, shortness of breath and wheezing. Cardiovascular: Negative for chest pain and palpitations. Gastrointestinal: Negative for abdominal pain, constipation, diarrhea, nausea and vomiting. Endocrine: Negative for cold intolerance and heat intolerance. Genitourinary: Negative for dysuria and hematuria. Musculoskeletal: Positive for back pain and myalgias. Negative for gait problem and neck pain. Skin: Negative for rash and wound. Neurological: Negative for syncope and weakness. Hematological: Negative for adenopathy. Does not bruise/bleed easily. Psychiatric/Behavioral: Negative for dysphoric mood and sleep disturbance. The patient is not nervous/anxious. No Known Allergies    Outpatient Medications Prior to Visit   Medication Sig Dispense Refill    pregabalin (LYRICA) 75 MG capsule Take 75 mg by mouth 2 times daily.  methocarbamol (ROBAXIN) 500 MG tablet Take 1 tablet by mouth 4 times daily as needed (muscle spasm) 120 tablet 0    lisinopril (PRINIVIL;ZESTRIL) 40 MG tablet Take 1 tablet by mouth daily 90 tablet 1    blood glucose monitor strips Test 3 times a day & as needed for symptoms of irregular blood glucose. Dispense sufficient amount for indicated testing frequency plus additional to accommodate PRN testing needs. 100 strip 2    Continuous Blood Gluc  (DEXCOM G6 ) YUNG 1 device continuous route does not apply. 1 each 0    insulin 70-30 (HUMULIN;NOVOLIN) (70-30) 100 UNIT per ML injection vial Inject 100 Units into the skin 2 times daily 6 each 5    Continuous Blood Gluc Transmit (DEXCOM G6 TRANSMITTER) MISC 1 each by Does not apply route continuous 3 each 3    Continuous Blood Gluc Sensor (DEXCOM G6 SENSOR) MISC Change sensor every 10 days 3 each 3    Semaglutide,0.25 or 0.5MG/DOS, (OZEMPIC, 0.25 OR 0.5 MG/DOSE,) 2 MG/1.5ML SOPN Inject 0.5 mg into the skin once a week 4 pen 3    atorvastatin (LIPITOR) 80 MG tablet Take 1 tablet by mouth daily 90 tablet 1    diclofenac (CATAFLAM) 50 MG tablet Take 1 tablet by mouth 2 times daily 180 tablet 1    gabapentin (NEURONTIN) 300 MG capsule Take 1 capsule by mouth 3 times daily for 30 days. 90 capsule 0     No facility-administered medications prior to visit.         Past Medical History:   Diagnosis Date    Anxiety     Depression     Neuropathy     Obstructive sleep apnea syndrome     Panic attacks     Primary hypertension     Type 2 diabetes mellitus without complication, with long-term current use of insulin ([facility])         Social History     Tobacco Use    Smoking status: Never Smoker    Smokeless tobacco: Never Used   Substance Use Topics    Alcohol use: Not Currently        Past Surgical History:   Procedure Laterality Date    COLONOSCOPY N/A 03/25/2022    Dr Karli Eli, AT (x5) W Dysplasia, TVA  (-)Dysplasia, int hemorrhoids Gr 2 wo bleeding, sub prep fair, 1 year recall       Family History   Problem Relation Age of Onset    Heart Disease Mother     Heart Disease Father     Diabetes Father        Objective       /66 (Site: Left Upper Arm, Position: Sitting, Cuff Size: Large Adult)   Pulse 88   Temp 97.9 °F (36.6 °C) (Infrared)   Resp 16   Ht 6' (1.829 m)   Wt 292 lb 6.4 oz (132.6 kg)   SpO2 96%   BMI 39.66 kg/m²   Physical Exam  Vitals and nursing note reviewed. Constitutional:       General: He is not in acute distress. Appearance: Normal appearance. He is well-developed. He is not diaphoretic. HENT:      Head: Normocephalic and atraumatic. Right Ear: External ear normal.      Left Ear: External ear normal.      Mouth/Throat:      Comments: Mask in place  Eyes:      General: No scleral icterus. Right eye: No discharge. Left eye: No discharge. Conjunctiva/sclera: Conjunctivae normal.   Neck:      Trachea: No tracheal deviation. Cardiovascular:      Rate and Rhythm: Normal rate and regular rhythm. Heart sounds: Normal heart sounds. No murmur heard. No friction rub. No gallop. Pulmonary:      Effort: Pulmonary effort is normal. No respiratory distress. Breath sounds: Normal breath sounds. No wheezing or rales. Abdominal:      General: Bowel sounds are normal. There is no distension. Palpations: Abdomen is soft. Tenderness: There is no abdominal tenderness.    Musculoskeletal:         General: No deformity (No gross deformities of upper or lower extremities) or signs of injury. Cervical back: Normal range of motion and neck supple. No muscular tenderness. Right lower leg: No edema. Left lower leg: No edema. Comments: Muscle spasms on the right lower back. No other abnormalities noted on exam.   Lymphadenopathy:      Cervical: No cervical adenopathy. Skin:     General: Skin is warm and dry. Findings: No erythema or rash. Neurological:      Mental Status: He is alert and oriented to person, place, and time. Cranial Nerves: No cranial nerve deficit. Psychiatric:         Behavior: Behavior normal.         Thought Content:  Thought content normal.           Data Reviewed and Summarized       Labs:     Imaging/Testing:        Rosezetta Cowden, MD

## 2022-05-18 ENCOUNTER — OFFICE VISIT (OUTPATIENT)
Dept: FAMILY MEDICINE CLINIC | Age: 52
End: 2022-05-18
Payer: COMMERCIAL

## 2022-05-18 VITALS
BODY MASS INDEX: 38.74 KG/M2 | TEMPERATURE: 98.2 F | HEIGHT: 72 IN | DIASTOLIC BLOOD PRESSURE: 78 MMHG | OXYGEN SATURATION: 98 % | HEART RATE: 96 BPM | SYSTOLIC BLOOD PRESSURE: 136 MMHG | WEIGHT: 286 LBS
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
## 2022-05-18 DIAGNOSIS — S80.861A TICK BITE OF RIGHT LOWER LEG, INITIAL ENCOUNTER: ICD-10-CM
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
## 2022-05-18 DIAGNOSIS — S80.861A TICK BITE OF RIGHT LOWER LEG, INITIAL ENCOUNTER: Primary | ICD-10-CM

## 2022-05-18 LAB
C-REACTIVE PROTEIN: 0.34 MG/DL (ref 0–0.5)
HCT VFR BLD CALC: 47.7 % (ref 42–52)
HEMOGLOBIN: 15.4 G/DL (ref 14–18)
MCH RBC QN AUTO: 29.6 PG (ref 27–31)
MCHC RBC AUTO-ENTMCNC: 32.3 G/DL (ref 33–37)
MCV RBC AUTO: 91.6 FL (ref 80–94)
PDW BLD-RTO: 14.4 % (ref 11.5–14.5)
PLATELET # BLD: 189 K/UL (ref 130–400)
PMV BLD AUTO: 12 FL (ref 9.4–12.4)
RBC # BLD: 5.21 M/UL (ref 4.7–6.1)
WBC # BLD: 9.6 K/UL (ref 4.8–10.8)

## 2022-05-18 PROCEDURE — 99213 OFFICE O/P EST LOW 20 MIN: CPT | Performed by: FAMILY MEDICINE

## 2022-05-18 RX ORDER — DOXYCYCLINE HYCLATE 100 MG
100 TABLET ORAL 2 TIMES DAILY
Qty: 20 TABLET | Refills: 0 | Status: SHIPPED | OUTPATIENT
Start: 2022-05-18 | End: 2022-05-28

## 2022-05-18 ASSESSMENT — ENCOUNTER SYMPTOMS
RESPIRATORY NEGATIVE: 1
GASTROINTESTINAL NEGATIVE: 1
ALLERGIC/IMMUNOLOGIC NEGATIVE: 1
EYES NEGATIVE: 1

## 2022-05-18 NOTE — PROGRESS NOTES
SUBJECTIVE:    Patient ID: Liliane Santoro is a 46 y.o.male. HPI:   Patient here for follow-up of multiple medical problems  Patient is 57-year-old white male. He was bitten by a tick 3 to 4 days ago. Right leg have a bull's-eye looking lesion. Denies any fevers or chills. No nausea no vomiting. Past Medical History:   Diagnosis Date    Anxiety     Depression     Neuropathy     Obstructive sleep apnea syndrome     Panic attacks     Primary hypertension     Type 2 diabetes mellitus without complication, with long-term current use of insulin ([facility])       Current Outpatient Medications   Medication Sig Dispense Refill    doxycycline hyclate (VIBRA-TABS) 100 MG tablet Take 1 tablet by mouth 2 times daily for 10 days 20 tablet 0    pregabalin (LYRICA) 75 MG capsule Take 75 mg by mouth 2 times daily.  montelukast (SINGULAIR) 10 MG tablet Take 1 tablet by mouth daily 30 tablet 3    fluticasone (FLONASE) 50 MCG/ACT nasal spray 2 sprays by Each Nostril route daily 16 g 2    levocetirizine (XYZAL) 5 MG tablet Take 1 tablet by mouth nightly 30 tablet 2    methocarbamol (ROBAXIN) 500 MG tablet Take 1 tablet by mouth 4 times daily as needed (muscle spasm) 120 tablet 0    lisinopril (PRINIVIL;ZESTRIL) 40 MG tablet Take 1 tablet by mouth daily 90 tablet 1    blood glucose monitor strips Test 3 times a day & as needed for symptoms of irregular blood glucose. Dispense sufficient amount for indicated testing frequency plus additional to accommodate PRN testing needs. 100 strip 2    Continuous Blood Gluc  (DEXCOM G6 ) YUNG 1 device continuous route does not apply.  1 each 0    insulin 70-30 (HUMULIN;NOVOLIN) (70-30) 100 UNIT per ML injection vial Inject 100 Units into the skin 2 times daily 6 each 5    Continuous Blood Gluc Transmit (DEXCOM G6 TRANSMITTER) MISC 1 each by Does not apply route continuous 3 each 3    Continuous Blood Gluc Sensor (DEXCOM G6 SENSOR) MISC Change sensor every 10 days 3 each 3    Semaglutide,0.25 or 0.5MG/DOS, (OZEMPIC, 0.25 OR 0.5 MG/DOSE,) 2 MG/1.5ML SOPN Inject 0.5 mg into the skin once a week 4 pen 3    atorvastatin (LIPITOR) 80 MG tablet Take 1 tablet by mouth daily 90 tablet 1    diclofenac (CATAFLAM) 50 MG tablet Take 1 tablet by mouth 2 times daily 180 tablet 1     No current facility-administered medications for this visit. No Known Allergies    Review of Systems   Constitutional: Negative. HENT: Negative. Eyes: Negative. Respiratory: Negative. Cardiovascular: Negative. Gastrointestinal: Negative. Endocrine: Negative. Genitourinary: Negative. Musculoskeletal: Negative. Skin: Negative. Allergic/Immunologic: Negative. Neurological: Negative. Hematological: Negative. Psychiatric/Behavioral: Negative. OBJECTIVE:    Physical Exam  Vitals reviewed. Constitutional:       Appearance: Normal appearance. He is well-developed. HENT:      Head: Normocephalic and atraumatic. Right Ear: Tympanic membrane, ear canal and external ear normal. There is no impacted cerumen. Left Ear: Tympanic membrane, ear canal and external ear normal. There is no impacted cerumen. Nose: Nose normal.      Mouth/Throat:      Lips: Pink. Mouth: Mucous membranes are moist.      Dentition: Normal dentition. Tongue: No lesions. Pharynx: Oropharynx is clear. Uvula midline. Tonsils: No tonsillar exudate or tonsillar abscesses. Eyes:      General: Lids are normal.         Right eye: No discharge. Left eye: No discharge. Extraocular Movements:      Right eye: Normal extraocular motion. Left eye: Normal extraocular motion. Conjunctiva/sclera: Conjunctivae normal.      Right eye: Right conjunctiva is not injected. Left eye: Left conjunctiva is not injected. Pupils: Pupils are equal, round, and reactive to light. Neck:      Thyroid: No thyromegaly.       Vascular: No carotid bruit or JVD.   Cardiovascular:      Rate and Rhythm: Normal rate and regular rhythm. Pulses:           Carotid pulses are 2+ on the right side and 2+ on the left side. Radial pulses are 2+ on the right side and 2+ on the left side. Heart sounds: Normal heart sounds, S1 normal and S2 normal. No murmur heard. Pulmonary:      Effort: Pulmonary effort is normal. No accessory muscle usage. Breath sounds: Normal breath sounds. Abdominal:      General: Bowel sounds are normal. There is no distension or abdominal bruit. Palpations: Abdomen is soft. There is no mass. Tenderness: There is no abdominal tenderness. Hernia: No hernia is present. Musculoskeletal:         General: Normal range of motion. Cervical back: Normal range of motion and neck supple. Right lower leg: No edema. Left lower leg: No edema. Lymphadenopathy:      Cervical:      Right cervical: No superficial cervical adenopathy. Left cervical: No superficial cervical adenopathy. Skin:     General: Skin is warm and dry. Coloration: Skin is not jaundiced or pale. Findings: No lesion or rash. Nails: There is no clubbing. Neurological:      Mental Status: He is alert and oriented to person, place, and time. Cranial Nerves: No facial asymmetry. Motor: No weakness or tremor. Coordination: Coordination normal.      Gait: Gait normal.      Deep Tendon Reflexes: Reflexes are normal and symmetric. Psychiatric:         Attention and Perception: Attention normal.         Mood and Affect: Mood normal.         Speech: Speech normal.         Behavior: Behavior normal.         Thought Content:  Thought content normal.         Cognition and Memory: Memory normal.         Judgment: Judgment normal.        /78 (Site: Left Upper Arm, Position: Sitting, Cuff Size: Medium Adult)   Pulse 96   Temp 98.2 °F (36.8 °C) (Temporal)   Ht 6' (1.829 m)   Wt 286 lb (129.7 kg)   SpO2 98% BMI 38.79 kg/m²      ASSESSMENT:     Diagnosis Orders   1. Tick bite of right lower leg, initial encounter with inflammatory reaction versus Lyme disease doxycycline hyclate (VIBRA-TABS) 100 MG tablet    CBC    Lyme disease, western blot    C-Reactive Protein        PLAN:    1. Doxycycline. Blood work.   Follow-up 1 week

## 2022-05-21 LAB
LYME (B. BURGDORFERI) AB IGG WB: NEGATIVE
LYME AB IGM BY WB:: NEGATIVE

## 2022-05-21 ASSESSMENT — ENCOUNTER SYMPTOMS
SORE THROAT: 0
ABDOMINAL PAIN: 0
VOMITING: 0
CONSTIPATION: 0
EYE DISCHARGE: 0
EYE PAIN: 0
DIARRHEA: 0
BACK PAIN: 1
NAUSEA: 0
WHEEZING: 0
COUGH: 0
RHINORRHEA: 1
SHORTNESS OF BREATH: 0

## 2022-05-22 NOTE — PATIENT INSTRUCTIONS
Patient Education        Managing Your Allergies: Care Instructions  Your Care Instructions     Managing your allergies is an important part of staying healthy. Your doctor will help you find out what may be the cause of the allergies. Common causes ofsymptoms are house dust and dust mites, animal dander, mold, and pollen. As soon as you know what triggers your symptoms, try to avoid those things. This can help prevent allergy symptoms, asthma, and other health problems. Ask your doctor about allergy medicine or immunotherapy. These treatments mayhelp reduce or prevent allergy symptoms. Follow-up care is a key part of your treatment and safety. Be sure to make and go to all appointments, and call your doctor if you are having problems. It's also a good idea to know your test results and keep alist of the medicines you take. How can you care for yourself at home?  If you have been told by your doctor that dust or dust mites are causing your allergy, decrease the dust around your bed:  ? Wash sheets, pillowcases, and other bedding in hot water every week. ? Use dust-proof covers for pillows, duvets, and mattresses. Avoid plastic covers because they tear easily and do not \"breathe. \" Wash as instructed on the label. ? Do not use any blankets and pillows that you do not need. ? Use blankets that you can wash in your washing machine. ? Consider removing drapes and carpets, which attract and hold dust, from your bedroom.  If you are allergic to house dust and mites, do not use home humidifiers. Your doctor can suggest ways you can control dust and mites.  Look for signs of cockroaches. Cockroaches cause allergic reactions. Use cockroach baits to get rid of them. Then, clean your home well. Cockroaches like areas where grocery bags, newspapers, empty bottles, or cardboard boxes are stored. Do not keep these inside your home, and keep trash and food containers sealed.  Seal off any spots where cockroaches might enter your home.  If you are allergic to mold, get rid of furniture, rugs, and drapes that smell musty. Check for mold in the bathroom.  If you are allergic to outdoor pollen or mold spores, use air-conditioning. Change or clean all filters every month. Keep windows closed.  If you are allergic to pollen, stay inside when pollen counts are high. Use a vacuum  with a HEPA filter or a double-thickness filter at least two times each week.  Stay inside when air pollution is bad. Avoid paint fumes, perfumes, and other strong odors.  Avoid conditions that make your allergies worse. Stay away from smoke. Do not smoke or let anyone else smoke in your house. Do not use fireplaces or wood-burning stoves.  If you are allergic to your pets, change the air filter in your furnace every month. Use high-efficiency filters.  If you are allergic to pet dander, keep pets outside or out of your bedroom. Old carpet and cloth furniture can hold a lot of animal dander. You may need to replace them. When should you call for help? Give an epinephrine shot if:     You think you are having a severe allergic reaction. After giving an epinephrine shot call 911, even if you feel better. Call 911 if:     You have symptoms of a severe allergic reaction. These may include:  ? Sudden raised, red areas (hives) all over your body. ? Swelling of the throat, mouth, lips, or tongue. ? Trouble breathing. ? Passing out (losing consciousness). Or you may feel very lightheaded or suddenly feel weak, confused, or restless.      You have been given an epinephrine shot, even if you feel better. Call your doctor now or seek immediate medical care if:     You have symptoms of an allergic reaction, such as:  ? A rash or hives (raised, red areas on the skin). ? Itching. ? Swelling. ? Belly pain, nausea, or vomiting.    Watch closely for changes in your health, and be sure to contact your doctor if:     Your allergies get worse.      You need help controlling your allergies.      You have questions about allergy testing.      You do not get better as expected. Where can you learn more? Go to [email or web address] and sign in to your [employer] account. Enter L249 in the [employer] box to learn more about \"Managing Your Allergies: Care Instructions. \"     If you do not have an account, please click on the \"Sign Up Now\" link. Current as of: February 10, 2021               Content Version: 13.2  © 6437-5111 Healthwise, Incorporated. Care instructions adapted under license by [facility] ([facility]). If you have questions about a medical condition or this instruction, always ask your healthcare professional. [address] any warranty or liability for your use of this information.

## 2022-06-16 ENCOUNTER — TELEPHONE (OUTPATIENT)
Dept: FAMILY MEDICINE CLINIC | Age: 52
End: 2022-06-16

## 2022-06-16 NOTE — TELEPHONE ENCOUNTER
----- Message from Zample sent at 6/15/2022  3:13 PM CDT -----  Subject: Refill Request    QUESTIONS  Name of Medication? pregabalin (LYRICA) 75 MG capsule  Patient-reported dosage and instructions? 2 daily  How many days do you have left? 4  Preferred Pharmacy? [facility] phone number (if available)? [phone]  ---------------------------------------------------------------------------  --------------  CALL BACK INFO  What is the best way for the office to contact you? OK to leave message on   voicemail  Preferred Call Back Phone Number? [phone]  ---------------------------------------------------------------------------  --------------  SCRIPT ANSWERS  Relationship to Patient?  Self

## 2022-06-21 DIAGNOSIS — M62.830 BACK MUSCLE SPASM: ICD-10-CM

## 2022-06-21 RX ORDER — METHOCARBAMOL 500 MG/1
500 TABLET, FILM COATED ORAL 4 TIMES DAILY PRN
Qty: 120 TABLET | Refills: 0 | Status: SHIPPED | OUTPATIENT
Start: 2022-06-21 | End: 2022-09-22

## 2022-06-23 ENCOUNTER — OFFICE VISIT (OUTPATIENT)
Dept: FAMILY MEDICINE CLINIC | Age: 52
End: 2022-06-23
Payer: COMMERCIAL

## 2022-06-23 VITALS
DIASTOLIC BLOOD PRESSURE: 80 MMHG | OXYGEN SATURATION: 96 % | HEIGHT: 72 IN | SYSTOLIC BLOOD PRESSURE: 132 MMHG | HEART RATE: 88 BPM | WEIGHT: 286 LBS | BODY MASS INDEX: 38.74 KG/M2 | TEMPERATURE: 97.9 F

## 2022-06-23 DIAGNOSIS — I10 PRIMARY HYPERTENSION: ICD-10-CM

## 2022-06-23 DIAGNOSIS — N52.9 ERECTILE DYSFUNCTION, UNSPECIFIED ERECTILE DYSFUNCTION TYPE: ICD-10-CM

## 2022-06-23 DIAGNOSIS — M79.2 NEURALGIA: Primary | ICD-10-CM

## 2022-06-23 PROCEDURE — 99214 OFFICE O/P EST MOD 30 MIN: CPT | Performed by: FAMILY MEDICINE

## 2022-06-23 RX ORDER — SILDENAFIL 100 MG/1
100 TABLET, FILM COATED ORAL PRN
Qty: 30 TABLET | Refills: 2 | Status: SHIPPED | OUTPATIENT
Start: 2022-06-23

## 2022-06-23 RX ORDER — PREGABALIN 75 MG/1
75 CAPSULE ORAL 2 TIMES DAILY
Qty: 60 CAPSULE | Refills: 2 | Status: SHIPPED | OUTPATIENT
Start: 2022-06-23 | End: 2022-07-23

## 2022-06-23 NOTE — PROGRESS NOTES
Lady PAZ [address]  Dept: [phone]  Dept Fax: [phone]    Visit type: Established patient    Reason for Visit: Medication Refill         Assessment and Plan       1. Neuralgia  -     pregabalin (LYRICA) 75 MG capsule; Take 1 capsule by mouth 2 times daily for 30 days. , Disp-60 capsule, R-2Normal  2. Primary hypertension  3. Erectile dysfunction, unspecified erectile dysfunction type  -     sildenafil (VIAGRA) 100 MG tablet; Take 1 tablet by mouth as needed for Erectile Dysfunction, Disp-30 tablet, R-2Normal    With patient doing well with his current medications we will continue at this time continue to monitor and adjust as course dictates. Stressed the importance of being diligent with his diabetic diet, discussed the importance of continued home monitoring of his blood pressure and discussed dietary and lifestyle modifications that may be beneficial.  Discussed signs symptoms requiring medical attention. All questions were answered and patient voiced understanding and agreement with plan as discussed. Return in about 3 months (around 9/23/2022), or if symptoms worsen or fail to improve. Subjective       HPI   Has some neuralgia symptoms. He was started on neurontin but didn't really do well for him. Dr. Majo Martin got him started on lyrica and that did well for him. He states that he is doing well with medicine and has improvement of his pain. He does still having the numbness of his right thigh but is not having the pain since he has been on the lyrica and denies any issues with the medicine or any new injuries or recent changes to his symptoms. He does have issues with muscle spasms and benefits from the use of Robaxin. Denies any problems from medicine or recent changes to his symptoms. He does have arterial hypertension and continues to do well with his current medications. Denies any issues with use of his medicine.   Denies any symptoms of abnormal blood pressures. He does attempt avoid excess salt intake. He does also have issues with erectile dysfunction and continues to benefit from the use of Viagra. He denies any problems with medicine or any recent changes to his symptoms. Review of Systems   Constitutional: Negative for activity change, appetite change, fatigue and fever. HENT: Negative for congestion, rhinorrhea and sore throat. Eyes: Negative for pain and discharge. Respiratory: Negative for cough, shortness of breath and wheezing. Cardiovascular: Negative for chest pain and palpitations. Gastrointestinal: Negative for abdominal pain, constipation, diarrhea, nausea and vomiting. Endocrine: Negative for cold intolerance and heat intolerance. Genitourinary: Negative for dysuria and hematuria. Musculoskeletal: Positive for back pain and myalgias. Negative for gait problem and neck pain. Skin: Negative for rash and wound. Neurological: Negative for syncope and weakness. Hematological: Negative for adenopathy. Does not bruise/bleed easily. Psychiatric/Behavioral: Negative for dysphoric mood and sleep disturbance. The patient is not nervous/anxious. No Known Allergies    Outpatient Medications Prior to Visit   Medication Sig Dispense Refill    methocarbamol (ROBAXIN) 500 MG tablet Take 1 tablet by mouth 4 times daily as needed (muscle spasm) 120 tablet 0    montelukast (SINGULAIR) 10 MG tablet Take 1 tablet by mouth daily 30 tablet 3    fluticasone (FLONASE) 50 MCG/ACT nasal spray 2 sprays by Each Nostril route daily 16 g 2    levocetirizine (XYZAL) 5 MG tablet Take 1 tablet by mouth nightly 30 tablet 2    lisinopril (PRINIVIL;ZESTRIL) 40 MG tablet Take 1 tablet by mouth daily 90 tablet 1    blood glucose monitor strips Test 3 times a day & as needed for symptoms of irregular blood glucose. Dispense sufficient amount for indicated testing frequency plus additional to accommodate PRN testing needs. 100 strip 2    Continuous Blood Gluc  (DEXCOM G6 ) YUNG 1 device continuous route does not apply. 1 each 0    insulin 70-30 (HUMULIN;NOVOLIN) (70-30) 100 UNIT per ML injection vial Inject 100 Units into the skin 2 times daily 6 each 5    Continuous Blood Gluc Transmit (DEXCOM G6 TRANSMITTER) MISC 1 each by Does not apply route continuous 3 each 3    Continuous Blood Gluc Sensor (DEXCOM G6 SENSOR) MISC Change sensor every 10 days 3 each 3    Semaglutide,0.25 or 0.5MG/DOS, (OZEMPIC, 0.25 OR 0.5 MG/DOSE,) 2 MG/1.5ML SOPN Inject 0.5 mg into the skin once a week 4 pen 3    atorvastatin (LIPITOR) 80 MG tablet Take 1 tablet by mouth daily 90 tablet 1    diclofenac (CATAFLAM) 50 MG tablet Take 1 tablet by mouth 2 times daily 180 tablet 1    pregabalin (LYRICA) 75 MG capsule Take 75 mg by mouth 2 times daily. No facility-administered medications prior to visit. Past Medical History:   Diagnosis Date    Anxiety     Depression     Neuropathy     Obstructive sleep apnea syndrome     Panic attacks     Primary hypertension     Type 2 diabetes mellitus without complication, with long-term current use of insulin ([facility])         Social History     Tobacco Use    Smoking status: Never Smoker    Smokeless tobacco: Never Used   Substance Use Topics    Alcohol use: Not Currently        Past Surgical History:   Procedure Laterality Date    COLONOSCOPY N/A 03/25/2022    Dr Melody Carney, AT (x5) W Dysplasia, TVA  (-)Dysplasia, int hemorrhoids Gr 2 wo bleeding, sub prep fair, 1 year recall       Family History   Problem Relation Age of Onset    Heart Disease Mother     Heart Disease Father     Diabetes Father        Objective       /80 (Site: Right Upper Arm, Position: Sitting, Cuff Size: Large Adult)   Pulse 88   Temp 97.9 °F (36.6 °C) (Temporal)   Ht 6' (1.829 m)   Wt 286 lb (129.7 kg)   SpO2 96%   BMI 38.79 kg/m²   Physical Exam  Vitals and nursing note reviewed.

## 2022-07-09 ASSESSMENT — ENCOUNTER SYMPTOMS
SORE THROAT: 0
COUGH: 0
EYE DISCHARGE: 0
SHORTNESS OF BREATH: 0
NAUSEA: 0
WHEEZING: 0
BACK PAIN: 1
VOMITING: 0
RHINORRHEA: 0
ABDOMINAL PAIN: 0
EYE PAIN: 0
DIARRHEA: 0
CONSTIPATION: 0

## 2022-07-09 NOTE — PATIENT INSTRUCTIONS
Patient Education        Home Blood Pressure Test: About This Test  What is it? A home blood pressure test allows you to keep track of your blood pressure at home. Blood pressure is a measure of the force of blood against the walls of your arteries. Blood pressure readings include two numbers, such as 130/80 (say \"130 over 80\"). The first number is the systolic pressure. The second number isthe diastolic pressure. Why is this test done? You may do this test at home to:   Find out if you have high blood pressure.  Track your blood pressure if you have high blood pressure.  Track how well medicine is working to reduce high blood pressure.  Check how lifestyle changes, such as weight loss and exercise, are affecting blood pressure. How do you prepare for the test?  For at least 30 minutes before you take your blood pressure, don't exercise, drink caffeine, or smoke. Empty your bladder before the test. Sit quietly with your back straight and both feet on the floor for at least 5 minutes. Thishelps you take your blood pressure while you feel comfortable and relaxed. How is the test done?  If your doctor recommends it, take your blood pressure twice a day. Take it in the morning and evening.  Sit with your arm slightly bent and resting on a table so that your upper arm is at the same level as your heart.  Use the same arm each time you take your blood pressure.  Place the blood pressure cuff on the bare skin of your upper arm. You may have to roll up your sleeve, remove your arm from the sleeve, or take your shirt off.  Wrap the blood pressure cuff around your upper arm so that the lower edge of the cuff is about 1 inch above the bend of your elbow.  Do not move, talk, or text while you take your blood pressure. Follow the instructions that came with your blood pressure monitor. They mightbe different from the following.  Press the on/off button on the automatic monitor.  Then you may need to wait until the screen says the monitor is ready.  Press the start button. The cuff will inflate and deflate by itself.  Your blood pressure numbers will appear on the screen.  Wait one minute and take your blood pressure again.  If your monitor does not automatically save your numbers, write them in your log book, along with the date and time. Follow-up care is a key part of your treatment and safety. Be sure to make and go to all appointments, and call your doctor if you arehaving problems. It's also a good idea to keep a list of the medicines you take. Where can you learn more? Go to https://Wanshenpepiceweb.[employer]. org and sign in to your [employer] account. Enter C427 in the [employer] box to learn more about \"Home Blood Pressure Test: About This Test.\"     If you do not have an account, please click on the \"Sign Up Now\" link. Current as of: January 10, 2022               Content Version: 13.3  © 2006-2022 Healthwise, Incorporated. Care instructions adapted under license by [facility] ([facility]). If you have questions about a medical condition or this instruction, always ask your healthcare professional. [place] [address] any warranty or liability for your use of this information.

## 2022-08-07 DIAGNOSIS — E78.5 HYPERLIPIDEMIA, UNSPECIFIED HYPERLIPIDEMIA TYPE: ICD-10-CM

## 2022-08-08 RX ORDER — ATORVASTATIN CALCIUM 80 MG/1
TABLET, FILM COATED ORAL
Qty: 30 TABLET | Refills: 2 | Status: SHIPPED | OUTPATIENT
Start: 2022-08-08

## 2022-09-06 DIAGNOSIS — I10 PRIMARY HYPERTENSION: ICD-10-CM

## 2022-09-06 RX ORDER — LISINOPRIL 40 MG/1
TABLET ORAL
Qty: 30 TABLET | Refills: 0 | Status: SHIPPED | OUTPATIENT
Start: 2022-09-06 | End: 2022-10-10

## 2022-09-22 DIAGNOSIS — M62.830 BACK MUSCLE SPASM: ICD-10-CM

## 2022-09-22 RX ORDER — METHOCARBAMOL 500 MG/1
TABLET, FILM COATED ORAL
Qty: 120 TABLET | Refills: 0 | Status: SHIPPED | OUTPATIENT
Start: 2022-09-22 | End: 2022-10-24

## 2022-10-10 DIAGNOSIS — I10 PRIMARY HYPERTENSION: ICD-10-CM

## 2022-10-10 RX ORDER — LISINOPRIL 40 MG/1
TABLET ORAL
Qty: 30 TABLET | Refills: 2 | Status: SHIPPED | OUTPATIENT
Start: 2022-10-10

## 2022-10-23 DIAGNOSIS — M79.2 NEURALGIA: ICD-10-CM

## 2022-10-23 DIAGNOSIS — M62.830 BACK MUSCLE SPASM: ICD-10-CM

## 2022-10-24 RX ORDER — METHOCARBAMOL 500 MG/1
TABLET, FILM COATED ORAL
Qty: 120 TABLET | Refills: 0 | Status: SHIPPED | OUTPATIENT
Start: 2022-10-24

## 2022-10-24 RX ORDER — PREGABALIN 75 MG/1
CAPSULE ORAL
Qty: 60 CAPSULE | Refills: 0 | OUTPATIENT
Start: 2022-10-24

## 2022-10-27 DIAGNOSIS — M79.2 NEURALGIA: ICD-10-CM

## 2022-10-27 RX ORDER — PREGABALIN 75 MG/1
CAPSULE ORAL
Qty: 60 CAPSULE | Refills: 0 | OUTPATIENT
Start: 2022-10-27

## 2022-11-09 DIAGNOSIS — E78.5 HYPERLIPIDEMIA, UNSPECIFIED HYPERLIPIDEMIA TYPE: ICD-10-CM

## 2022-11-09 RX ORDER — ATORVASTATIN CALCIUM 80 MG/1
TABLET, FILM COATED ORAL
Qty: 30 TABLET | Refills: 0 | Status: SHIPPED | OUTPATIENT
Start: 2022-11-09

## 2022-12-05 DIAGNOSIS — E78.5 HYPERLIPIDEMIA, UNSPECIFIED HYPERLIPIDEMIA TYPE: ICD-10-CM

## 2022-12-05 RX ORDER — ATORVASTATIN CALCIUM 80 MG/1
TABLET, FILM COATED ORAL
Qty: 30 TABLET | Refills: 0 | Status: SHIPPED | OUTPATIENT
Start: 2022-12-05

## 2022-12-24 DIAGNOSIS — M62.830 BACK MUSCLE SPASM: ICD-10-CM

## 2022-12-27 RX ORDER — METHOCARBAMOL 500 MG/1
TABLET, FILM COATED ORAL
Qty: 120 TABLET | Refills: 0 | Status: SHIPPED | OUTPATIENT
Start: 2022-12-27

## 2022-12-31 DIAGNOSIS — E11.9 TYPE 2 DIABETES MELLITUS WITHOUT COMPLICATION, WITH LONG-TERM CURRENT USE OF INSULIN (HCC): ICD-10-CM

## 2022-12-31 DIAGNOSIS — M79.2 NEURALGIA: ICD-10-CM

## 2022-12-31 DIAGNOSIS — Z79.4 TYPE 2 DIABETES MELLITUS WITHOUT COMPLICATION, WITH LONG-TERM CURRENT USE OF INSULIN (HCC): ICD-10-CM

## 2023-01-03 RX ORDER — SEMAGLUTIDE 1.34 MG/ML
0.5 INJECTION, SOLUTION SUBCUTANEOUS WEEKLY
Qty: 4 ADJUSTABLE DOSE PRE-FILLED PEN SYRINGE | Refills: 1 | Status: SHIPPED | OUTPATIENT
Start: 2023-01-03

## 2023-01-03 RX ORDER — PREGABALIN 75 MG/1
75 CAPSULE ORAL 2 TIMES DAILY
Qty: 60 CAPSULE | Refills: 2 | OUTPATIENT
Start: 2023-01-03 | End: 2023-02-02

## 2023-01-04 DIAGNOSIS — I10 PRIMARY HYPERTENSION: ICD-10-CM

## 2023-01-04 DIAGNOSIS — E78.5 HYPERLIPIDEMIA, UNSPECIFIED HYPERLIPIDEMIA TYPE: ICD-10-CM

## 2023-01-04 RX ORDER — LISINOPRIL 40 MG/1
TABLET ORAL
Qty: 30 TABLET | Refills: 0 | Status: SHIPPED | OUTPATIENT
Start: 2023-01-04

## 2023-01-04 RX ORDER — ATORVASTATIN CALCIUM 80 MG/1
TABLET, FILM COATED ORAL
Qty: 30 TABLET | Refills: 0 | Status: SHIPPED | OUTPATIENT
Start: 2023-01-04

## 2023-01-09 ENCOUNTER — OFFICE VISIT (OUTPATIENT)
Dept: FAMILY MEDICINE CLINIC | Age: 53
End: 2023-01-09
Payer: COMMERCIAL

## 2023-01-09 VITALS
HEART RATE: 84 BPM | BODY MASS INDEX: 38.87 KG/M2 | HEIGHT: 72 IN | TEMPERATURE: 98.4 F | OXYGEN SATURATION: 97 % | SYSTOLIC BLOOD PRESSURE: 132 MMHG | DIASTOLIC BLOOD PRESSURE: 76 MMHG | WEIGHT: 287 LBS

## 2023-01-09 DIAGNOSIS — E78.5 HYPERLIPIDEMIA, UNSPECIFIED HYPERLIPIDEMIA TYPE: ICD-10-CM

## 2023-01-09 DIAGNOSIS — Z79.4 TYPE 2 DIABETES MELLITUS WITHOUT COMPLICATION, WITH LONG-TERM CURRENT USE OF INSULIN (HCC): ICD-10-CM

## 2023-01-09 DIAGNOSIS — I10 PRIMARY HYPERTENSION: ICD-10-CM

## 2023-01-09 DIAGNOSIS — E11.9 TYPE 2 DIABETES MELLITUS WITHOUT COMPLICATION, WITH LONG-TERM CURRENT USE OF INSULIN (HCC): ICD-10-CM

## 2023-01-09 DIAGNOSIS — M79.2 NEURALGIA: Primary | ICD-10-CM

## 2023-01-09 LAB
ALBUMIN SERPL-MCNC: 3.9 G/DL (ref 3.5–5.2)
ALP BLD-CCNC: 87 U/L (ref 40–130)
ALT SERPL-CCNC: 12 U/L (ref 5–41)
ANION GAP SERPL CALCULATED.3IONS-SCNC: 13 MMOL/L (ref 7–19)
AST SERPL-CCNC: 14 U/L (ref 5–40)
BASOPHILS ABSOLUTE: 0.1 K/UL (ref 0–0.2)
BASOPHILS RELATIVE PERCENT: 0.4 % (ref 0–1)
BILIRUB SERPL-MCNC: 1.2 MG/DL (ref 0.2–1.2)
BUN BLDV-MCNC: 15 MG/DL (ref 6–20)
CALCIUM SERPL-MCNC: 8.7 MG/DL (ref 8.6–10)
CHLORIDE BLD-SCNC: 100 MMOL/L (ref 98–111)
CHOLESTEROL, TOTAL: 112 MG/DL (ref 160–199)
CO2: 27 MMOL/L (ref 22–29)
CREAT SERPL-MCNC: 1.1 MG/DL (ref 0.5–1.2)
EOSINOPHILS ABSOLUTE: 0.2 K/UL (ref 0–0.6)
EOSINOPHILS RELATIVE PERCENT: 1.9 % (ref 0–5)
GFR SERPL CREATININE-BSD FRML MDRD: >60 ML/MIN/{1.73_M2}
GLUCOSE BLD-MCNC: 87 MG/DL (ref 74–109)
HBA1C MFR BLD: 5.8 % (ref 4–6)
HCT VFR BLD CALC: 50.4 % (ref 42–52)
HDLC SERPL-MCNC: 38 MG/DL (ref 55–121)
HEMOGLOBIN: 16.1 G/DL (ref 14–18)
IMMATURE GRANULOCYTES #: 0.1 K/UL
LDL CHOLESTEROL CALCULATED: 57 MG/DL
LYMPHOCYTES ABSOLUTE: 1.7 K/UL (ref 1.1–4.5)
LYMPHOCYTES RELATIVE PERCENT: 15.2 % (ref 20–40)
MCH RBC QN AUTO: 29.2 PG (ref 27–31)
MCHC RBC AUTO-ENTMCNC: 31.9 G/DL (ref 33–37)
MCV RBC AUTO: 91.3 FL (ref 80–94)
MONOCYTES ABSOLUTE: 0.8 K/UL (ref 0–0.9)
MONOCYTES RELATIVE PERCENT: 7.1 % (ref 0–10)
NEUTROPHILS ABSOLUTE: 8.5 K/UL (ref 1.5–7.5)
NEUTROPHILS RELATIVE PERCENT: 75 % (ref 50–65)
PDW BLD-RTO: 14.1 % (ref 11.5–14.5)
PLATELET # BLD: 209 K/UL (ref 130–400)
PMV BLD AUTO: 11.8 FL (ref 9.4–12.4)
POTASSIUM SERPL-SCNC: 3.8 MMOL/L (ref 3.5–5)
RBC # BLD: 5.52 M/UL (ref 4.7–6.1)
SODIUM BLD-SCNC: 140 MMOL/L (ref 136–145)
TOTAL PROTEIN: 7.1 G/DL (ref 6.6–8.7)
TRIGL SERPL-MCNC: 86 MG/DL (ref 0–149)
TSH SERPL DL<=0.05 MIU/L-ACNC: 2.27 UIU/ML (ref 0.27–4.2)
WBC # BLD: 11.3 K/UL (ref 4.8–10.8)

## 2023-01-09 PROCEDURE — 3044F HG A1C LEVEL LT 7.0%: CPT | Performed by: FAMILY MEDICINE

## 2023-01-09 PROCEDURE — 3074F SYST BP LT 130 MM HG: CPT | Performed by: FAMILY MEDICINE

## 2023-01-09 PROCEDURE — 3078F DIAST BP <80 MM HG: CPT | Performed by: FAMILY MEDICINE

## 2023-01-09 PROCEDURE — 99214 OFFICE O/P EST MOD 30 MIN: CPT | Performed by: FAMILY MEDICINE

## 2023-01-09 RX ORDER — PREGABALIN 75 MG/1
75 CAPSULE ORAL 2 TIMES DAILY
Qty: 60 CAPSULE | Refills: 2 | Status: SHIPPED | OUTPATIENT
Start: 2023-01-09 | End: 2023-02-08

## 2023-01-09 ASSESSMENT — PATIENT HEALTH QUESTIONNAIRE - PHQ9
9. THOUGHTS THAT YOU WOULD BE BETTER OFF DEAD, OR OF HURTING YOURSELF: 0
1. LITTLE INTEREST OR PLEASURE IN DOING THINGS: 0
SUM OF ALL RESPONSES TO PHQ QUESTIONS 1-9: 10
2. FEELING DOWN, DEPRESSED OR HOPELESS: 2
7. TROUBLE CONCENTRATING ON THINGS, SUCH AS READING THE NEWSPAPER OR WATCHING TELEVISION: 2
8. MOVING OR SPEAKING SO SLOWLY THAT OTHER PEOPLE COULD HAVE NOTICED. OR THE OPPOSITE, BEING SO FIGETY OR RESTLESS THAT YOU HAVE BEEN MOVING AROUND A LOT MORE THAN USUAL: 0
4. FEELING TIRED OR HAVING LITTLE ENERGY: 3
SUM OF ALL RESPONSES TO PHQ QUESTIONS 1-9: 10
SUM OF ALL RESPONSES TO PHQ9 QUESTIONS 1 & 2: 2
5. POOR APPETITE OR OVEREATING: 1
SUM OF ALL RESPONSES TO PHQ QUESTIONS 1-9: 10
6. FEELING BAD ABOUT YOURSELF - OR THAT YOU ARE A FAILURE OR HAVE LET YOURSELF OR YOUR FAMILY DOWN: 0
SUM OF ALL RESPONSES TO PHQ QUESTIONS 1-9: 10
3. TROUBLE FALLING OR STAYING ASLEEP: 2
10. IF YOU CHECKED OFF ANY PROBLEMS, HOW DIFFICULT HAVE THESE PROBLEMS MADE IT FOR YOU TO DO YOUR WORK, TAKE CARE OF THINGS AT HOME, OR GET ALONG WITH OTHER PEOPLE: 1

## 2023-01-09 NOTE — PROGRESS NOTES
Adriana PAZ [address]  Dept: [phone]  Dept Fax: [phone]    Visit type: Established patient    Reason for Visit: Medication Refill         Assessment and Plan       1. Neuralgia  -     pregabalin (LYRICA) 75 MG capsule; Take 1 capsule by mouth 2 times daily for 30 days. , Disp-60 capsule, R-2Normal  2. Type 2 diabetes mellitus without complication, with long-term current use of insulin (HCC)  -     CBC with Auto Differential; Future  -     Comprehensive Metabolic Panel; Future  -     Hemoglobin A1C; Future  -     Microalbumin / Creatinine Urine Ratio; Future  -     TSH; Future  3. Primary hypertension  4. Hyperlipidemia, unspecified hyperlipidemia type  -     Lipid Panel; Future    With patient doing well with his current medications we will continue at this time continue to monitor and adjust as course dictates. Stressed the importance of being diligent with his diabetic diet, discussed the importance of continued home monitoring of his blood pressure, and discussed dietary and lifestyle modifications that may be beneficial.  Discussed signs symptoms requiring medical attention. All questions were answered and patient voiced understanding and agreement with plan as discussed. Return in about 3 months (around 4/9/2023), or if symptoms worsen or fail to improve. Subjective       HPI   Patient has a history of chronic back pain with associated right leg nerve pain and has been doing well with the use of Lyrica. Denies any issues with use of medicine or any recent changes to his symptoms. Patient has a history of type 2 diabetes and is doing well with his current medication. he denies any issues with the use of the medicine. he denies any new symptoms associated with his diabetes. he is attempting to be diligent with his diet and compliant with his medication. Patient has arterial hypertension that is doing well with his current medication.   he denies any issues with use of his  medicine. he denies any symptoms associated with abnormal blood pressures. he is attempting to avoid excess salt intake. Patient has hyperlipidemia and reports that he is tolerating his Lipitor without any new myalgias or GI upsets. he is attempting to watch his diet and trying to stay physically active. Patient has a history of depression and reports that he is doing well and denies any issues with his mood or any interest in medication at this time. Review of Systems   Constitutional:  Negative for activity change, appetite change, fatigue and fever. HENT:  Negative for congestion, rhinorrhea and sore throat. Eyes:  Negative for pain and discharge. Respiratory:  Negative for cough, shortness of breath and wheezing. Cardiovascular:  Negative for chest pain and palpitations. Gastrointestinal:  Negative for abdominal pain, constipation, diarrhea, nausea and vomiting. Endocrine: Negative for cold intolerance and heat intolerance. Genitourinary:  Negative for dysuria and hematuria. Musculoskeletal:  Positive for back pain and myalgias. Negative for gait problem and neck pain. Skin:  Negative for rash and wound. Neurological:  Negative for syncope and weakness. Hematological:  Negative for adenopathy. Does not bruise/bleed easily. Psychiatric/Behavioral:  Negative for dysphoric mood and sleep disturbance. The patient is not nervous/anxious.        No Known Allergies    Outpatient Medications Prior to Visit   Medication Sig Dispense Refill    lisinopril (PRINIVIL;ZESTRIL) 40 MG tablet Take 1 tablet by mouth once daily 30 tablet 0    atorvastatin (LIPITOR) 80 MG tablet Take 1 tablet by mouth once daily 30 tablet 0    Semaglutide,0.25 or 0.5MG/DOS, (OZEMPIC, 0.25 OR 0.5 MG/DOSE,) 2 MG/1.5ML SOPN Inject 0.5 mg into the skin once a week 4 Adjustable Dose Pre-filled Pen Syringe 1    methocarbamol (ROBAXIN) 500 MG tablet TAKE 1 TABLET BY MOUTH 4 TIMES DAILY AS NEEDED FOR MUSCLE SPASM 120 tablet 0    sildenafil (VIAGRA) 100 MG tablet Take 1 tablet by mouth as needed for Erectile Dysfunction 30 tablet 2    pregabalin (LYRICA) 75 MG capsule Take 1 capsule by mouth 2 times daily for 30 days. 60 capsule 2    montelukast (SINGULAIR) 10 MG tablet Take 1 tablet by mouth daily 30 tablet 3    fluticasone (FLONASE) 50 MCG/ACT nasal spray 2 sprays by Each Nostril route daily 16 g 2    levocetirizine (XYZAL) 5 MG tablet Take 1 tablet by mouth nightly 30 tablet 2    blood glucose monitor strips Test 3 times a day & as needed for symptoms of irregular blood glucose. Dispense sufficient amount for indicated testing frequency plus additional to accommodate PRN testing needs. 100 strip 2    Continuous Blood Gluc  (DEXCOM G6 ) YUNG 1 device continuous route does not apply. 1 each 0    insulin 70-30 (HUMULIN;NOVOLIN) (70-30) 100 UNIT per ML injection vial Inject 100 Units into the skin 2 times daily 6 each 5    Continuous Blood Gluc Transmit (DEXCOM G6 TRANSMITTER) MISC 1 each by Does not apply route continuous 3 each 3    Continuous Blood Gluc Sensor (DEXCOM G6 SENSOR) MISC Change sensor every 10 days 3 each 3    diclofenac (CATAFLAM) 50 MG tablet Take 1 tablet by mouth 2 times daily 180 tablet 1     No facility-administered medications prior to visit.         Past Medical History:   Diagnosis Date    Anxiety     Depression     Neuropathy     Obstructive sleep apnea syndrome     Panic attacks     Primary hypertension     Type 2 diabetes mellitus without complication, with long-term current use of insulin ([facility])         Social History     Tobacco Use    Smoking status: Never    Smokeless tobacco: Never   Substance Use Topics    Alcohol use: Not Currently        Past Surgical History:   Procedure Laterality Date    COLONOSCOPY N/A 03/25/2022    Dr Gil Hart, AT (x5) W Dysplasia, TVA  (-)Dysplasia, int hemorrhoids Gr 2 wo bleeding, sub prep fair, 1 year recall Family History   Problem Relation Age of Onset    Heart Disease Mother     Heart Disease Father     Diabetes Father        Objective       /76 (Site: Right Upper Arm, Position: Sitting, Cuff Size: Large Adult)   Pulse 84   Temp 98.4 °F (36.9 °C) (Temporal)   Ht 6' (1.829 m)   Wt 287 lb (130.2 kg)   SpO2 97%   BMI 38.92 kg/m²   Physical Exam  Vitals and nursing note reviewed. Constitutional:       General: He is not in acute distress. Appearance: Normal appearance. He is well-developed. He is not diaphoretic. HENT:      Head: Normocephalic and atraumatic. Right Ear: External ear normal.      Left Ear: External ear normal.      Nose: Nose normal.      Mouth/Throat:      Mouth: Mucous membranes are moist.      Pharynx: Oropharynx is clear. Eyes:      General: No scleral icterus. Right eye: No discharge. Left eye: No discharge. Conjunctiva/sclera: Conjunctivae normal.   Neck:      Trachea: No tracheal deviation. Cardiovascular:      Rate and Rhythm: Normal rate and regular rhythm. Heart sounds: Normal heart sounds. No murmur heard. No friction rub. No gallop. Pulmonary:      Effort: Pulmonary effort is normal. No respiratory distress. Breath sounds: Normal breath sounds. No wheezing or rales. Abdominal:      General: Bowel sounds are normal. There is no distension. Palpations: Abdomen is soft. Tenderness: There is no abdominal tenderness. Musculoskeletal:         General: No deformity (No gross deformities of upper or lower extremities) or signs of injury. Cervical back: Normal range of motion and neck supple. No muscular tenderness. Right lower leg: No edema. Left lower leg: No edema. Lymphadenopathy:      Cervical: No cervical adenopathy. Skin:     General: Skin is warm and dry. Findings: No erythema or rash. Neurological:      Mental Status: He is alert and oriented to person, place, and time.       Cranial Nerves: No cranial nerve deficit. Psychiatric:         Behavior: Behavior normal.         Thought Content:  Thought content normal.         Data Reviewed and Summarized       Labs:     Imaging/Testing:        Asha Bridges MD

## 2023-01-12 ENCOUNTER — TELEPHONE (OUTPATIENT)
Dept: FAMILY MEDICINE CLINIC | Age: 53
End: 2023-01-12

## 2023-01-18 ASSESSMENT — ENCOUNTER SYMPTOMS
EYE DISCHARGE: 0
COUGH: 0
VOMITING: 0
SHORTNESS OF BREATH: 0
BACK PAIN: 1
RHINORRHEA: 0
EYE PAIN: 0
ABDOMINAL PAIN: 0
DIARRHEA: 0
CONSTIPATION: 0
NAUSEA: 0
WHEEZING: 0
SORE THROAT: 0

## 2023-01-26 DIAGNOSIS — M62.830 BACK MUSCLE SPASM: ICD-10-CM

## 2023-01-26 RX ORDER — METHOCARBAMOL 500 MG/1
TABLET, FILM COATED ORAL
Qty: 120 TABLET | Refills: 0 | Status: SHIPPED | OUTPATIENT
Start: 2023-01-26

## 2023-01-26 NOTE — TELEPHONE ENCOUNTER
Last OV 1/9/2023  Next OV Visit date not found      Requested Prescriptions     Pending Prescriptions Disp Refills    methocarbamol (ROBAXIN) 500 MG tablet [Pharmacy Med Name: Methocarbamol 500 MG Oral Tablet] 120 tablet 0     Sig: TAKE 1 TABLET BY MOUTH 4 TIMES DAILY AS NEEDED FOR MUSCLE SPASM

## 2023-02-23 DIAGNOSIS — E78.5 HYPERLIPIDEMIA, UNSPECIFIED HYPERLIPIDEMIA TYPE: ICD-10-CM

## 2023-02-23 DIAGNOSIS — I10 PRIMARY HYPERTENSION: ICD-10-CM

## 2023-02-23 DIAGNOSIS — M62.830 BACK MUSCLE SPASM: ICD-10-CM

## 2023-02-24 DIAGNOSIS — Z79.4 TYPE 2 DIABETES MELLITUS WITHOUT COMPLICATION, WITH LONG-TERM CURRENT USE OF INSULIN (HCC): ICD-10-CM

## 2023-02-24 DIAGNOSIS — E11.9 TYPE 2 DIABETES MELLITUS WITHOUT COMPLICATION, WITH LONG-TERM CURRENT USE OF INSULIN (HCC): ICD-10-CM

## 2023-02-24 NOTE — TELEPHONE ENCOUNTER
Soraya Renee called to request a refill on his medication.       Last office visit : 1/9/2023   Next office visit : Visit date not found     Requested Prescriptions     Pending Prescriptions Disp Refills    HUMULIN 70/30 (70-30) 100 UNIT/ML injection vial [Pharmacy Med Name: HumuLIN 70/30 (70-30) 100 UNIT/ML Subcutaneous Suspension] 60 mL 0     Sig: INJECT 100 UNITS SUBCUTANEOUSLY TWICE DAILY            [place], [place]

## 2023-02-26 RX ORDER — LISINOPRIL 40 MG/1
TABLET ORAL
Qty: 30 TABLET | Refills: 2 | Status: SHIPPED | OUTPATIENT
Start: 2023-02-26

## 2023-02-26 RX ORDER — INSULIN NPH HUM/REG INSULIN HM 70-30/ML
VIAL (ML) SUBCUTANEOUS
Qty: 60 ML | Refills: 0 | Status: SHIPPED | OUTPATIENT
Start: 2023-02-26

## 2023-02-26 RX ORDER — METHOCARBAMOL 500 MG/1
TABLET, FILM COATED ORAL
Qty: 120 TABLET | Refills: 2 | Status: SHIPPED | OUTPATIENT
Start: 2023-02-26

## 2023-02-26 RX ORDER — ATORVASTATIN CALCIUM 80 MG/1
TABLET, FILM COATED ORAL
Qty: 30 TABLET | Refills: 2 | Status: SHIPPED | OUTPATIENT
Start: 2023-02-26

## 2023-03-27 DIAGNOSIS — Z79.4 TYPE 2 DIABETES MELLITUS WITHOUT COMPLICATION, WITH LONG-TERM CURRENT USE OF INSULIN (HCC): ICD-10-CM

## 2023-03-27 DIAGNOSIS — E11.9 TYPE 2 DIABETES MELLITUS WITHOUT COMPLICATION, WITH LONG-TERM CURRENT USE OF INSULIN (HCC): ICD-10-CM

## 2023-03-27 RX ORDER — INSULIN NPH HUM/REG INSULIN HM 70-30/ML
VIAL (ML) SUBCUTANEOUS
Qty: 60 ML | Refills: 2 | Status: SHIPPED | OUTPATIENT
Start: 2023-03-27

## 2023-03-27 NOTE — TELEPHONE ENCOUNTER
Last OV 1/9/2023  Next OV Visit date not found      Requested Prescriptions     Pending Prescriptions Disp Refills    HUMULIN 70/30 (70-30) 100 UNIT/ML injection vial [Pharmacy Med Name: HumuLIN 70/30 (70-30) 100 UNIT/ML Subcutaneous Suspension] 60 mL 0     Sig: INJECT 100 UNITS SUBCUTANEOUSLY TWICE DAILY

## 2023-04-28 DIAGNOSIS — M79.2 NEURALGIA: ICD-10-CM

## 2023-05-01 RX ORDER — PREGABALIN 75 MG/1
CAPSULE ORAL
Qty: 60 CAPSULE | Refills: 0 | OUTPATIENT
Start: 2023-05-01

## 2023-05-23 DIAGNOSIS — E78.5 HYPERLIPIDEMIA, UNSPECIFIED HYPERLIPIDEMIA TYPE: ICD-10-CM

## 2023-05-23 DIAGNOSIS — I10 PRIMARY HYPERTENSION: ICD-10-CM

## 2023-05-23 DIAGNOSIS — M62.830 BACK MUSCLE SPASM: ICD-10-CM

## 2023-05-23 RX ORDER — METHOCARBAMOL 500 MG/1
500 TABLET, FILM COATED ORAL 4 TIMES DAILY
Qty: 120 TABLET | Refills: 0 | Status: SHIPPED | OUTPATIENT
Start: 2023-05-23

## 2023-05-23 RX ORDER — LISINOPRIL 40 MG/1
40 TABLET ORAL DAILY
Qty: 30 TABLET | Refills: 0 | Status: SHIPPED | OUTPATIENT
Start: 2023-05-23

## 2023-05-23 RX ORDER — ATORVASTATIN CALCIUM 80 MG/1
80 TABLET, FILM COATED ORAL DAILY
Qty: 30 TABLET | Refills: 0 | Status: SHIPPED | OUTPATIENT
Start: 2023-05-23

## 2023-05-23 NOTE — TELEPHONE ENCOUNTER
Last OV 1/9/2023  Next OV Visit date not found      Requested Prescriptions     Pending Prescriptions Disp Refills    atorvastatin (LIPITOR) 80 MG tablet [Pharmacy Med Name: Atorvastatin Calcium 80 MG Oral Tablet] 30 tablet 0     Sig: Take 1 tablet by mouth daily    lisinopril (PRINIVIL;ZESTRIL) 40 MG tablet [Pharmacy Med Name: Lisinopril 40 MG Oral Tablet] 30 tablet 0     Sig: Take 1 tablet by mouth daily    methocarbamol (ROBAXIN) 500 MG tablet [Pharmacy Med Name: Methocarbamol 500 MG Oral Tablet] 120 tablet 0     Sig: Take 1 tablet by mouth 4 times daily

## 2023-06-21 DIAGNOSIS — E11.9 TYPE 2 DIABETES MELLITUS WITHOUT COMPLICATION, WITH LONG-TERM CURRENT USE OF INSULIN (HCC): ICD-10-CM

## 2023-06-21 DIAGNOSIS — Z79.4 TYPE 2 DIABETES MELLITUS WITHOUT COMPLICATION, WITH LONG-TERM CURRENT USE OF INSULIN (HCC): ICD-10-CM

## 2023-06-21 RX ORDER — INSULIN HUMAN 100 [IU]/ML
INJECTION, SUSPENSION SUBCUTANEOUS
Qty: 60 ML | Refills: 0 | Status: SHIPPED | OUTPATIENT
Start: 2023-06-21

## 2023-06-27 DIAGNOSIS — E78.5 HYPERLIPIDEMIA, UNSPECIFIED HYPERLIPIDEMIA TYPE: ICD-10-CM

## 2023-06-27 DIAGNOSIS — M79.2 NEURALGIA: ICD-10-CM

## 2023-06-27 DIAGNOSIS — I10 PRIMARY HYPERTENSION: ICD-10-CM

## 2023-06-27 DIAGNOSIS — M62.830 BACK MUSCLE SPASM: ICD-10-CM

## 2023-06-28 RX ORDER — ATORVASTATIN CALCIUM 80 MG/1
TABLET, FILM COATED ORAL
Qty: 30 TABLET | Refills: 0 | Status: SHIPPED | OUTPATIENT
Start: 2023-06-28

## 2023-06-28 RX ORDER — PREGABALIN 75 MG/1
CAPSULE ORAL
Qty: 60 CAPSULE | Refills: 0 | OUTPATIENT
Start: 2023-06-28

## 2023-06-28 RX ORDER — LISINOPRIL 40 MG/1
TABLET ORAL
Qty: 30 TABLET | Refills: 0 | Status: SHIPPED | OUTPATIENT
Start: 2023-06-28

## 2023-06-28 RX ORDER — METHOCARBAMOL 500 MG/1
500 TABLET, FILM COATED ORAL 4 TIMES DAILY
Qty: 120 TABLET | Refills: 0 | Status: SHIPPED | OUTPATIENT
Start: 2023-06-28

## 2023-07-03 DIAGNOSIS — E78.5 HYPERLIPIDEMIA, UNSPECIFIED HYPERLIPIDEMIA TYPE: ICD-10-CM

## 2023-07-03 DIAGNOSIS — I10 PRIMARY HYPERTENSION: ICD-10-CM

## 2023-07-03 DIAGNOSIS — M62.830 BACK MUSCLE SPASM: ICD-10-CM

## 2023-07-03 RX ORDER — ATORVASTATIN CALCIUM 80 MG/1
TABLET, FILM COATED ORAL
Qty: 30 TABLET | Refills: 2 | Status: ON HOLD | OUTPATIENT
Start: 2023-07-03 | End: 2023-08-31

## 2023-07-03 RX ORDER — LISINOPRIL 40 MG/1
TABLET ORAL
Qty: 30 TABLET | Refills: 2 | Status: ON HOLD | OUTPATIENT
Start: 2023-07-03 | End: 2023-08-17 | Stop reason: HOSPADM

## 2023-07-03 RX ORDER — METHOCARBAMOL 500 MG/1
500 TABLET, FILM COATED ORAL 4 TIMES DAILY
Qty: 120 TABLET | Refills: 2 | Status: ON HOLD | OUTPATIENT
Start: 2023-07-03 | End: 2023-08-31

## 2023-07-19 DIAGNOSIS — Z79.4 TYPE 2 DIABETES MELLITUS WITHOUT COMPLICATION, WITH LONG-TERM CURRENT USE OF INSULIN (HCC): ICD-10-CM

## 2023-07-19 DIAGNOSIS — E11.9 TYPE 2 DIABETES MELLITUS WITHOUT COMPLICATION, WITH LONG-TERM CURRENT USE OF INSULIN (HCC): ICD-10-CM

## 2023-07-19 RX ORDER — INSULIN HUMAN 100 [IU]/ML
INJECTION, SUSPENSION SUBCUTANEOUS
Qty: 60 ML | Refills: 0 | Status: SHIPPED | OUTPATIENT
Start: 2023-07-19

## 2023-07-19 NOTE — TELEPHONE ENCOUNTER
Kisha Ocampo called to request a refill on his medication.       Last office visit : 1/9/2023   Next office visit : 7/24/2023     Requested Prescriptions     Pending Prescriptions Disp Refills    HUMULIN 70/30 (70-30) 100 UNIT/ML injection vial [Pharmacy Med Name: HumuLIN 70/30 (70-30) 100 UNIT/ML Subcutaneous Suspension] 60 mL 0     Sig: INJECT 100 UNITS SUBCUTANEOUSLY TWICE DAILY            [place], [place]

## 2023-07-24 ENCOUNTER — OFFICE VISIT (OUTPATIENT)
Dept: FAMILY MEDICINE CLINIC | Age: 53
End: 2023-07-24

## 2023-07-24 VITALS
HEIGHT: 72 IN | TEMPERATURE: 97.5 F | BODY MASS INDEX: 36.44 KG/M2 | SYSTOLIC BLOOD PRESSURE: 132 MMHG | OXYGEN SATURATION: 96 % | DIASTOLIC BLOOD PRESSURE: 78 MMHG | HEART RATE: 74 BPM | WEIGHT: 269 LBS

## 2023-07-24 DIAGNOSIS — B36.0 TINEA VERSICOLOR: ICD-10-CM

## 2023-07-24 DIAGNOSIS — Z79.4 TYPE 2 DIABETES MELLITUS WITHOUT COMPLICATION, WITH LONG-TERM CURRENT USE OF INSULIN (HCC): ICD-10-CM

## 2023-07-24 DIAGNOSIS — Z99.89 OSA ON CPAP: ICD-10-CM

## 2023-07-24 DIAGNOSIS — K21.9 GASTROESOPHAGEAL REFLUX DISEASE, UNSPECIFIED WHETHER ESOPHAGITIS PRESENT: ICD-10-CM

## 2023-07-24 DIAGNOSIS — M79.2 NEURALGIA: Primary | ICD-10-CM

## 2023-07-24 DIAGNOSIS — E78.5 HYPERLIPIDEMIA, UNSPECIFIED HYPERLIPIDEMIA TYPE: ICD-10-CM

## 2023-07-24 DIAGNOSIS — N45.1 EPIDIDYMITIS: ICD-10-CM

## 2023-07-24 DIAGNOSIS — I10 PRIMARY HYPERTENSION: ICD-10-CM

## 2023-07-24 DIAGNOSIS — E11.9 TYPE 2 DIABETES MELLITUS WITHOUT COMPLICATION, WITH LONG-TERM CURRENT USE OF INSULIN (HCC): ICD-10-CM

## 2023-07-24 DIAGNOSIS — G47.33 OSA ON CPAP: ICD-10-CM

## 2023-07-24 RX ORDER — PANTOPRAZOLE SODIUM 40 MG/1
40 TABLET, DELAYED RELEASE ORAL
Qty: 30 TABLET | Refills: 2 | Status: SHIPPED | OUTPATIENT
Start: 2023-07-24

## 2023-07-24 RX ORDER — ITRACONAZOLE 100 MG/1
200 CAPSULE ORAL DAILY
Qty: 10 CAPSULE | Refills: 0 | Status: SHIPPED | OUTPATIENT
Start: 2023-07-24 | End: 2023-07-29

## 2023-07-24 RX ORDER — PREGABALIN 75 MG/1
75 CAPSULE ORAL 2 TIMES DAILY
Qty: 60 CAPSULE | Refills: 2 | Status: SHIPPED | OUTPATIENT
Start: 2023-07-24 | End: 2024-07-23

## 2023-07-24 NOTE — PROGRESS NOTES
[facility]  [address] [address]  Dept: [phone]  Dept Fax: [phone]    Visit type: {New vs Established:[id number]::\"Established patient\"}    Reason for Visit: Gastroesophageal Reflux (Ongoing 1 week with stomach cramping ), Testicle Pain (\"Tender\" and lump--Right side. Denies hematuria and swelling ), and Orders (CPAP mask)         Assessment and Plan       1. Neuralgia  The following orders have not been finalized:  -     pregabalin (LYRICA) 75 MG capsule  2. Hyperlipidemia, unspecified hyperlipidemia type  3. Primary hypertension  4. Type 2 diabetes mellitus without complication, with long-term current use of insulin (HCC)  The following orders have not been finalized:  -     insulin 70-30 (HUMULIN 70/30) (70-30) 100 UNIT per ML injection vial      No follow-ups on file. Subjective       HPI   Terrye Shoulder- pepto came on suddenly- cramping with eating. He states that he has had some tenderness in the scrotum but not the testis. Cpap- has had problems     DM doing well. Lyrica- doing well. Review of Systems     No Known Allergies    Outpatient Medications Prior to Visit   Medication Sig Dispense Refill    HUMULIN 70/30 (70-30) 100 UNIT/ML injection vial INJECT 100 UNITS SUBCUTANEOUSLY TWICE DAILY 60 mL 0    atorvastatin (LIPITOR) 80 MG tablet Take 1 tablet by mouth once daily 30 tablet 2    lisinopril (PRINIVIL;ZESTRIL) 40 MG tablet Take 1 tablet by mouth once daily 30 tablet 2    methocarbamol (ROBAXIN) 500 MG tablet Take 1 tablet by mouth 4 times daily 120 tablet 2    pregabalin (LYRICA) 75 MG capsule Take 1 capsule by mouth 2 times daily for 30 days.  60 capsule 2    Semaglutide,0.25 or 0.5MG/DOS, (OZEMPIC, 0.25 OR 0.5 MG/DOSE,) 2 MG/1.5ML SOPN Inject 0.5 mg into the skin once a week 4 Adjustable Dose Pre-filled Pen Syringe 1    sildenafil (VIAGRA) 100 MG tablet Take 1 tablet by mouth as needed for Erectile Dysfunction 30 tablet 2    montelukast (SINGULAIR) 10

## 2023-07-25 ENCOUNTER — TELEPHONE (OUTPATIENT)
Dept: FAMILY MEDICINE CLINIC | Age: 53
End: 2023-07-25

## 2023-07-25 DIAGNOSIS — G47.33 OSA ON CPAP: Primary | ICD-10-CM

## 2023-07-25 DIAGNOSIS — Z99.89 OSA ON CPAP: Primary | ICD-10-CM

## 2023-07-28 ENCOUNTER — PATIENT MESSAGE (OUTPATIENT)
Dept: FAMILY MEDICINE CLINIC | Age: 53
End: 2023-07-28

## 2023-07-30 ASSESSMENT — ENCOUNTER SYMPTOMS
SHORTNESS OF BREATH: 0
EYE PAIN: 0
WHEEZING: 0
NAUSEA: 0
RHINORRHEA: 0
ABDOMINAL PAIN: 0
DIARRHEA: 0
EYE DISCHARGE: 0
COUGH: 0
CONSTIPATION: 0
SORE THROAT: 0
VOMITING: 0
BACK PAIN: 1

## 2023-07-31 NOTE — TELEPHONE ENCOUNTER
From: Rowan Espinosa  To: Dr. Lyubov Be: 7/28/2023 6:20 PM CDT  Subject: Itraconazol    Hi my last prescription above says they need a prior authorization?

## 2023-08-07 DIAGNOSIS — Z99.89 OSA ON CPAP: Primary | ICD-10-CM

## 2023-08-07 DIAGNOSIS — G47.33 OSA ON CPAP: Primary | ICD-10-CM

## 2023-08-09 ENCOUNTER — OFFICE VISIT (OUTPATIENT)
Dept: FAMILY MEDICINE CLINIC | Age: 53
End: 2023-08-09
Payer: COMMERCIAL

## 2023-08-09 VITALS
HEIGHT: 72 IN | TEMPERATURE: 97 F | OXYGEN SATURATION: 99 % | WEIGHT: 270.4 LBS | HEART RATE: 92 BPM | SYSTOLIC BLOOD PRESSURE: 130 MMHG | DIASTOLIC BLOOD PRESSURE: 70 MMHG | RESPIRATION RATE: 14 BRPM | BODY MASS INDEX: 36.62 KG/M2

## 2023-08-09 DIAGNOSIS — R10.13 EPIGASTRIC PAIN: Primary | ICD-10-CM

## 2023-08-09 DIAGNOSIS — K21.9 GASTROESOPHAGEAL REFLUX DISEASE, UNSPECIFIED WHETHER ESOPHAGITIS PRESENT: ICD-10-CM

## 2023-08-09 PROCEDURE — 3078F DIAST BP <80 MM HG: CPT | Performed by: FAMILY MEDICINE

## 2023-08-09 PROCEDURE — 3074F SYST BP LT 130 MM HG: CPT | Performed by: FAMILY MEDICINE

## 2023-08-09 PROCEDURE — 99213 OFFICE O/P EST LOW 20 MIN: CPT | Performed by: FAMILY MEDICINE

## 2023-08-09 RX ORDER — RABEPRAZOLE SODIUM 20 MG/1
20 TABLET, DELAYED RELEASE ORAL DAILY
Qty: 90 TABLET | Refills: 1 | Status: SHIPPED | OUTPATIENT
Start: 2023-08-09

## 2023-08-09 RX ORDER — SUCRALFATE 1 G/1
1 TABLET ORAL 4 TIMES DAILY
Qty: 120 TABLET | Refills: 2 | Status: SHIPPED | OUTPATIENT
Start: 2023-08-09

## 2023-08-09 NOTE — PROGRESS NOTES
[facility]  [address] [address]  Dept: [phone]  Dept Fax: [phone]    Visit type: Established patient    Reason for Visit: Abdominal Pain (Patient is here today for stomach pain, x1 month. Patient states that the Protonix has not helped. ) and Follow-up         Assessment and Plan       1. Epigastric pain  -     RABEprazole (ACIPHEX) 20 MG tablet; Take 1 tablet by mouth daily, Disp-90 tablet, R-1Normal  -     sucralfate (CARAFATE) 1 GM tablet; Take 1 tablet by mouth 4 times daily, Disp-120 tablet, R-2Normal  -     H. Pylori Antigen, Stool; Future  -     Raj Pepper MD, Gastroenterology, [facility]  2. Gastroesophageal reflux disease, unspecified whether esophagitis present  -     RABEprazole (ACIPHEX) 20 MG tablet; Take 1 tablet by mouth daily, Disp-90 tablet, R-1Normal    Discussed with his symptoms a possibility getting set up with GI for further evaluation and recommendations and patient is also concerned about the possibility of H. pylori and is wanting be tested. Discussed proper use of medication and potential side effect. Discussed dietary and lifestyle modifications that may beneficial.  All questions were answered patient voiced understanding agreement plan discussed. Return if symptoms worsen or fail to improve, for next scheduled follow up with PCP. Subjective       HPI   Hasn't tolerated protonix, prilosec or nexium. He is having more issues is gerd and upset stomach and stomach pain. He states that he has tolerated aciphex in the past.     Review of Systems   Constitutional:  Negative for activity change, appetite change, fatigue and fever. HENT:  Negative for congestion, rhinorrhea and sore throat. Eyes:  Negative for pain and discharge. Respiratory:  Negative for cough, shortness of breath and wheezing. Cardiovascular:  Negative for chest pain and palpitations. Gastrointestinal:  Positive for abdominal pain.  Negative for

## 2023-08-10 DIAGNOSIS — R10.13 EPIGASTRIC PAIN: ICD-10-CM

## 2023-08-12 LAB — H PYLORI AG STL QL IA: NEGATIVE

## 2023-08-15 ENCOUNTER — APPOINTMENT (OUTPATIENT)
Dept: CT IMAGING | Age: 53
DRG: 300 | End: 2023-08-15
Payer: COMMERCIAL

## 2023-08-15 ENCOUNTER — TELEPHONE (OUTPATIENT)
Dept: HEMATOLOGY | Age: 53
End: 2023-08-15

## 2023-08-15 ENCOUNTER — HOSPITAL ENCOUNTER (INPATIENT)
Age: 53
LOS: 2 days | Discharge: ANOTHER HOSPITAL | DRG: 300 | End: 2023-08-17
Attending: STUDENT IN AN ORGANIZED HEALTH CARE EDUCATION/TRAINING PROGRAM | Admitting: STUDENT IN AN ORGANIZED HEALTH CARE EDUCATION/TRAINING PROGRAM
Payer: COMMERCIAL

## 2023-08-15 ENCOUNTER — APPOINTMENT (OUTPATIENT)
Dept: GENERAL RADIOLOGY | Age: 53
DRG: 300 | End: 2023-08-15
Attending: STUDENT IN AN ORGANIZED HEALTH CARE EDUCATION/TRAINING PROGRAM
Payer: COMMERCIAL

## 2023-08-15 DIAGNOSIS — R63.4 UNINTENTIONAL WEIGHT LOSS OF MORE THAN 10 POUNDS IN 90 DAYS: ICD-10-CM

## 2023-08-15 DIAGNOSIS — R10.84 GENERALIZED ABDOMINAL PAIN: ICD-10-CM

## 2023-08-15 DIAGNOSIS — I82.403 DEEP VEIN THROMBOSIS (DVT) OF BOTH LOWER EXTREMITIES, UNSPECIFIED CHRONICITY, UNSPECIFIED VEIN (HCC): ICD-10-CM

## 2023-08-15 DIAGNOSIS — R68.81 EARLY SATIETY: ICD-10-CM

## 2023-08-15 DIAGNOSIS — N17.9 AKI (ACUTE KIDNEY INJURY) (HCC): Primary | ICD-10-CM

## 2023-08-15 DIAGNOSIS — E86.0 DEHYDRATION: ICD-10-CM

## 2023-08-15 PROBLEM — I82.4Z3 DVT, LOWER EXTREMITY, DISTAL, ACUTE, BILATERAL (HCC): Status: ACTIVE | Noted: 2023-08-15

## 2023-08-15 LAB
ALBUMIN SERPL-MCNC: 3.4 G/DL (ref 3.5–5.2)
ALP SERPL-CCNC: 106 U/L (ref 40–130)
ALT SERPL-CCNC: 24 U/L (ref 5–41)
AMORPH SED URNS QL MICRO: ABNORMAL /HPF
ANION GAP SERPL CALCULATED.3IONS-SCNC: 16 MMOL/L (ref 7–19)
APTT PPP: 176.2 SEC (ref 26–36.2)
APTT PPP: 30.9 SEC (ref 26–36.2)
AST SERPL-CCNC: 23 U/L (ref 5–40)
BACTERIA #/AREA URNS HPF: ABNORMAL /HPF
BASOPHILS # BLD: 0.1 K/UL (ref 0–0.2)
BASOPHILS NFR BLD: 0.4 % (ref 0–1)
BILIRUB SERPL-MCNC: 1.3 MG/DL (ref 0.2–1.2)
BILIRUB UR QL STRIP: ABNORMAL
BNP BLD-MCNC: 90 PG/ML (ref 0–124)
BUN SERPL-MCNC: 45 MG/DL (ref 6–20)
CALCIUM SERPL-MCNC: 9 MG/DL (ref 8.6–10)
CHLORIDE SERPL-SCNC: 93 MMOL/L (ref 98–111)
CK SERPL-CCNC: 31 U/L (ref 39–308)
CLARITY UR: ABNORMAL
CO2 SERPL-SCNC: 24 MMOL/L (ref 22–29)
COLOR UR: ABNORMAL
CREAT SERPL-MCNC: 2.9 MG/DL (ref 0.5–1.2)
EOSINOPHIL # BLD: 0.3 K/UL (ref 0–0.6)
EOSINOPHIL NFR BLD: 2.1 % (ref 0–5)
ERYTHROCYTE [DISTWIDTH] IN BLOOD BY AUTOMATED COUNT: 17.2 % (ref 11.5–14.5)
GLUCOSE BLD-MCNC: 117 MG/DL (ref 70–99)
GLUCOSE BLD-MCNC: 81 MG/DL (ref 70–99)
GLUCOSE BLD-MCNC: 90 MG/DL (ref 70–99)
GLUCOSE SERPL-MCNC: 165 MG/DL (ref 74–109)
GLUCOSE UR STRIP.AUTO-MCNC: NEGATIVE MG/DL
HBA1C MFR BLD: 5.7 % (ref 4–6)
HCT VFR BLD AUTO: 43.5 % (ref 42–52)
HGB BLD-MCNC: 13.2 G/DL (ref 14–18)
HGB UR STRIP.AUTO-MCNC: NEGATIVE MG/L
HYALINE CASTS #/AREA URNS LPF: ABNORMAL /LPF (ref 0–5)
IMM GRANULOCYTES # BLD: 0.1 K/UL
KETONES UR STRIP.AUTO-MCNC: NEGATIVE MG/DL
LACTATE BLDV-SCNC: 1.6 MMOL/L (ref 0.5–1.9)
LEUKOCYTE ESTERASE UR QL STRIP.AUTO: ABNORMAL
LIPASE SERPL-CCNC: 28 U/L (ref 13–60)
LYMPHOCYTES # BLD: 1.4 K/UL (ref 1.1–4.5)
LYMPHOCYTES NFR BLD: 8.8 % (ref 20–40)
MCH RBC QN AUTO: 24.4 PG (ref 27–31)
MCHC RBC AUTO-ENTMCNC: 30.3 G/DL (ref 33–37)
MCV RBC AUTO: 80.6 FL (ref 80–94)
MONOCYTES # BLD: 0.9 K/UL (ref 0–0.9)
MONOCYTES NFR BLD: 5.8 % (ref 0–10)
NEUTROPHILS # BLD: 13.3 K/UL (ref 1.5–7.5)
NEUTS SEG NFR BLD: 82.1 % (ref 50–65)
NITRITE UR QL STRIP.AUTO: NEGATIVE
PERFORMED ON: ABNORMAL
PERFORMED ON: NORMAL
PERFORMED ON: NORMAL
PH UR STRIP.AUTO: 5 [PH] (ref 5–8)
PLATELET # BLD AUTO: 270 K/UL (ref 130–400)
PMV BLD AUTO: 10.6 FL (ref 9.4–12.4)
POTASSIUM SERPL-SCNC: 4.4 MMOL/L (ref 3.5–5)
PROT SERPL-MCNC: 7.8 G/DL (ref 6.6–8.7)
PROT UR STRIP.AUTO-MCNC: 100 MG/DL
RBC # BLD AUTO: 5.4 M/UL (ref 4.7–6.1)
RBC #/AREA URNS HPF: ABNORMAL /HPF (ref 0–2)
SODIUM SERPL-SCNC: 133 MMOL/L (ref 136–145)
SP GR UR STRIP.AUTO: 1.03 (ref 1–1.03)
SQUAMOUS #/AREA URNS HPF: ABNORMAL /HPF
TSH SERPL DL<=0.005 MIU/L-ACNC: 3.64 UIU/ML (ref 0.27–4.2)
UROBILINOGEN UR STRIP.AUTO-MCNC: 1 E.U./DL
WBC # BLD AUTO: 16.2 K/UL (ref 4.8–10.8)
WBC #/AREA URNS HPF: ABNORMAL /HPF (ref 0–5)

## 2023-08-15 PROCEDURE — 96360 HYDRATION IV INFUSION INIT: CPT

## 2023-08-15 PROCEDURE — 82550 ASSAY OF CK (CPK): CPT

## 2023-08-15 PROCEDURE — 2580000003 HC RX 258: Performed by: NURSE PRACTITIONER

## 2023-08-15 PROCEDURE — 6370000000 HC RX 637 (ALT 250 FOR IP): Performed by: STUDENT IN AN ORGANIZED HEALTH CARE EDUCATION/TRAINING PROGRAM

## 2023-08-15 PROCEDURE — 93970 EXTREMITY STUDY: CPT

## 2023-08-15 PROCEDURE — 83605 ASSAY OF LACTIC ACID: CPT

## 2023-08-15 PROCEDURE — 82962 GLUCOSE BLOOD TEST: CPT

## 2023-08-15 PROCEDURE — 71046 X-RAY EXAM CHEST 2 VIEWS: CPT

## 2023-08-15 PROCEDURE — 85025 COMPLETE CBC W/AUTO DIFF WBC: CPT

## 2023-08-15 PROCEDURE — 36415 COLL VENOUS BLD VENIPUNCTURE: CPT

## 2023-08-15 PROCEDURE — 81001 URINALYSIS AUTO W/SCOPE: CPT

## 2023-08-15 PROCEDURE — 2580000003 HC RX 258: Performed by: STUDENT IN AN ORGANIZED HEALTH CARE EDUCATION/TRAINING PROGRAM

## 2023-08-15 PROCEDURE — 84443 ASSAY THYROID STIM HORMONE: CPT

## 2023-08-15 PROCEDURE — 71250 CT THORAX DX C-: CPT

## 2023-08-15 PROCEDURE — 83690 ASSAY OF LIPASE: CPT

## 2023-08-15 PROCEDURE — 99285 EMERGENCY DEPT VISIT HI MDM: CPT

## 2023-08-15 PROCEDURE — 6360000002 HC RX W HCPCS: Performed by: STUDENT IN AN ORGANIZED HEALTH CARE EDUCATION/TRAINING PROGRAM

## 2023-08-15 PROCEDURE — 83036 HEMOGLOBIN GLYCOSYLATED A1C: CPT

## 2023-08-15 PROCEDURE — 80053 COMPREHEN METABOLIC PANEL: CPT

## 2023-08-15 PROCEDURE — 1210000000 HC MED SURG R&B

## 2023-08-15 PROCEDURE — 87040 BLOOD CULTURE FOR BACTERIA: CPT

## 2023-08-15 PROCEDURE — 99223 1ST HOSP IP/OBS HIGH 75: CPT | Performed by: INTERNAL MEDICINE

## 2023-08-15 PROCEDURE — 85730 THROMBOPLASTIN TIME PARTIAL: CPT

## 2023-08-15 PROCEDURE — 99223 1ST HOSP IP/OBS HIGH 75: CPT | Performed by: UROLOGY

## 2023-08-15 PROCEDURE — 93005 ELECTROCARDIOGRAM TRACING: CPT | Performed by: STUDENT IN AN ORGANIZED HEALTH CARE EDUCATION/TRAINING PROGRAM

## 2023-08-15 PROCEDURE — 74176 CT ABD & PELVIS W/O CONTRAST: CPT

## 2023-08-15 PROCEDURE — 6370000000 HC RX 637 (ALT 250 FOR IP): Performed by: NURSE PRACTITIONER

## 2023-08-15 PROCEDURE — 83880 ASSAY OF NATRIURETIC PEPTIDE: CPT

## 2023-08-15 PROCEDURE — 6360000002 HC RX W HCPCS: Performed by: NURSE PRACTITIONER

## 2023-08-15 RX ORDER — PANTOPRAZOLE SODIUM 40 MG/1
40 TABLET, DELAYED RELEASE ORAL
Status: DISCONTINUED | OUTPATIENT
Start: 2023-08-16 | End: 2023-08-17 | Stop reason: HOSPADM

## 2023-08-15 RX ORDER — 0.9 % SODIUM CHLORIDE 0.9 %
500 INTRAVENOUS SOLUTION INTRAVENOUS ONCE
Status: COMPLETED | OUTPATIENT
Start: 2023-08-15 | End: 2023-08-15

## 2023-08-15 RX ORDER — SODIUM CHLORIDE 9 MG/ML
INJECTION, SOLUTION INTRAVENOUS PRN
Status: DISCONTINUED | OUTPATIENT
Start: 2023-08-15 | End: 2023-08-17 | Stop reason: HOSPADM

## 2023-08-15 RX ORDER — HEPARIN SODIUM 1000 [USP'U]/ML
80 INJECTION, SOLUTION INTRAVENOUS; SUBCUTANEOUS PRN
Status: DISCONTINUED | OUTPATIENT
Start: 2023-08-15 | End: 2023-08-16

## 2023-08-15 RX ORDER — ATORVASTATIN CALCIUM 80 MG/1
80 TABLET, FILM COATED ORAL DAILY
Status: DISCONTINUED | OUTPATIENT
Start: 2023-08-15 | End: 2023-08-17 | Stop reason: HOSPADM

## 2023-08-15 RX ORDER — TRAZODONE HYDROCHLORIDE 50 MG/1
50 TABLET ORAL NIGHTLY PRN
Status: DISCONTINUED | OUTPATIENT
Start: 2023-08-15 | End: 2023-08-17 | Stop reason: HOSPADM

## 2023-08-15 RX ORDER — HEPARIN SODIUM 10000 [USP'U]/100ML
5-30 INJECTION, SOLUTION INTRAVENOUS CONTINUOUS
Status: DISCONTINUED | OUTPATIENT
Start: 2023-08-15 | End: 2023-08-16

## 2023-08-15 RX ORDER — INSULIN LISPRO 100 [IU]/ML
0-4 INJECTION, SOLUTION INTRAVENOUS; SUBCUTANEOUS EVERY 4 HOURS
Status: DISCONTINUED | OUTPATIENT
Start: 2023-08-15 | End: 2023-08-17 | Stop reason: HOSPADM

## 2023-08-15 RX ORDER — HEPARIN SODIUM 1000 [USP'U]/ML
80 INJECTION, SOLUTION INTRAVENOUS; SUBCUTANEOUS ONCE
Status: COMPLETED | OUTPATIENT
Start: 2023-08-15 | End: 2023-08-15

## 2023-08-15 RX ORDER — ONDANSETRON 2 MG/ML
4 INJECTION INTRAMUSCULAR; INTRAVENOUS EVERY 6 HOURS PRN
Status: DISCONTINUED | OUTPATIENT
Start: 2023-08-15 | End: 2023-08-17 | Stop reason: HOSPADM

## 2023-08-15 RX ORDER — SODIUM CHLORIDE 0.9 % (FLUSH) 0.9 %
5-40 SYRINGE (ML) INJECTION EVERY 12 HOURS SCHEDULED
Status: DISCONTINUED | OUTPATIENT
Start: 2023-08-15 | End: 2023-08-17 | Stop reason: HOSPADM

## 2023-08-15 RX ORDER — HEPARIN SODIUM 1000 [USP'U]/ML
40 INJECTION, SOLUTION INTRAVENOUS; SUBCUTANEOUS PRN
Status: DISCONTINUED | OUTPATIENT
Start: 2023-08-15 | End: 2023-08-16

## 2023-08-15 RX ORDER — ACETAMINOPHEN 325 MG/1
650 TABLET ORAL EVERY 6 HOURS PRN
Status: DISCONTINUED | OUTPATIENT
Start: 2023-08-15 | End: 2023-08-17 | Stop reason: HOSPADM

## 2023-08-15 RX ORDER — MONTELUKAST SODIUM 10 MG/1
10 TABLET ORAL DAILY
Status: DISCONTINUED | OUTPATIENT
Start: 2023-08-15 | End: 2023-08-17 | Stop reason: HOSPADM

## 2023-08-15 RX ORDER — ACETAMINOPHEN 650 MG/1
650 SUPPOSITORY RECTAL EVERY 6 HOURS PRN
Status: DISCONTINUED | OUTPATIENT
Start: 2023-08-15 | End: 2023-08-17 | Stop reason: HOSPADM

## 2023-08-15 RX ORDER — FLUTICASONE PROPIONATE 50 MCG
2 SPRAY, SUSPENSION (ML) NASAL DAILY
Status: DISCONTINUED | OUTPATIENT
Start: 2023-08-15 | End: 2023-08-17 | Stop reason: HOSPADM

## 2023-08-15 RX ORDER — DEXTROSE MONOHYDRATE 100 MG/ML
INJECTION, SOLUTION INTRAVENOUS CONTINUOUS PRN
Status: DISCONTINUED | OUTPATIENT
Start: 2023-08-15 | End: 2023-08-17 | Stop reason: HOSPADM

## 2023-08-15 RX ORDER — SODIUM CHLORIDE, SODIUM LACTATE, POTASSIUM CHLORIDE, CALCIUM CHLORIDE 600; 310; 30; 20 MG/100ML; MG/100ML; MG/100ML; MG/100ML
INJECTION, SOLUTION INTRAVENOUS CONTINUOUS
Status: DISCONTINUED | OUTPATIENT
Start: 2023-08-15 | End: 2023-08-17 | Stop reason: HOSPADM

## 2023-08-15 RX ORDER — ONDANSETRON 4 MG/1
4 TABLET, ORALLY DISINTEGRATING ORAL EVERY 8 HOURS PRN
Status: DISCONTINUED | OUTPATIENT
Start: 2023-08-15 | End: 2023-08-17 | Stop reason: HOSPADM

## 2023-08-15 RX ORDER — SODIUM CHLORIDE 0.9 % (FLUSH) 0.9 %
5-40 SYRINGE (ML) INJECTION PRN
Status: DISCONTINUED | OUTPATIENT
Start: 2023-08-15 | End: 2023-08-17 | Stop reason: HOSPADM

## 2023-08-15 RX ORDER — POLYETHYLENE GLYCOL 3350 17 G/17G
17 POWDER, FOR SOLUTION ORAL DAILY PRN
Status: DISCONTINUED | OUTPATIENT
Start: 2023-08-15 | End: 2023-08-17 | Stop reason: HOSPADM

## 2023-08-15 RX ADMIN — SODIUM CHLORIDE, PRESERVATIVE FREE 10 ML: 5 INJECTION INTRAVENOUS at 20:59

## 2023-08-15 RX ADMIN — SODIUM CHLORIDE 500 ML: 9 INJECTION, SOLUTION INTRAVENOUS at 09:46

## 2023-08-15 RX ADMIN — HEPARIN SODIUM 18 UNITS/KG/HR: 10000 INJECTION, SOLUTION INTRAVENOUS at 12:04

## 2023-08-15 RX ADMIN — TRAZODONE HYDROCHLORIDE 50 MG: 50 TABLET ORAL at 20:54

## 2023-08-15 RX ADMIN — HEPARIN SODIUM 15 UNITS/KG/HR: 10000 INJECTION, SOLUTION INTRAVENOUS at 19:36

## 2023-08-15 RX ADMIN — SODIUM CHLORIDE, POTASSIUM CHLORIDE, SODIUM LACTATE AND CALCIUM CHLORIDE: 600; 310; 30; 20 INJECTION, SOLUTION INTRAVENOUS at 13:48

## 2023-08-15 RX ADMIN — PREGABALIN 75 MG: 25 CAPSULE ORAL at 20:56

## 2023-08-15 RX ADMIN — HEPARIN SODIUM 9820 UNITS: 1000 INJECTION INTRAVENOUS; SUBCUTANEOUS at 11:59

## 2023-08-15 RX ADMIN — SODIUM CHLORIDE, POTASSIUM CHLORIDE, SODIUM LACTATE AND CALCIUM CHLORIDE: 600; 310; 30; 20 INJECTION, SOLUTION INTRAVENOUS at 22:04

## 2023-08-15 RX ADMIN — SODIUM CHLORIDE 500 ML: 9 INJECTION, SOLUTION INTRAVENOUS at 10:43

## 2023-08-15 ASSESSMENT — PAIN - FUNCTIONAL ASSESSMENT: PAIN_FUNCTIONAL_ASSESSMENT: NONE - DENIES PAIN

## 2023-08-15 ASSESSMENT — ENCOUNTER SYMPTOMS
NAUSEA: 1
RECTAL PAIN: 0
COUGH: 0
EYE REDNESS: 0
EYE PAIN: 0
EYES NEGATIVE: 1
SHORTNESS OF BREATH: 0
TROUBLE SWALLOWING: 0
BACK PAIN: 1
DIARRHEA: 0
ABDOMINAL PAIN: 0
ANAL BLEEDING: 0
CHEST TIGHTNESS: 0
SORE THROAT: 0
COLOR CHANGE: 0
CONSTIPATION: 0
ALLERGIC/IMMUNOLOGIC NEGATIVE: 1
BACK PAIN: 0
ABDOMINAL PAIN: 1
APNEA: 1
ABDOMINAL DISTENTION: 1
BLOOD IN STOOL: 0
VOMITING: 0

## 2023-08-15 ASSESSMENT — LIFESTYLE VARIABLES
HOW OFTEN DO YOU HAVE A DRINK CONTAINING ALCOHOL: NEVER
HOW MANY STANDARD DRINKS CONTAINING ALCOHOL DO YOU HAVE ON A TYPICAL DAY: PATIENT DOES NOT DRINK

## 2023-08-15 NOTE — H&P
[facility] Hospitalists      Hospitalist - History & Physical      PCP: Yesenia Eaton MD    Date of Admission: 8/15/2023    Date of Service: 8/15/2023    Chief Complaint:  leg swelling    History Of Present Illness: The patient is a 52 y.o. male with past medical history of type 2 diabetes on insulin therapy, hypertension, hyperlipidemia, chronic back pain, LI with CPAP use, and depression who presented to [facility] ED with complaints of bilateral lower extremity edema and pain. Patient reports approximately 6 weeks ago he began having symptoms of sour stomach, nausea, decreased appetite, and constant feeling of fullness. Patient denies having vomiting or diarrhea. Patient does report having darker than normal stools however has been taking Pepto-Bismol. Patient reports decrease in frequency of bowel movements. Patient denies abdominal pain. Patient reports he has been seen by his PCP and had his medications changed for his acid reflux as he attributed his symptoms to that. Patient reports 2 days ago he woke up with edema to his right leg. Patient reports today he has edema in both legs. Patient reports bilateral lower extremity discomfort as well as difficulty walking. Patient reports he has only been urinating once a day for at least the past 3 days. Patient denies any obvious blood in his urine or difficulty urinating. Patient denies any recent injury. Patient does report decrease in activity recently related to fatigue and bilateral lower extremity edema. In the ED laboratory findings revealed creatinine 2.9 previously 1.1, GFR 25, CK 31, bilirubin 1.3, WBC of 16.2. Venous ultrasound with preliminary findings of extensive DVTs in bilateral lower extremities. Vascular surgery was consulted from the ED and recommended CT abdomen and pelvis. Patient admitted to the hospitalist service for further evaluation.   CT abdomen and pelvis indicates the right kidney is enlarged and there is perinephric fat

## 2023-08-15 NOTE — ED NOTES
Attempted to call report, placed on hold 2 separate times, unable to call report.  Charge RN notified      Merrill Pederson RN  08/15/23 [id number]

## 2023-08-15 NOTE — CONSULTS
Consult Note            Date:8/15/2023        Patient Renato Woodall     YOB: 1970     Age:53 y.o. 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 History Obtained From   patient, electronic medical record     History of Present Illness   Patient 57-year-old male who is a patient of Dr. Tammy Hooks who has been complaining of abdominal pain, epigastric pain poor p.o. intake, early satiety and weight loss approximately 20 pounds over the last month to 6 weeks. He also had complained of right testicular fullness and some swelling and some discomfort without clinical findings of epididymitis. Patient has multiple medical problems including diabetes, hypertension, hyperlipidemia, LI on CPAP. He presented to the emergency department with fairly sudden onset of bilateral lower extremity swelling over the last 2-3 days. Sal Tran He also reported that he had decreased urine output over the last 2 or 3 days. prior to this he was able to empty his bladder without difficulty. He denies any flank pain or hematuria. In the emergency department he is found to have creatinine of 2.9 above his baseline from January 2023 of 1.1. Venous ultrasound showed extensive DVT in bilateral lower extremities. Noncontrasted abdominal CT scan shows a mass in the right kidney with perinephric stranding prominence of the right renal vein and infrahepatic IVC. There is also concern of bland thrombus extending below the renal vein into the IVC and common iliac vein bilaterally and extending into the external iliac and common femoral bilaterally consistent with bland thrombus.   Vascular surgery has been consulted he was
Patient name: Franklin Neil  MRN: [id number]  YOB: 1970    Date of consultation: 8/15/2023    Reason for consultation: Extensive bilateral lower extremity DVT    HPI: Mr. Lisa Gaitan is a very pleasant 47-year-old gentleman who came to the ER with complaints of a 5-week history of significant worsening epigastric pain, with and without eating, and a 20 pound weight loss during that time, as well as a 2 to 3-day history of worsening bilateral lower extremity edema and discomfort. The patient had an appropriate bilateral lower extremity venous duplex, which revealed significant subacute DVT in the bilateral lower extremities from the femoral's to the tibial veins. Based on the imaging, there is suspicion for more proximal thrombosis, possibly IVC obstruction. Patient denied recent long travel, extended bedrest or inactivity, and denied hypercoagulable state. He is a non-smoker. I discussed with Dr. Markus Yadav in the ER that I was concerned about intra-abdominal mass effect in the IVC. She said the patient had a history of colonoscopy in 2022 with polypectomy. Certainly colon cancer was on the differential diagnosis, but also renal cell carcinoma, gastric cancer, metastatic disease, or other etiology. The patient's creatinine was elevated, thus a CT with contrast was not obtained. I suggested we get a noncontrast CT of the abdomen and pelvis and chest x-ray. No obvious pathology on the chest x-ray, but CT of the abdomen and pelvis revealed two interpolar prominence within the right kidney, one measuring 5.5 cm, one measuring 4.4 cm. I have examined the imaging, there is extension of tumor thrombus into the right renal vein with dilation of the vein, tumor thrombus into the IVC and iliac veins, with a small amount that I can identify superior to the renal veins. I cannot tell if this extends into the SVC or the portal vein. Additional imaging will be needed to identify the extent of thrombus extension.   The
identified suggesting edema. Vasculature: There is increased density of the bilateral superficial femoral veins, bilateral common femoral veins, bilateral external iliac veins, bilateral common iliac veins and inferior vena cava to just below the level of the renal veins. Pelvis: The bladder is underdistended and is adjacent fat stranding. Bones: Degenerative changes of the spine are noted. Other:  Anasarca. Impression: Increased density of the bilateral superficial femoral veins, bilateral common femoral veins, bilateral external and internal iliac veins and inferior vena cava suggesting DVT. Right renal interpolar mass-like density measuring approximately 5.4 cm concerning for possible neoplasm. Increased density of the right renal vein raising concern for possible bland or tumor thrombus. Renal mass protocol CT recommended. 4.4 cm right renal indeterminate lesion. Right perinephric fat stranding. Inflammation cannot be excluded. Bladder wall thickening, likely at least in part secondary to distension however there is also fat stranding raising certain possible cystitis. Cholelithiasis. 8/15/2023-chest x-ray-normal exam  8/15/2023-US bilateral lower extremity venous-Evidence of subacute deep vein thrombosis in the bilateral lower  extremity involving the common femoral, profunda femoris, femoral,  popliteal, peroneal, posterior tibial, gastrocnemius, and soleal veins. Superficial thrombophlebitis is seen in the great saphenous vein of the  bilateral lower extremities.      Past Medical History:    Past Medical History:   Diagnosis Date    Anxiety     Depression     Intervertebral lumbar disc disorder with myelopathy, lumbar region     Neuropathy     Obstructive sleep apnea syndrome     Panic attacks     Primary hypertension     Type 2 diabetes mellitus without complication, with long-term current use of insulin ([address])        Past Surgical History:    Past Surgical History:   Procedure Laterality Date

## 2023-08-15 NOTE — TELEPHONE ENCOUNTER
[facility] CONSULT    PATIENT: Krista Burgess    : 1970    DX: extensive bilateral lower dvt    PROVIDER: Dr. Katerine Schultz     Room: [address] [place] [address] Southeast: [facility] [phone]

## 2023-08-16 DIAGNOSIS — E11.9 TYPE 2 DIABETES MELLITUS WITHOUT COMPLICATION, WITH LONG-TERM CURRENT USE OF INSULIN (HCC): ICD-10-CM

## 2023-08-16 DIAGNOSIS — Z79.4 TYPE 2 DIABETES MELLITUS WITHOUT COMPLICATION, WITH LONG-TERM CURRENT USE OF INSULIN (HCC): ICD-10-CM

## 2023-08-16 PROBLEM — I82.403 DEEP VEIN THROMBOSIS (DVT) OF BOTH LOWER EXTREMITIES (HCC): Status: ACTIVE | Noted: 2023-08-16

## 2023-08-16 LAB
ALBUMIN SERPL-MCNC: 2.9 G/DL (ref 3.5–5.2)
ALP SERPL-CCNC: 100 U/L (ref 40–130)
ALT SERPL-CCNC: 25 U/L (ref 5–41)
ANION GAP SERPL CALCULATED.3IONS-SCNC: 15 MMOL/L (ref 7–19)
ANISOCYTOSIS BLD QL SMEAR: ABNORMAL
APTT PPP: 106.8 SEC (ref 26–36.2)
APTT PPP: 46 SEC (ref 26–36.2)
AST SERPL-CCNC: 21 U/L (ref 5–40)
BASOPHILS # BLD: 0.1 K/UL (ref 0–0.2)
BASOPHILS NFR BLD: 0.7 % (ref 0–1)
BILIRUB SERPL-MCNC: 1.1 MG/DL (ref 0.2–1.2)
BUN SERPL-MCNC: 50 MG/DL (ref 6–20)
CALCIUM SERPL-MCNC: 8.5 MG/DL (ref 8.6–10)
CHLORIDE SERPL-SCNC: 97 MMOL/L (ref 98–111)
CO2 SERPL-SCNC: 21 MMOL/L (ref 22–29)
CREAT SERPL-MCNC: 2.7 MG/DL (ref 0.5–1.2)
EKG P AXIS: 0 DEGREES
EKG P-R INTERVAL: 124 MS
EKG Q-T INTERVAL: 366 MS
EKG QRS DURATION: 94 MS
EKG QTC CALCULATION (BAZETT): 413 MS
EKG T AXIS: 60 DEGREES
EOSINOPHIL # BLD: 0.4 K/UL (ref 0–0.6)
EOSINOPHIL NFR BLD: 3.7 % (ref 0–5)
ERYTHROCYTE [DISTWIDTH] IN BLOOD BY AUTOMATED COUNT: 16.9 % (ref 11.5–14.5)
GLUCOSE BLD-MCNC: 116 MG/DL (ref 70–99)
GLUCOSE BLD-MCNC: 166 MG/DL (ref 70–99)
GLUCOSE BLD-MCNC: 187 MG/DL (ref 70–99)
GLUCOSE BLD-MCNC: 200 MG/DL (ref 70–99)
GLUCOSE SERPL-MCNC: 99 MG/DL (ref 74–109)
HCT VFR BLD AUTO: 41.3 % (ref 42–52)
HGB BLD-MCNC: 11.8 G/DL (ref 14–18)
HYPOCHROMIA BLD QL SMEAR: ABNORMAL
IMM GRANULOCYTES # BLD: 0.1 K/UL
LYMPHOCYTES # BLD: 1.4 K/UL (ref 1.1–4.5)
LYMPHOCYTES NFR BLD: 14.3 % (ref 20–40)
MCH RBC QN AUTO: 24.8 PG (ref 27–31)
MCHC RBC AUTO-ENTMCNC: 28.6 G/DL (ref 33–37)
MCV RBC AUTO: 86.8 FL (ref 80–94)
MONOCYTES # BLD: 0.9 K/UL (ref 0–0.9)
MONOCYTES NFR BLD: 9.4 % (ref 0–10)
NEUTROPHILS # BLD: 7.2 K/UL (ref 1.5–7.5)
NEUTS SEG NFR BLD: 71.2 % (ref 50–65)
PERFORMED ON: ABNORMAL
PHOSPHATE SERPL-MCNC: 4.9 MG/DL (ref 2.5–4.5)
PLATELET # BLD AUTO: 198 K/UL (ref 130–400)
PMV BLD AUTO: 10.4 FL (ref 9.4–12.4)
POLYCHROMASIA BLD QL SMEAR: ABNORMAL
POTASSIUM SERPL-SCNC: 3.8 MMOL/L (ref 3.5–5)
PROT SERPL-MCNC: 6.7 G/DL (ref 6.6–8.7)
RBC # BLD AUTO: 4.76 M/UL (ref 4.7–6.1)
SODIUM SERPL-SCNC: 133 MMOL/L (ref 136–145)
WBC # BLD AUTO: 10 K/UL (ref 4.8–10.8)

## 2023-08-16 PROCEDURE — 93010 ELECTROCARDIOGRAM REPORT: CPT | Performed by: INTERNAL MEDICINE

## 2023-08-16 PROCEDURE — 2580000003 HC RX 258: Performed by: NURSE PRACTITIONER

## 2023-08-16 PROCEDURE — 1210000000 HC MED SURG R&B

## 2023-08-16 PROCEDURE — 82962 GLUCOSE BLOOD TEST: CPT

## 2023-08-16 PROCEDURE — 85730 THROMBOPLASTIN TIME PARTIAL: CPT

## 2023-08-16 PROCEDURE — 6370000000 HC RX 637 (ALT 250 FOR IP): Performed by: STUDENT IN AN ORGANIZED HEALTH CARE EDUCATION/TRAINING PROGRAM

## 2023-08-16 PROCEDURE — 6360000002 HC RX W HCPCS: Performed by: NURSE PRACTITIONER

## 2023-08-16 PROCEDURE — 99232 SBSQ HOSP IP/OBS MODERATE 35: CPT | Performed by: INTERNAL MEDICINE

## 2023-08-16 PROCEDURE — 6370000000 HC RX 637 (ALT 250 FOR IP): Performed by: NURSE PRACTITIONER

## 2023-08-16 PROCEDURE — 85025 COMPLETE CBC W/AUTO DIFF WBC: CPT

## 2023-08-16 PROCEDURE — 80053 COMPREHEN METABOLIC PANEL: CPT

## 2023-08-16 PROCEDURE — 94760 N-INVAS EAR/PLS OXIMETRY 1: CPT

## 2023-08-16 PROCEDURE — 99221 1ST HOSP IP/OBS SF/LOW 40: CPT | Performed by: SURGERY

## 2023-08-16 PROCEDURE — 99232 SBSQ HOSP IP/OBS MODERATE 35: CPT | Performed by: UROLOGY

## 2023-08-16 PROCEDURE — 36415 COLL VENOUS BLD VENIPUNCTURE: CPT

## 2023-08-16 PROCEDURE — 84100 ASSAY OF PHOSPHORUS: CPT

## 2023-08-16 RX ORDER — ENOXAPARIN SODIUM 150 MG/ML
1 INJECTION SUBCUTANEOUS 2 TIMES DAILY
Status: DISCONTINUED | OUTPATIENT
Start: 2023-08-16 | End: 2023-08-17 | Stop reason: HOSPADM

## 2023-08-16 RX ADMIN — HEPARIN SODIUM 19 UNITS/KG/HR: 10000 INJECTION, SOLUTION INTRAVENOUS at 01:09

## 2023-08-16 RX ADMIN — SODIUM CHLORIDE, PRESERVATIVE FREE 10 ML: 5 INJECTION INTRAVENOUS at 09:01

## 2023-08-16 RX ADMIN — PREGABALIN 75 MG: 25 CAPSULE ORAL at 20:59

## 2023-08-16 RX ADMIN — HEPARIN SODIUM 18 UNITS/KG/HR: 10000 INJECTION, SOLUTION INTRAVENOUS at 07:22

## 2023-08-16 RX ADMIN — ENOXAPARIN SODIUM 120 MG: 150 INJECTION SUBCUTANEOUS at 20:59

## 2023-08-16 RX ADMIN — PREGABALIN 75 MG: 25 CAPSULE ORAL at 08:44

## 2023-08-16 RX ADMIN — ATORVASTATIN CALCIUM 80 MG: 80 TABLET, FILM COATED ORAL at 08:44

## 2023-08-16 RX ADMIN — HEPARIN SODIUM 9820 UNITS: 1000 INJECTION, SOLUTION INTRAVENOUS; SUBCUTANEOUS at 01:05

## 2023-08-16 RX ADMIN — CEFTRIAXONE 1000 MG: 1 INJECTION, POWDER, FOR SOLUTION INTRAMUSCULAR; INTRAVENOUS at 08:43

## 2023-08-16 RX ADMIN — SODIUM CHLORIDE, POTASSIUM CHLORIDE, SODIUM LACTATE AND CALCIUM CHLORIDE: 600; 310; 30; 20 INJECTION, SOLUTION INTRAVENOUS at 14:49

## 2023-08-16 RX ADMIN — ENOXAPARIN SODIUM 120 MG: 150 INJECTION SUBCUTANEOUS at 09:01

## 2023-08-16 RX ADMIN — TRAZODONE HYDROCHLORIDE 50 MG: 50 TABLET ORAL at 20:59

## 2023-08-16 RX ADMIN — SODIUM CHLORIDE, PRESERVATIVE FREE 10 ML: 5 INJECTION INTRAVENOUS at 21:00

## 2023-08-16 ASSESSMENT — ENCOUNTER SYMPTOMS
TROUBLE SWALLOWING: 0
ANAL BLEEDING: 0
ABDOMINAL DISTENTION: 1
COLOR CHANGE: 0
VOMITING: 0
ABDOMINAL PAIN: 0
BACK PAIN: 1
DIARRHEA: 0
RECTAL PAIN: 0
BLOOD IN STOOL: 0
CONSTIPATION: 0
SHORTNESS OF BREATH: 0
SORE THROAT: 0
CHEST TIGHTNESS: 0
NAUSEA: 1

## 2023-08-16 NOTE — TELEPHONE ENCOUNTER
Charlee Avalos called to request a refill on his medication.       Last office visit : 8/9/2023   Next office visit : Visit date not found     Requested Prescriptions     Pending Prescriptions Disp Refills    insulin 70-30 (HUMULIN 70/30) (70-30) 100 UNIT per ML injection vial [Pharmacy Med Name: HumuLIN 70/30 (70-30) 100 UNIT/ML Subcutaneous Suspension] 60 mL 0     Sig: INJECT 100 UNITS SUBCUTANEOUSLY TWICE DAILY            [place], [place]

## 2023-08-17 VITALS
WEIGHT: 276.4 LBS | BODY MASS INDEX: 37.44 KG/M2 | SYSTOLIC BLOOD PRESSURE: 104 MMHG | TEMPERATURE: 100.4 F | HEART RATE: 103 BPM | RESPIRATION RATE: 18 BRPM | OXYGEN SATURATION: 91 % | DIASTOLIC BLOOD PRESSURE: 71 MMHG | HEIGHT: 72 IN

## 2023-08-17 LAB
ALBUMIN SERPL-MCNC: 3 G/DL (ref 3.5–5.2)
ALP SERPL-CCNC: 109 U/L (ref 40–130)
ALT SERPL-CCNC: 24 U/L (ref 5–41)
ANION GAP SERPL CALCULATED.3IONS-SCNC: 17 MMOL/L (ref 7–19)
AST SERPL-CCNC: 17 U/L (ref 5–40)
BASOPHILS # BLD: 0.1 K/UL (ref 0–0.2)
BASOPHILS NFR BLD: 0.7 % (ref 0–1)
BILIRUB SERPL-MCNC: 1.3 MG/DL (ref 0.2–1.2)
BUN SERPL-MCNC: 46 MG/DL (ref 6–20)
CALCIUM SERPL-MCNC: 8.4 MG/DL (ref 8.6–10)
CHLORIDE SERPL-SCNC: 96 MMOL/L (ref 98–111)
CO2 SERPL-SCNC: 21 MMOL/L (ref 22–29)
CREAT SERPL-MCNC: 2 MG/DL (ref 0.5–1.2)
EOSINOPHIL # BLD: 0.4 K/UL (ref 0–0.6)
EOSINOPHIL NFR BLD: 3.8 % (ref 0–5)
ERYTHROCYTE [DISTWIDTH] IN BLOOD BY AUTOMATED COUNT: 16.7 % (ref 11.5–14.5)
GLUCOSE BLD-MCNC: 263 MG/DL (ref 70–99)
GLUCOSE SERPL-MCNC: 200 MG/DL (ref 74–109)
HCT VFR BLD AUTO: 40.2 % (ref 42–52)
HGB BLD-MCNC: 12.3 G/DL (ref 14–18)
IMM GRANULOCYTES # BLD: 0.1 K/UL
LYMPHOCYTES # BLD: 1.2 K/UL (ref 1.1–4.5)
LYMPHOCYTES NFR BLD: 11.8 % (ref 20–40)
MCH RBC QN AUTO: 24.6 PG (ref 27–31)
MCHC RBC AUTO-ENTMCNC: 30.6 G/DL (ref 33–37)
MCV RBC AUTO: 80.6 FL (ref 80–94)
MONOCYTES # BLD: 1 K/UL (ref 0–0.9)
MONOCYTES NFR BLD: 9.3 % (ref 0–10)
NEUTROPHILS # BLD: 7.7 K/UL (ref 1.5–7.5)
NEUTS SEG NFR BLD: 73.3 % (ref 50–65)
PERFORMED ON: ABNORMAL
PLATELET # BLD AUTO: 247 K/UL (ref 130–400)
PMV BLD AUTO: 10.7 FL (ref 9.4–12.4)
POTASSIUM SERPL-SCNC: 4.2 MMOL/L (ref 3.5–5)
PROT SERPL-MCNC: 6.1 G/DL (ref 6.6–8.7)
RBC # BLD AUTO: 4.99 M/UL (ref 4.7–6.1)
SODIUM SERPL-SCNC: 134 MMOL/L (ref 136–145)
WBC # BLD AUTO: 10.5 K/UL (ref 4.8–10.8)

## 2023-08-17 PROCEDURE — 80053 COMPREHEN METABOLIC PANEL: CPT

## 2023-08-17 PROCEDURE — 6370000000 HC RX 637 (ALT 250 FOR IP): Performed by: NURSE PRACTITIONER

## 2023-08-17 PROCEDURE — 36415 COLL VENOUS BLD VENIPUNCTURE: CPT

## 2023-08-17 PROCEDURE — 85025 COMPLETE CBC W/AUTO DIFF WBC: CPT

## 2023-08-17 PROCEDURE — 99232 SBSQ HOSP IP/OBS MODERATE 35: CPT | Performed by: INTERNAL MEDICINE

## 2023-08-17 PROCEDURE — 94760 N-INVAS EAR/PLS OXIMETRY 1: CPT

## 2023-08-17 PROCEDURE — 6360000002 HC RX W HCPCS: Performed by: NURSE PRACTITIONER

## 2023-08-17 PROCEDURE — 2580000003 HC RX 258: Performed by: NURSE PRACTITIONER

## 2023-08-17 PROCEDURE — 82962 GLUCOSE BLOOD TEST: CPT

## 2023-08-17 RX ORDER — BISACODYL 10 MG
10 SUPPOSITORY, RECTAL RECTAL DAILY PRN
Status: DISCONTINUED | OUTPATIENT
Start: 2023-08-17 | End: 2023-08-17 | Stop reason: HOSPADM

## 2023-08-17 RX ORDER — ENOXAPARIN SODIUM 150 MG/ML
1 INJECTION SUBCUTANEOUS 2 TIMES DAILY
Refills: 3 | COMMUNITY
Start: 2023-08-17

## 2023-08-17 RX ADMIN — PREGABALIN 75 MG: 25 CAPSULE ORAL at 08:43

## 2023-08-17 RX ADMIN — ATORVASTATIN CALCIUM 80 MG: 80 TABLET, FILM COATED ORAL at 08:43

## 2023-08-17 RX ADMIN — PANTOPRAZOLE SODIUM 40 MG: 40 TABLET, DELAYED RELEASE ORAL at 06:15

## 2023-08-17 RX ADMIN — SODIUM CHLORIDE, PRESERVATIVE FREE 10 ML: 5 INJECTION INTRAVENOUS at 08:44

## 2023-08-17 RX ADMIN — INSULIN LISPRO 2 UNITS: 100 INJECTION, SOLUTION INTRAVENOUS; SUBCUTANEOUS at 08:44

## 2023-08-17 RX ADMIN — CEFTRIAXONE 1000 MG: 1 INJECTION, POWDER, FOR SOLUTION INTRAMUSCULAR; INTRAVENOUS at 08:44

## 2023-08-17 RX ADMIN — ENOXAPARIN SODIUM 120 MG: 150 INJECTION SUBCUTANEOUS at 08:43

## 2023-08-20 LAB
BACTERIA BLD CULT ORG #2: NORMAL
BACTERIA BLD CULT: NORMAL

## 2023-08-20 ASSESSMENT — ENCOUNTER SYMPTOMS
EYE PAIN: 0
RHINORRHEA: 0
SHORTNESS OF BREATH: 0
CONSTIPATION: 0
NAUSEA: 0
SORE THROAT: 0
ABDOMINAL PAIN: 1
VOMITING: 0
COUGH: 0
DIARRHEA: 0
EYE DISCHARGE: 0
BACK PAIN: 1
WHEEZING: 0

## 2023-08-28 ENCOUNTER — TELEPHONE (OUTPATIENT)
Dept: FAMILY MEDICINE CLINIC | Age: 53
End: 2023-08-28

## 2023-08-28 NOTE — TELEPHONE ENCOUNTER
Received message from Tracy at [facility].  Stated wanted to know of nephrologist here for patient to be referred. Left message for her to call back.   Her number [phone]

## 2023-08-31 ENCOUNTER — HOSPITAL ENCOUNTER (INPATIENT)
Age: 53
LOS: 8 days | Discharge: HOME | End: 2023-09-08
Attending: PSYCHIATRY & NEUROLOGY | Admitting: PSYCHIATRY & NEUROLOGY
Payer: COMMERCIAL

## 2023-08-31 DIAGNOSIS — C64.9 RENAL CELL CARCINOMA OF KIDNEY EXCLUDING RENAL PELVIS (HCC): Primary | ICD-10-CM

## 2023-08-31 LAB
GLUCOSE BLD-MCNC: 131 MG/DL (ref 70–99)
GLUCOSE BLD-MCNC: 159 MG/DL (ref 70–99)
PERFORMED ON: ABNORMAL
PERFORMED ON: ABNORMAL

## 2023-08-31 PROCEDURE — 82962 GLUCOSE BLOOD TEST: CPT

## 2023-08-31 PROCEDURE — 94150 VITAL CAPACITY TEST: CPT

## 2023-08-31 PROCEDURE — 6370000000 HC RX 637 (ALT 250 FOR IP): Performed by: PSYCHIATRY & NEUROLOGY

## 2023-08-31 PROCEDURE — 1180000000 HC REHAB R&B

## 2023-08-31 PROCEDURE — 94760 N-INVAS EAR/PLS OXIMETRY 1: CPT

## 2023-08-31 RX ORDER — SIMETHICONE 80 MG
80 TABLET,CHEWABLE ORAL 3 TIMES DAILY PRN
Status: DISCONTINUED | OUTPATIENT
Start: 2023-08-31 | End: 2023-09-08 | Stop reason: HOSPADM

## 2023-08-31 RX ORDER — INSULIN LISPRO 100 [IU]/ML
8 INJECTION, SOLUTION INTRAVENOUS; SUBCUTANEOUS
COMMUNITY

## 2023-08-31 RX ORDER — INSULIN GLARGINE 100 [IU]/ML
20 INJECTION, SOLUTION SUBCUTANEOUS NIGHTLY
COMMUNITY

## 2023-08-31 RX ORDER — OXYCODONE HYDROCHLORIDE 5 MG/1
5 TABLET ORAL EVERY 4 HOURS PRN
Status: DISCONTINUED | OUTPATIENT
Start: 2023-08-31 | End: 2023-09-08 | Stop reason: HOSPADM

## 2023-08-31 RX ORDER — INSULIN LISPRO 100 [IU]/ML
1-4 INJECTION, SOLUTION INTRAVENOUS; SUBCUTANEOUS NIGHTLY
Status: DISCONTINUED | OUTPATIENT
Start: 2023-08-31 | End: 2023-09-08 | Stop reason: HOSPADM

## 2023-08-31 RX ORDER — OXYCODONE HYDROCHLORIDE 5 MG/1
5 CAPSULE ORAL EVERY 4 HOURS PRN
Status: ON HOLD | COMMUNITY
End: 2023-09-08 | Stop reason: HOSPADM

## 2023-08-31 RX ORDER — ACETAMINOPHEN 325 MG/1
650 TABLET ORAL EVERY 4 HOURS PRN
Status: DISCONTINUED | OUTPATIENT
Start: 2023-08-31 | End: 2023-09-08 | Stop reason: HOSPADM

## 2023-08-31 RX ORDER — ACETAMINOPHEN 500 MG
1000 TABLET ORAL EVERY 6 HOURS
Status: DISCONTINUED | OUTPATIENT
Start: 2023-08-31 | End: 2023-09-03

## 2023-08-31 RX ORDER — INSULIN LISPRO 100 [IU]/ML
1-4 INJECTION, SOLUTION INTRAVENOUS; SUBCUTANEOUS NIGHTLY
Status: ON HOLD | COMMUNITY
End: 2023-09-08 | Stop reason: HOSPADM

## 2023-08-31 RX ORDER — BACLOFEN 10 MG/1
5 TABLET ORAL 3 TIMES DAILY PRN
Status: DISCONTINUED | OUTPATIENT
Start: 2023-08-31 | End: 2023-09-08 | Stop reason: HOSPADM

## 2023-08-31 RX ORDER — INSULIN LISPRO 100 [IU]/ML
1-4 INJECTION, SOLUTION INTRAVENOUS; SUBCUTANEOUS
Status: DISCONTINUED | OUTPATIENT
Start: 2023-09-01 | End: 2023-09-08 | Stop reason: HOSPADM

## 2023-08-31 RX ORDER — TRAZODONE HYDROCHLORIDE 50 MG/1
50 TABLET ORAL NIGHTLY
Status: DISCONTINUED | OUTPATIENT
Start: 2023-08-31 | End: 2023-09-08 | Stop reason: HOSPADM

## 2023-08-31 RX ORDER — BISACODYL 10 MG
10 SUPPOSITORY, RECTAL RECTAL DAILY PRN
Status: DISCONTINUED | OUTPATIENT
Start: 2023-08-31 | End: 2023-09-08 | Stop reason: HOSPADM

## 2023-08-31 RX ORDER — OXYCODONE HYDROCHLORIDE 5 MG/1
5 CAPSULE ORAL EVERY 4 HOURS PRN
Status: ON HOLD | COMMUNITY
End: 2023-08-31

## 2023-08-31 RX ORDER — INSULIN GLARGINE 100 [IU]/ML
20 INJECTION, SOLUTION SUBCUTANEOUS NIGHTLY
Status: DISCONTINUED | OUTPATIENT
Start: 2023-08-31 | End: 2023-09-08 | Stop reason: HOSPADM

## 2023-08-31 RX ORDER — INSULIN LISPRO 100 [IU]/ML
1-4 INJECTION, SOLUTION INTRAVENOUS; SUBCUTANEOUS
Status: ON HOLD | COMMUNITY
End: 2023-09-08 | Stop reason: HOSPADM

## 2023-08-31 RX ORDER — M-VIT,TX,IRON,MINS/CALC/FOLIC 27MG-0.4MG
1 TABLET ORAL DAILY
Status: DISCONTINUED | OUTPATIENT
Start: 2023-09-01 | End: 2023-09-08 | Stop reason: HOSPADM

## 2023-08-31 RX ORDER — ACETAMINOPHEN 500 MG
1000 TABLET ORAL EVERY 6 HOURS
Status: ON HOLD | COMMUNITY
End: 2023-09-08 | Stop reason: HOSPADM

## 2023-08-31 RX ORDER — SIMETHICONE 80 MG
80 TABLET,CHEWABLE ORAL 3 TIMES DAILY PRN
COMMUNITY

## 2023-08-31 RX ORDER — PANTOPRAZOLE SODIUM 40 MG/1
40 TABLET, DELAYED RELEASE ORAL DAILY
Status: ON HOLD | COMMUNITY
Start: 2023-09-01 | End: 2023-09-08 | Stop reason: HOSPADM

## 2023-08-31 RX ORDER — BACLOFEN 10 MG/1
5 TABLET ORAL 3 TIMES DAILY PRN
Status: ON HOLD | COMMUNITY
End: 2023-09-08 | Stop reason: HOSPADM

## 2023-08-31 RX ORDER — PREGABALIN 50 MG/1
150 CAPSULE ORAL 2 TIMES DAILY
Status: DISCONTINUED | OUTPATIENT
Start: 2023-08-31 | End: 2023-09-06

## 2023-08-31 RX ORDER — PANTOPRAZOLE SODIUM 40 MG/1
40 TABLET, DELAYED RELEASE ORAL DAILY
Status: DISCONTINUED | OUTPATIENT
Start: 2023-09-01 | End: 2023-09-08 | Stop reason: HOSPADM

## 2023-08-31 RX ORDER — SENNA AND DOCUSATE SODIUM 50; 8.6 MG/1; MG/1
1 TABLET, FILM COATED ORAL 2 TIMES DAILY
Status: DISCONTINUED | OUTPATIENT
Start: 2023-08-31 | End: 2023-09-08 | Stop reason: HOSPADM

## 2023-08-31 RX ORDER — TRAZODONE HYDROCHLORIDE 50 MG/1
50 TABLET ORAL NIGHTLY
COMMUNITY

## 2023-08-31 RX ORDER — INSULIN LISPRO 100 [IU]/ML
8 INJECTION, SOLUTION INTRAVENOUS; SUBCUTANEOUS
Status: DISCONTINUED | OUTPATIENT
Start: 2023-09-01 | End: 2023-09-08 | Stop reason: HOSPADM

## 2023-08-31 RX ADMIN — ACETAMINOPHEN 1000 MG: 500 TABLET ORAL at 21:35

## 2023-08-31 RX ADMIN — TRAZODONE HYDROCHLORIDE 50 MG: 50 TABLET ORAL at 21:37

## 2023-08-31 RX ADMIN — INSULIN GLARGINE 20 UNITS: 100 INJECTION, SOLUTION SUBCUTANEOUS at 21:37

## 2023-08-31 RX ADMIN — OXYCODONE HYDROCHLORIDE 5 MG: 5 TABLET ORAL at 21:36

## 2023-08-31 RX ADMIN — PREGABALIN 150 MG: 50 CAPSULE ORAL at 21:35

## 2023-08-31 RX ADMIN — SENNOSIDES AND DOCUSATE SODIUM 1 TABLET: 50; 8.6 TABLET ORAL at 21:37

## 2023-08-31 ASSESSMENT — PAIN DESCRIPTION - DESCRIPTORS: DESCRIPTORS: ACHING;DISCOMFORT;TIGHTNESS

## 2023-08-31 ASSESSMENT — PAIN DESCRIPTION - LOCATION: LOCATION: LEG;SCROTUM

## 2023-08-31 ASSESSMENT — PAIN SCALES - GENERAL
PAINLEVEL_OUTOF10: 0
PAINLEVEL_OUTOF10: 2
PAINLEVEL_OUTOF10: 4

## 2023-08-31 ASSESSMENT — PAIN - FUNCTIONAL ASSESSMENT: PAIN_FUNCTIONAL_ASSESSMENT: ACTIVITIES ARE NOT PREVENTED

## 2023-08-31 ASSESSMENT — PAIN DESCRIPTION - ORIENTATION: ORIENTATION: RIGHT;LEFT

## 2023-09-01 LAB
25(OH)D3 SERPL-MCNC: 16.9 NG/ML
ANION GAP SERPL CALCULATED.3IONS-SCNC: 7 MMOL/L (ref 7–19)
BASOPHILS # BLD: 0 K/UL (ref 0–0.2)
BASOPHILS NFR BLD: 0.6 % (ref 0–1)
BUN SERPL-MCNC: 9 MG/DL (ref 6–20)
CALCIUM SERPL-MCNC: 7.9 MG/DL (ref 8.6–10)
CHLORIDE SERPL-SCNC: 103 MMOL/L (ref 98–111)
CO2 SERPL-SCNC: 28 MMOL/L (ref 22–29)
CREAT SERPL-MCNC: 1.2 MG/DL (ref 0.5–1.2)
CREAT UR-MCNC: 36.5 MG/DL (ref 39–259)
EOSINOPHIL # BLD: 0.4 K/UL (ref 0–0.6)
EOSINOPHIL NFR BLD: 6.8 % (ref 0–5)
ERYTHROCYTE [DISTWIDTH] IN BLOOD BY AUTOMATED COUNT: 18.5 % (ref 11.5–14.5)
FERRITIN SERPL-MCNC: 682.4 NG/ML (ref 30–400)
GLUCOSE BLD-MCNC: 148 MG/DL (ref 70–99)
GLUCOSE BLD-MCNC: 148 MG/DL (ref 70–99)
GLUCOSE BLD-MCNC: 79 MG/DL (ref 70–99)
GLUCOSE BLD-MCNC: 97 MG/DL (ref 70–99)
GLUCOSE SERPL-MCNC: 108 MG/DL (ref 74–109)
HCT VFR BLD AUTO: 33.7 % (ref 42–52)
HGB BLD-MCNC: 9.9 G/DL (ref 14–18)
IMM GRANULOCYTES # BLD: 0.1 K/UL
IRON SATN MFR SERPL: 18 % (ref 14–50)
IRON SERPL-MCNC: 36 UG/DL (ref 59–158)
LYMPHOCYTES # BLD: 1.1 K/UL (ref 1.1–4.5)
LYMPHOCYTES NFR BLD: 17.7 % (ref 20–40)
MCH RBC QN AUTO: 25.1 PG (ref 27–31)
MCHC RBC AUTO-ENTMCNC: 29.4 G/DL (ref 33–37)
MCV RBC AUTO: 85.3 FL (ref 80–94)
MICROALBUMIN UR-MCNC: <1.2 MG/DL (ref 0–19)
MICROALBUMIN/CREAT UR-RTO: ABNORMAL MG/G
MONOCYTES # BLD: 0.5 K/UL (ref 0–0.9)
MONOCYTES NFR BLD: 7.9 % (ref 0–10)
NEUTROPHILS # BLD: 4.1 K/UL (ref 1.5–7.5)
NEUTS SEG NFR BLD: 66 % (ref 50–65)
PERFORMED ON: ABNORMAL
PERFORMED ON: ABNORMAL
PERFORMED ON: NORMAL
PERFORMED ON: NORMAL
PLATELET # BLD AUTO: 240 K/UL (ref 130–400)
PMV BLD AUTO: 11.2 FL (ref 9.4–12.4)
POTASSIUM SERPL-SCNC: 4 MMOL/L (ref 3.5–5)
PREALB SERPL-MCNC: 14 MG/DL (ref 20–40)
RBC # BLD AUTO: 3.95 M/UL (ref 4.7–6.1)
SODIUM SERPL-SCNC: 138 MMOL/L (ref 136–145)
SODIUM UR-SCNC: 37 MMOL/L
TIBC SERPL-MCNC: 199 UG/DL (ref 250–400)
WBC # BLD AUTO: 6.2 K/UL (ref 4.8–10.8)

## 2023-09-01 PROCEDURE — 84300 ASSAY OF URINE SODIUM: CPT

## 2023-09-01 PROCEDURE — 80048 BASIC METABOLIC PNL TOTAL CA: CPT

## 2023-09-01 PROCEDURE — 97535 SELF CARE MNGMENT TRAINING: CPT

## 2023-09-01 PROCEDURE — 83550 IRON BINDING TEST: CPT

## 2023-09-01 PROCEDURE — 6370000000 HC RX 637 (ALT 250 FOR IP): Performed by: PSYCHIATRY & NEUROLOGY

## 2023-09-01 PROCEDURE — 2500000003 HC RX 250 WO HCPCS: Performed by: INTERNAL MEDICINE

## 2023-09-01 PROCEDURE — 84134 ASSAY OF PREALBUMIN: CPT

## 2023-09-01 PROCEDURE — 1180000000 HC REHAB R&B

## 2023-09-01 PROCEDURE — 97165 OT EVAL LOW COMPLEX 30 MIN: CPT

## 2023-09-01 PROCEDURE — 82728 ASSAY OF FERRITIN: CPT

## 2023-09-01 PROCEDURE — 82306 VITAMIN D 25 HYDROXY: CPT

## 2023-09-01 PROCEDURE — 82043 UR ALBUMIN QUANTITATIVE: CPT

## 2023-09-01 PROCEDURE — 97161 PT EVAL LOW COMPLEX 20 MIN: CPT

## 2023-09-01 PROCEDURE — 97116 GAIT TRAINING THERAPY: CPT

## 2023-09-01 PROCEDURE — 36415 COLL VENOUS BLD VENIPUNCTURE: CPT

## 2023-09-01 PROCEDURE — 6370000000 HC RX 637 (ALT 250 FOR IP): Performed by: INTERNAL MEDICINE

## 2023-09-01 PROCEDURE — 83540 ASSAY OF IRON: CPT

## 2023-09-01 PROCEDURE — 82962 GLUCOSE BLOOD TEST: CPT

## 2023-09-01 PROCEDURE — 82570 ASSAY OF URINE CREATININE: CPT

## 2023-09-01 PROCEDURE — 85025 COMPLETE CBC W/AUTO DIFF WBC: CPT

## 2023-09-01 PROCEDURE — 97110 THERAPEUTIC EXERCISES: CPT

## 2023-09-01 PROCEDURE — 94760 N-INVAS EAR/PLS OXIMETRY 1: CPT

## 2023-09-01 PROCEDURE — 99223 1ST HOSP IP/OBS HIGH 75: CPT | Performed by: PSYCHIATRY & NEUROLOGY

## 2023-09-01 PROCEDURE — 97530 THERAPEUTIC ACTIVITIES: CPT

## 2023-09-01 RX ORDER — METOLAZONE 2.5 MG/1
2.5 TABLET ORAL DAILY
Status: DISCONTINUED | OUTPATIENT
Start: 2023-09-01 | End: 2023-09-02

## 2023-09-01 RX ORDER — DEXTROSE MONOHYDRATE 100 MG/ML
INJECTION, SOLUTION INTRAVENOUS CONTINUOUS PRN
Status: CANCELLED | OUTPATIENT
Start: 2023-09-01

## 2023-09-01 RX ORDER — BUMETANIDE 0.25 MG/ML
1 INJECTION INTRAMUSCULAR; INTRAVENOUS 2 TIMES DAILY
Status: DISCONTINUED | OUTPATIENT
Start: 2023-09-01 | End: 2023-09-05

## 2023-09-01 RX ADMIN — INSULIN LISPRO 8 UNITS: 100 INJECTION, SOLUTION INTRAVENOUS; SUBCUTANEOUS at 09:17

## 2023-09-01 RX ADMIN — ACETAMINOPHEN 1000 MG: 500 TABLET ORAL at 14:45

## 2023-09-01 RX ADMIN — INSULIN GLARGINE 20 UNITS: 100 INJECTION, SOLUTION SUBCUTANEOUS at 20:42

## 2023-09-01 RX ADMIN — ACETAMINOPHEN 1000 MG: 500 TABLET ORAL at 20:41

## 2023-09-01 RX ADMIN — MULTIPLE VITAMINS W/ MINERALS TAB 1 TABLET: TAB at 09:17

## 2023-09-01 RX ADMIN — OXYCODONE HYDROCHLORIDE 5 MG: 5 TABLET ORAL at 20:42

## 2023-09-01 RX ADMIN — INSULIN LISPRO 8 UNITS: 100 INJECTION, SOLUTION INTRAVENOUS; SUBCUTANEOUS at 17:25

## 2023-09-01 RX ADMIN — TRAZODONE HYDROCHLORIDE 50 MG: 50 TABLET ORAL at 20:42

## 2023-09-01 RX ADMIN — PREGABALIN 150 MG: 50 CAPSULE ORAL at 20:41

## 2023-09-01 RX ADMIN — APIXABAN 5 MG: 5 TABLET, FILM COATED ORAL at 09:21

## 2023-09-01 RX ADMIN — METOLAZONE 2.5 MG: 2.5 TABLET ORAL at 12:22

## 2023-09-01 RX ADMIN — APIXABAN 5 MG: 5 TABLET, FILM COATED ORAL at 20:42

## 2023-09-01 RX ADMIN — BUMETANIDE 1 MG: 0.25 INJECTION INTRAMUSCULAR; INTRAVENOUS at 18:30

## 2023-09-01 RX ADMIN — PREGABALIN 150 MG: 50 CAPSULE ORAL at 09:15

## 2023-09-01 RX ADMIN — PANTOPRAZOLE SODIUM 40 MG: 40 TABLET, DELAYED RELEASE ORAL at 09:17

## 2023-09-01 RX ADMIN — ACETAMINOPHEN 1000 MG: 500 TABLET ORAL at 09:16

## 2023-09-01 RX ADMIN — SENNOSIDES AND DOCUSATE SODIUM 1 TABLET: 50; 8.6 TABLET ORAL at 20:41

## 2023-09-01 RX ADMIN — SENNOSIDES AND DOCUSATE SODIUM 1 TABLET: 50; 8.6 TABLET ORAL at 09:16

## 2023-09-01 ASSESSMENT — PAIN DESCRIPTION - DESCRIPTORS
DESCRIPTORS: DISCOMFORT
DESCRIPTORS: ACHING;DISCOMFORT
DESCRIPTORS: DISCOMFORT

## 2023-09-01 ASSESSMENT — PAIN - FUNCTIONAL ASSESSMENT
PAIN_FUNCTIONAL_ASSESSMENT: ACTIVITIES ARE NOT PREVENTED

## 2023-09-01 ASSESSMENT — PAIN DESCRIPTION - LOCATION
LOCATION: LEG
LOCATION: LEG;SCROTUM
LOCATION: LEG;SCROTUM

## 2023-09-01 ASSESSMENT — PAIN DESCRIPTION - ORIENTATION
ORIENTATION: RIGHT;LEFT

## 2023-09-01 ASSESSMENT — PAIN SCALES - GENERAL
PAINLEVEL_OUTOF10: 5
PAINLEVEL_OUTOF10: 0
PAINLEVEL_OUTOF10: 2
PAINLEVEL_OUTOF10: 5
PAINLEVEL_OUTOF10: 3
PAINLEVEL_OUTOF10: 2
PAINLEVEL_OUTOF10: 2

## 2023-09-01 NOTE — H&P
[facility]   History and Physical        Patient:   Zack Coker  MR#:    [id number]  Account Number:                   [id number]      Room:    [address] [place] [address]   YOB: 1970  Date of Progress Note: 9/1/2023  Time of Note                           6:35 AM  Attending Physician:  Rayne Shaffer MD        Admitting diagnosis: Right renal mass status post nephrectomy and IVC thrombectomy    Secondary diagnoses:type 2 diabetes on insulin therapy, hypertension, hyperlipidemia, chronic back pain, LI with CPAP use, and depression     CHIEF COMPLAINT: Generalized weakness and deconditioning      HISTORY OF PRESENT ILLNESS:   This 52 y.o. male  ith past medical history of type 2 diabetes on insulin therapy, hypertension, hyperlipidemia, chronic back pain, LI with CPAP use, and depression who presented to [facility] ER on 8/15/23 with c/o bilateral lower extremity edema and pain. Patient reports approximately 6 weeks ago he began having symptoms of sour stomach, nausea, decreased appetite, and constant feeling of fullness. Patient denies having vomiting or diarrhea. Patient does report having darker than normal stools however has been taking Pepto-Bismol. Patient reports decrease in frequency of bowel movements. Patient denies abdominal pain. Patient reports he has been seen by his PCP and had his medications changed for his acid reflux as he attributed his symptoms to that. Patient reports 2 days ago he woke up with edema to his right leg. On presentation to ER he has edema in both legs. Patient reports bilateral lower extremity discomfort as well as difficulty walking. Patient reports he has only been urinating once a day for at least the past 3 days. Patient denies any obvious blood in his urine or difficulty urinating. Patient denies any recent injury. Patient does report decrease in activity recently related to fatigue and bilateral lower extremity edema.  Work-up in ER revealed creatinine 2.9 previously 1.1, assessment  Prior to hospitalization the patient was continent of bowel and bladder    Functional assessment  All notes from reehab data were reviewed regarding the patient's functional status. Current therapy  Requires PT, OT and/or speech therapy    Anticipated discharge approximately 12 days    Anticipated discharge setting  Home with home care    No clear barriers to discharge    The patient appears to be an appropriate candidate for inpatient rehabilitation    Sufficiently stable: Patient's condition is sufficiently stable at the time of admission to allow the patient to actively participate in an intensive rehabilitation program    Close medical supervision: A rehabilitation physician, or other licensed treating physician with specialized training and experience in inpatient rehabilitation, will conduct face-to-face visits with the patient a minimum of at least 3 days per week throughout the patient's stay    This patient requires close medical supervision for the active management of the ongoing conditions and potential complications stated in the admission note    Intensive rehabilitation nursing: The patient demonstrates the need for 24-hour rehabilitation nursing care for active management of the multiple medical issues documented in the admission note    Appropriate therapy needed: The patient requires the active and ongoing therapeutic intervention of at least 2 therapeutic disciplines, one of which must be physical or occupational therapy and/or speech therapy    Intensive therapy: The patient requires and is reasonably expected to actively participate in at least 3 hours of therapy per day at least 5 days per week, and expected to make measurable improvements that will be of practical value to improve the patient's functional capacity or adaptation to impairments.  In addition, therapy treatments will begin within 36 hours from midnight of the day of the patient's admission to the inpatient

## 2023-09-01 NOTE — PROGRESS NOTES
Facility/Department: [facility] 8 REHAB UNIT  Occupational Therapy Initial Assessment    Name: Charleen Forrester  : 1970  MRN: [id number]  Date of Service: 2023    Discharge Recommendations:  Home with assist PRN, Home with Home health OT          Patient Diagnosis(es): There were no encounter diagnoses. Past Medical History:  has a past medical history of Anxiety, Depression, Intervertebral lumbar disc disorder with myelopathy, lumbar region, Neuropathy, Obstructive sleep apnea syndrome, Panic attacks, Primary hypertension, and Type 2 diabetes mellitus without complication, with long-term current use of insulin ([address] Past Surgical History:  has a past surgical history that includes Colonoscopy (N/A, 2022). Treatment Diagnosis: right renalmass with s/p nephrectomy      Assessment   Performance deficits / Impairments: Decreased functional mobility ; Decreased ADL status; Decreased high-level IADLs;Decreased endurance;Decreased strength  Assessment: Pt benefits from continued skilled therapy to address ADLs, functional mobility, strength, endurance, and higher level IADLs for improved independence prior to discharge home.   Treatment Diagnosis: right renalmass with s/p nephrectomy  Prognosis: Good  Decision Making: Low Complexity  History: DM with insulin therapy, HTN, chronic back pain, Depression  Activity Tolerance  Activity Tolerance: Patient Tolerated treatment well            Plan   Occupational Therapy Plan  Specific Instructions for Next Treatment: AE training  Current Treatment Recommendations: Strengthening, Patient/Caregiver education & training, Balance training, Functional mobility training, Equipment evaluation, education, & procurement, Home management training, Endurance training, Safety education & training, Positioning     Restrictions  Restrictions/Precautions  Restrictions/Precautions: Fall Risk  Position Activity Restriction  Other position/activity restrictions: abdominal staples--gait belt to

## 2023-09-01 NOTE — PLAN OF CARE
Problem: Safety - Adult  Goal: Free from fall injury  Outcome: Progressing     Problem: ABCDS Injury Assessment  Goal: Absence of physical injury  Outcome: Progressing     Problem: Skin/Tissue Integrity  Goal: Absence of new skin breakdown  Description: 1. Monitor for areas of redness and/or skin breakdown  2. Assess vascular access sites hourly  3. Every 4-6 hours minimum:  Change oxygen saturation probe site  4. Every 4-6 hours:  If on nasal continuous positive airway pressure, respiratory therapy assess nares and determine need for appliance change or resting period.   Outcome: Progressing     Problem: Discharge Planning  Goal: Discharge to home or other facility with appropriate resources  Outcome: Progressing

## 2023-09-01 NOTE — PROGRESS NOTES
Nutrition Assessment     Type and Reason for Visit: Initial, Positive Nutrition Screen    Nutrition Recommendations/Plan:   Discontinue ONS, monitor oral intake     Malnutrition Assessment:  Malnutrition Status: No malnutrition    Nutrition Assessment:  +NS for pt-reported wt loss and decreased appetite. Pt presents adequately nourished with no recent wt loss per chart review. Will monitor for wt changes. Pt with hx of DM, most recent HgbA1c 5.9%. BG <159 so far. PO intake >75%. Pt does have surgical incision to abdomen. Will follow for PO intake, BG control, and nutritional needs. Nutrition Related Findings:   +3 BLE edema, scrotal edema; Home DM meds: Lantus 20u PM, Humalog 0-4u with meals Wound Type: Surgical Incision (abdomen)    Current Nutrition Therapies:    ADULT DIET;  Regular; 4 carb choices (60 gm/meal)  ADULT ORAL NUTRITION SUPPLEMENT; PM Snack; Diabetic Oral Supplement    Anthropometric Measures:  Height: 6' (182.9 cm)  Current Body Wt: 306 lb 6.4 oz (139 kg)   BMI: 41.5    Nutrition Diagnosis:   Altered nutrition-related lab values related to endocrine dysfuntion as evidenced by lab values    Nutrition Interventions:   Food and/or Nutrient Delivery: Continue Current Diet  Nutrition Education/Counseling: No recommendation at this time  Coordination of Nutrition Care: Continue to monitor while inpatient       Goals:     Goals: PO intake 50% or greater       Nutrition Monitoring and Evaluation:   Behavioral-Environmental Outcomes: None Identified  Food/Nutrient Intake Outcomes: Food and Nutrient Intake  Physical Signs/Symptoms Outcomes: Weight, Biochemical Data, Nutrition Focused Physical Findings    Discharge Planning:    Continue current diet     Hayden Andrew MS, RD, LD, CDCES  Contact: [phone]

## 2023-09-01 NOTE — PROGRESS NOTES
4 Eyes Skin Assessment    Lars Hernandez is being assessed upon: Admission    I agree that I, Ivory Avila RN, along with Tameka Pittman performed a thorough Head to Toe Skin Assessment on the patient. ALL assessment sites listed below have been assessed. Areas assessed by both nurses:     [x]   Head, Face, and Ears   [x]   Shoulders, Back, and Chest  [x]   Arms, Elbows, and Hands   [x]   Coccyx, Sacrum, and Ischium  [x]   Legs, Feet, and Heels    Does the Patient have Skin Breakdown? No    If yes, description of skin breakdown:    Does the Patient have Surgical Incision(s)? Yes, incision(s) noted upon assessment.  It is the responsibility of the Primary Nurse to enure that the incision(s) are documented in the [facility] Prevention initiated: Yes  Wound Care Orders initiated: No    [facility] nurse consulted for Pressure Injury (Stage 3,4, Unstageable, DTI, NWPT, and Complex wounds) and New or Established Ostomies: No        Primary Nurse eSignature: Ivory Avila RN on 8/31/2023 at 11:36 PM      Co-Signer eSignature: Electronically signed by Vera Jara RN on 9/1/23 at 3:44 AM CDT

## 2023-09-01 NOTE — CONSULTS
Nephrology ([facility]) Consult Note      Patient:  Nani Clifton  YOB: 1970  Date of Service: 9/1/2023  MRN: [id number]   Acct: [de-identified]   Primary Care Physician: Suzie Garnica MD  Advance Directive: Full Code  Admit Date: 8/31/2023       Hospital Day: 1  Referring Provider: Cortez Ortiz MD    Patient independently seen and examined, Chart, Consults, Notes, Operative notes, Labs, Cardiology, and Radiology studies reviewed as available. Chief complaint: Abnormal labs/increasing edema. Subjective:  Nani Clifton is a 52 y.o. male for whom we were consulted for evaluation and treatment of increasing edema/anasarca. He was recently hospitalized at [facility] with nausea vomiting and abdominal pain. 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 Urology has recommended to transfer him to tertiary care center. He was accepted to [facility] where he underwent right radical nephrectomy and renal caval thrombectomy. He tolerated procedure very well. Postprocedure he was transferred to [facility] for rehab. However he has noticed increasing edema of lower extremities and scrotal edema. His serum creatinine is now 1.2 mg and nephrology is consulted for the treatment of edema and CKD. Otherwise he has history of type 2 diabetes, insulin-dependent, obesity, obstructive sleep apnea, hypertension and history of panic attack. Allergies:  Patient has no known allergies.     Medicines:  Current Facility-Administered Medications   Medication Dose Route Frequency Provider Last Rate Last Admin    apixaban (ELIQUIS) tablet 5 mg  5 mg Oral BID Cortez Ortiz MD   5 mg at 09/01/23 0921    pregabalin (LYRICA) capsule 150 mg  150 mg Oral BID Cortez Ortiz MD   150 mg at 09/01/23 0915    acetaminophen (TYLENOL) tablet 1,000 mg  1,000 mg Oral Q6H Cortez Ortiz MD   1,000 mg at 09/01/23 0916    baclofen

## 2023-09-01 NOTE — PLAN OF CARE
Problem: Safety - Adult  Goal: Free from fall injury  9/1/2023 1209 by Joycelyn Morris LPN  Outcome: Progressing  8/31/2023 2337 by Cadence Lugo RN  Outcome: Progressing     Problem: ABCDS Injury Assessment  Goal: Absence of physical injury  9/1/2023 1209 by Joycelyn Morris LPN  Outcome: Progressing  8/31/2023 2337 by Cadence Lugo RN  Outcome: Progressing     Problem: Skin/Tissue Integrity  Goal: Absence of new skin breakdown  Description: 1. Monitor for areas of redness and/or skin breakdown  2. Assess vascular access sites hourly  3. Every 4-6 hours minimum:  Change oxygen saturation probe site  4. Every 4-6 hours:  If on nasal continuous positive airway pressure, respiratory therapy assess nares and determine need for appliance change or resting period.   9/1/2023 1209 by Joycelyn Morris LPN  Outcome: Progressing  8/31/2023 2337 by Cadence Lugo RN  Outcome: Progressing     Problem: Discharge Planning  Goal: Discharge to home or other facility with appropriate resources  9/1/2023 1209 by Joycelyn Morris LPN  Outcome: Progressing  Flowsheets (Taken 9/1/2023 0926)  Discharge to home or other facility with appropriate resources: Refer to discharge planning if patient needs post-hospital services based on physician order or complex needs related to functional status, cognitive ability or social support system  8/31/2023 2337 by Cadence Lugo RN  Outcome: Progressing     Problem: Chronic Conditions and Co-morbidities  Goal: Patient's chronic conditions and co-morbidity symptoms are monitored and maintained or improved  Outcome: Progressing  Flowsheets (Taken 9/1/2023 0926)  Care Plan - Patient's Chronic Conditions and Co-Morbidity Symptoms are Monitored and Maintained or Improved: Monitor and assess patient's chronic conditions and comorbid symptoms for stability, deterioration, or improvement     Problem: Neurosensory - Adult  Goal: Achieves stable or improved neurological

## 2023-09-01 NOTE — CONSULTS
[facility] Vascular Access Team:  Consult Note    Patient: Nani Clifton  YOB: 1970   MRN: [id number]  Room: [address] [place] [address]     Attending Physician: Cortez Ortiz MD  Ordering Physician: Cortez Ortiz MD    Diagnosis:   Renal cell carcinoma of kidney excluding renal pelvis [facility]) [C64.9]    Active LDAs:  Peripheral IV 09/01/23 Left;Posterior Forearm (Active)   Number of days: 0       Reason for Consult:  [facility] vascular access team consulted for placement of Ultrasound Guided Peripheral IV. Indication(s):  Nani Clifton is a 52 y.o. male admitted to [facility]- for Renal cell carcinoma of kidney excluding renal pelvis ([address] Nani Clifton is requiring IV medications and IV access not obtained despite multiple attempts. Findings:  A 20 gauge ultrasound guided peripheral IV placed in the left forearm in one attempt. Patient tolerated well. Past Medical History:   Diagnosis Date    Anxiety     Depression     Intervertebral lumbar disc disorder with myelopathy, lumbar region     Neuropathy     Obstructive sleep apnea syndrome     Panic attacks     Primary hypertension     Type 2 diabetes mellitus without complication, with long-term current use of insulin ([facility])        Recent Labs     Units 09/01/23  0634   WBC K/uL 6.2   PLT K/uL 240   CREATININE mg/dL 1.2       Impression/Plan:  1. Ultrasound-Guided Peripheral IV is ready to be used    Thank you for your time and consult.      Electronically Signed By: Stephanie Laguerre RN on 9/1/2023 at 6:53 PM

## 2023-09-01 NOTE — PROGRESS NOTES
09/01/23 1300   General   Chart Reviewed Yes   Patient assessed for rehabilitation services? Yes   Additional Pertinent Hx HYPERTENSION, ACUTE KIDNEY FAILURE, ACUTE EMBOLISM/THROMBOSIS OF LE, LI, OBESITY   Diagnosis 8/22 R RADICAL NEPHRECTOMY AND CAVAL THROMBECTOMY   Subjective   Subjective PAIN FROM EDEMA IN BLE AND SCROTUM   Pain 3/10   Transfers   Sit to Stand Stand by assistance   Stand to Sit Stand by assistance   Ambulation   Surface Level tile   Device Rolling Walker   Assistance Stand by assistance   Quality of Gait WIDE GENEVA D/T EDEMA IN SCROTUM, TOE OUT GAIT, RECIPROCAL PATTERN   Gait Deviations Slow Cherelle; Increased GENEVA; Decreased step length;Decreased step height   Distance 50'   Comments WALKED AROUND THE ROOM/TO BATHROOM X3, PT EDUCATED ON PIVOTING WITH WALKER RATHER THAN PICK IT UP   PT Exercises   Exercise Treatment SUPINE BLE ACTIVITY: HEEL SLIDES 2 X10, HIP ABDUCTIONS ADDUCTION X 10   Assessment   Assessment PT STARTED LE STRENGTHENING ACTIVITIES THIS PM. PT WAS PROVIDED A WIDER RW. PT TOLERATES LARGER RW BETTER.    Safety Devices   Type of Devices Call light within reach   PT Individual Minutes   Time In [address] 304   Minutes 45

## 2023-09-01 NOTE — PROGRESS NOTES
Facility/Department: [facility] 8 REHAB UNIT  Occupational Therapy Treatment Note    Name: Zack Coker  : 1970  MRN: [id number]  Date of Service: 2023    Discharge Recommendations:  Home with assist PRN, Home with Home health OT          Patient Diagnosis(es): There were no encounter diagnoses. Past Medical History:  has a past medical history of Anxiety, Depression, Intervertebral lumbar disc disorder with myelopathy, lumbar region, Neuropathy, Obstructive sleep apnea syndrome, Panic attacks, Primary hypertension, and Type 2 diabetes mellitus without complication, with long-term current use of insulin ([address]). Past Surgical History:  has a past surgical history that includes Colonoscopy (N/A, 2022). Treatment Diagnosis: right renalmass with s/p nephrectomy      Assessment   Performance deficits / Impairments: Decreased functional mobility ; Decreased ADL status; Decreased high-level IADLs;Decreased endurance;Decreased strength  Assessment: Pt benefits from continued skilled therapy to address ADLs, functional mobility, strength, endurance, and higher level IADLs for improved independence prior to discharge home.   Treatment Diagnosis: right renalmass with s/p nephrectomy  Prognosis: Good  Decision Making: Low Complexity  History: DM with insulin therapy, HTN, chronic back pain, Depression  Activity Tolerance  Activity Tolerance: Patient Tolerated treatment well            Plan   Occupational Therapy Plan  Specific Instructions for Next Treatment: AE training  Current Treatment Recommendations: Strengthening, Patient/Caregiver education & training, Balance training, Functional mobility training, Equipment evaluation, education, & procurement, Home management training, Endurance training, Safety education & training, Positioning     Restrictions  Restrictions/Precautions  Restrictions/Precautions: Fall Risk  Position Activity Restriction  Other position/activity restrictions: abdominal staples--gait belt to be

## 2023-09-02 LAB
25(OH)D3 SERPL-MCNC: 14.8 NG/ML
ALBUMIN SERPL-MCNC: 3.2 G/DL (ref 3.5–5.2)
ALP SERPL-CCNC: 75 U/L (ref 40–130)
ALT SERPL-CCNC: 9 U/L (ref 5–41)
ANION GAP SERPL CALCULATED.3IONS-SCNC: 8 MMOL/L (ref 7–19)
AST SERPL-CCNC: 14 U/L (ref 5–40)
BILIRUB SERPL-MCNC: 0.5 MG/DL (ref 0.2–1.2)
BUN SERPL-MCNC: 9 MG/DL (ref 6–20)
CALCIUM SERPL-MCNC: 8 MG/DL (ref 8.6–10)
CHLORIDE SERPL-SCNC: 103 MMOL/L (ref 98–111)
CO2 SERPL-SCNC: 29 MMOL/L (ref 22–29)
CREAT SERPL-MCNC: 1.1 MG/DL (ref 0.5–1.2)
ERYTHROCYTE [DISTWIDTH] IN BLOOD BY AUTOMATED COUNT: 18.6 % (ref 11.5–14.5)
GLUCOSE BLD-MCNC: 120 MG/DL (ref 70–99)
GLUCOSE BLD-MCNC: 179 MG/DL (ref 70–99)
GLUCOSE BLD-MCNC: 217 MG/DL (ref 70–99)
GLUCOSE BLD-MCNC: 228 MG/DL (ref 70–99)
GLUCOSE SERPL-MCNC: 134 MG/DL (ref 74–109)
HCT VFR BLD AUTO: 34.8 % (ref 42–52)
HGB BLD-MCNC: 10.4 G/DL (ref 14–18)
MCH RBC QN AUTO: 25.2 PG (ref 27–31)
MCHC RBC AUTO-ENTMCNC: 29.9 G/DL (ref 33–37)
MCV RBC AUTO: 84.3 FL (ref 80–94)
PERFORMED ON: ABNORMAL
PLATELET # BLD AUTO: 225 K/UL (ref 130–400)
PMV BLD AUTO: 10.9 FL (ref 9.4–12.4)
POTASSIUM SERPL-SCNC: 3.9 MMOL/L (ref 3.5–5)
PROT SERPL-MCNC: 5.8 G/DL (ref 6.6–8.7)
RBC # BLD AUTO: 4.13 M/UL (ref 4.7–6.1)
SODIUM SERPL-SCNC: 140 MMOL/L (ref 136–145)
WBC # BLD AUTO: 5.7 K/UL (ref 4.8–10.8)

## 2023-09-02 PROCEDURE — 6370000000 HC RX 637 (ALT 250 FOR IP): Performed by: INTERNAL MEDICINE

## 2023-09-02 PROCEDURE — 97116 GAIT TRAINING THERAPY: CPT

## 2023-09-02 PROCEDURE — 97110 THERAPEUTIC EXERCISES: CPT

## 2023-09-02 PROCEDURE — 36415 COLL VENOUS BLD VENIPUNCTURE: CPT

## 2023-09-02 PROCEDURE — 97530 THERAPEUTIC ACTIVITIES: CPT

## 2023-09-02 PROCEDURE — 6370000000 HC RX 637 (ALT 250 FOR IP): Performed by: PSYCHIATRY & NEUROLOGY

## 2023-09-02 PROCEDURE — 82962 GLUCOSE BLOOD TEST: CPT

## 2023-09-02 PROCEDURE — 80053 COMPREHEN METABOLIC PANEL: CPT

## 2023-09-02 PROCEDURE — 97535 SELF CARE MNGMENT TRAINING: CPT

## 2023-09-02 PROCEDURE — 1180000000 HC REHAB R&B

## 2023-09-02 PROCEDURE — 82306 VITAMIN D 25 HYDROXY: CPT

## 2023-09-02 PROCEDURE — 85027 COMPLETE CBC AUTOMATED: CPT

## 2023-09-02 PROCEDURE — 2500000003 HC RX 250 WO HCPCS: Performed by: INTERNAL MEDICINE

## 2023-09-02 PROCEDURE — 99232 SBSQ HOSP IP/OBS MODERATE 35: CPT | Performed by: PSYCHIATRY & NEUROLOGY

## 2023-09-02 RX ORDER — METOLAZONE 2.5 MG/1
5 TABLET ORAL DAILY
Status: DISCONTINUED | OUTPATIENT
Start: 2023-09-03 | End: 2023-09-05

## 2023-09-02 RX ADMIN — METOLAZONE 2.5 MG: 2.5 TABLET ORAL at 08:37

## 2023-09-02 RX ADMIN — PANTOPRAZOLE SODIUM 40 MG: 40 TABLET, DELAYED RELEASE ORAL at 08:36

## 2023-09-02 RX ADMIN — TRAZODONE HYDROCHLORIDE 50 MG: 50 TABLET ORAL at 21:25

## 2023-09-02 RX ADMIN — MAGNESIUM HYDROXIDE 30 ML: 400 SUSPENSION ORAL at 14:19

## 2023-09-02 RX ADMIN — SENNOSIDES AND DOCUSATE SODIUM 1 TABLET: 50; 8.6 TABLET ORAL at 21:24

## 2023-09-02 RX ADMIN — BUMETANIDE 1 MG: 0.25 INJECTION INTRAMUSCULAR; INTRAVENOUS at 08:37

## 2023-09-02 RX ADMIN — MULTIPLE VITAMINS W/ MINERALS TAB 1 TABLET: TAB at 08:37

## 2023-09-02 RX ADMIN — ACETAMINOPHEN 1000 MG: 500 TABLET ORAL at 08:37

## 2023-09-02 RX ADMIN — SENNOSIDES AND DOCUSATE SODIUM 1 TABLET: 50; 8.6 TABLET ORAL at 08:36

## 2023-09-02 RX ADMIN — APIXABAN 5 MG: 5 TABLET, FILM COATED ORAL at 21:25

## 2023-09-02 RX ADMIN — PREGABALIN 150 MG: 50 CAPSULE ORAL at 21:24

## 2023-09-02 RX ADMIN — ACETAMINOPHEN 1000 MG: 500 TABLET ORAL at 21:24

## 2023-09-02 RX ADMIN — PREGABALIN 150 MG: 50 CAPSULE ORAL at 08:36

## 2023-09-02 RX ADMIN — BUMETANIDE 1 MG: 0.25 INJECTION INTRAMUSCULAR; INTRAVENOUS at 16:51

## 2023-09-02 RX ADMIN — APIXABAN 5 MG: 5 TABLET, FILM COATED ORAL at 08:36

## 2023-09-02 RX ADMIN — INSULIN GLARGINE 20 UNITS: 100 INJECTION, SOLUTION SUBCUTANEOUS at 21:26

## 2023-09-02 ASSESSMENT — PAIN DESCRIPTION - DESCRIPTORS: DESCRIPTORS: ACHING

## 2023-09-02 ASSESSMENT — PAIN SCALES - GENERAL
PAINLEVEL_OUTOF10: 3
PAINLEVEL_OUTOF10: 0

## 2023-09-02 ASSESSMENT — PAIN DESCRIPTION - LOCATION: LOCATION: SCROTUM;LEG

## 2023-09-02 NOTE — PROGRESS NOTES
Name: Alina Oakes  MRN: [id number]  Date of Service:  9/2/2023 09/02/23 1000   Restrictions/Precautions   Restrictions/Precautions Fall Risk   Position Activity Restriction   Other position/activity restrictions abdominal staples--gait belt to be worn high, severe scrotal edema with compression garment   General   Chart Reviewed Yes   Patient assessed for rehabilitation services? Yes   Additional Pertinent Hx HYPERTENSION, ACUTE KIDNEY FAILURE, ACUTE EMBOLISM/THROMBOSIS OF LE, LI, OBESITY   Diagnosis 8/22 R RADICAL NEPHRECTOMY AND CAVAL THROMBECTOMY   Subjective   Subjective PAIN FROM EDEMA IN BLE AND SCROTUM   Pain Verbal:4/10   Bed mobility   Rolling to Left Stand by assistance   Rolling to Right Stand by assistance   Supine to Sit Minimal assistance   Scooting Contact guard assistance  (On EOB)   Bed Mobility Comments On L side of bed- use of bedrail   Transfers   Sit to Stand Stand by assistance   Stand to Sit Stand by assistance   Ambulation   Surface Level tile   Device Rolling [employer] Stand by assistance   Quality of Gait WIDE GENEVA D/T EDEMA IN SCROTUM, TOE OUT GAIT, RECIPROCAL PATTERN   Gait Deviations Slow Cherelle; Increased GENEVA; Decreased step length;Decreased step height   Distance 15'   Comments 1 R/1 L turn   Activity Tolerance   Activity Tolerance Other (comment); Patient tolerated treatment well  (Tx limited due to time limitations and scotum swelling)   Assessment   Assessment Focus of tx on assisting pt in personal hygiene and dressing- pt able to sit on the EOB and independently wash upper body/don shirt. Pt able to amb. ~15' to w/c in room at end of tx.    Discharge Recommendations Continue to assess pending progress;Home with Home health PT;Home with assist PRN   Safety Devices   Type of Devices Patient at risk for falls;Gait belt;Left in chair  (In OT gym)       Electronically signed by Yuni Smith PTA on 9/2/2023 at 4:34 PM

## 2023-09-02 NOTE — PROGRESS NOTES
Patient:   Petra Lacy  MR#:    [id number]   Room:    9392/530-71   YOB: 1970  Date of Progress Note: 9/2/2023  Time of Note                           9:10 AM  Consulting Physician:   Adan Jacome M.D. Attending Physician:  Adan Jacome MD     CHIEF COMPLAINT: Generalized weakness and deconditioning     Subjective: This 52 y.o. male  ith past medical history of type 2 diabetes on insulin therapy, hypertension, hyperlipidemia, chronic back pain, LI with CPAP use, and depression who presented to [facility] ER on 8/15/23 with c/o bilateral lower extremity edema and pain. Patient reports approximately 6 weeks ago he began having symptoms of sour stomach, nausea, decreased appetite, and constant feeling of fullness. Patient denies having vomiting or diarrhea. Patient does report having darker than normal stools however has been taking Pepto-Bismol. Patient reports decrease in frequency of bowel movements. Patient denies abdominal pain. Patient reports he has been seen by his PCP and had his medications changed for his acid reflux as he attributed his symptoms to that. Patient reports 2 days ago he woke up with edema to his right leg. On presentation to ER he has edema in both legs. Patient reports bilateral lower extremity discomfort as well as difficulty walking. Patient reports he has only been urinating once a day for at least the past 3 days. Patient denies any obvious blood in his urine or difficulty urinating. Patient denies any recent injury. Patient does report decrease in activity recently related to fatigue and bilateral lower extremity edema. Work-up in ER revealed creatinine 2.9 previously 1.1, GFR 25, CK 31, bilirubin 1.3, WBC of 16.2. Venous ultrasound with preliminary findings of extensive DVTs in bilateral lower extremities. Vascular surgery was consulted from the ER and recommended CT abdomen and pelvis. Patient admitted to the hospitalist service for further evaluation.   CT and monitoring  4. GI-bowel regimen  5. Chronic low back pain  6. Fluid overload-Bumex.   Improved  Appreciate nephrology assistance     ELOS: Staff next week

## 2023-09-02 NOTE — PROGRESS NOTES
Occupational Therapy  Facility/Department: [facility] 8 REHAB UNIT  Occupational Therapy Treatment Note    Name: Zack Coker  : 1970  MRN: [id number]  Date of Service: 2023    Discharge Recommendations:  Home with assist PRN, Home with Home health OT          Patient Diagnosis(es): There were no encounter diagnoses. Past Medical History:  has a past medical history of Anxiety, Depression, Intervertebral lumbar disc disorder with myelopathy, lumbar region, Neuropathy, Obstructive sleep apnea syndrome, Panic attacks, Primary hypertension, and Type 2 diabetes mellitus without complication, with long-term current use of insulin ([address]). Past Surgical History:  has a past surgical history that includes Colonoscopy (N/A, 2022). Treatment Diagnosis: right renalmass with s/p nephrectomy      Assessment Pt limiting tx to bed level/bathroom due to wanting to avoid increased LB edema  Performance deficits / Impairments: Decreased functional mobility ; Decreased ADL status; Decreased high-level IADLs;Decreased endurance;Decreased strength  Activity Tolerance  Activity Tolerance: Patient Tolerated treatment well        Plan   Occupational Therapy Plan  Specific Instructions for Next Treatment: AE training  Current Treatment Recommendations: Strengthening, Patient/Caregiver education & training, Balance training, Functional mobility training, Equipment evaluation, education, & procurement, Home management training, Endurance training, Safety education & training, Positioning, Self-Care / ADL     Restrictions  Restrictions/Precautions  Restrictions/Precautions: Fall Risk  Position Activity Restriction  Other position/activity restrictions: abdominal staples--gait belt to be worn high, severe scrotal edema with compression garment    Subjective   General  Chart Reviewed: Yes, Orders  Patient assessed for rehabilitation services?: Yes  Additional Pertinent Hx: DM with insulin therapy, HTN, chronic back pain, Depression  Family / Caregiver Present: No  Diagnosis: right renalmass with s/p nephrectomy  Subjective  Subjective: pt requests staying in bed/avoiding sitting to assist in managing edema     Social/Functional History  Social/Functional History  Lives With: Alone, Other (comment)  Type of Home: House  Home Layout: One level  Home Access: Stairs to enter with rails  Entrance Stairs - Number of Steps: 3  Entrance Stairs - Rails: Right  Bathroom Shower/Tub: Walk-in shower  Bathroom Toilet:  (comfort height)  Home Equipment: Cane  Has the patient had two or more falls in the past year or any fall with injury in the past year?: No  ADL Assistance: Needs assistance  Dressing: Minimal assistance  Ambulation Assistance: Independent (with cane)  Transfer Assistance: Independent  Additional Comments: stated needing occasional assistance for pants--pt very sleepy during session--not sure of reliabilty of responses about prior level of function       Objective   Pulse: 67  BP: 127/71  MAP (Calculated): 90  Respirations: 16  SpO2: 96 %             Safety Devices  Type of Devices: Call light within reach; Left in bed     Toilet Transfers  Toilet - Technique: Ambulating (RW)  Equipment Used: Extra wide bedside commode  Toilet Transfer: Contact guard assistance              Bed mobility  Supine to Sit: Minimal assistance  Sit to Supine: Stand by assistance  Transfers  Sit to stand: Stand by assistance  Stand to sit: Stand by assistance            09/02/23 1300   Balance   Standing Balance Contact guard assistance   Functional Mobility   Functional - Mobility Device Rolling Walker   Activity To/from bathroom   Assist Level Contact guard assistance               09/02/23 1300   Education   Education Given To Patient   Education Provided Comments AE for LB dressing   Education Method Demonstration;Verbal   Barriers to Learning None   Education Outcome Continued education needed;Verbalized understanding                     09/02/23

## 2023-09-02 NOTE — PROGRESS NOTES
Name: Milagros Lanza  MRN: [id number]  Date of Service:  9/2/2023 09/02/23 1345   Assessment   Assessment Pt declines participation in therapy at this time due to discomfort in scrotum.            Electronically signed by Ricky Espinoza PTA on 9/2/2023 at 4:37 PM

## 2023-09-02 NOTE — PROGRESS NOTES
Occupational Therapy  Facility/Department: [facility] 8 REHAB UNIT  Occupational Therapy Treatment Note    Name: Holli Hashimoto  : 1970  MRN: [id number]  Date of Service: 2023    Discharge Recommendations:  Home with assist PRN, Home with Home health OT          Patient Diagnosis(es): There were no encounter diagnoses. Past Medical History:  has a past medical history of Anxiety, Depression, Intervertebral lumbar disc disorder with myelopathy, lumbar region, Neuropathy, Obstructive sleep apnea syndrome, Panic attacks, Primary hypertension, and Type 2 diabetes mellitus without complication, with long-term current use of insulin ([address] Past Surgical History:  has a past surgical history that includes Colonoscopy (N/A, 2022). Treatment Diagnosis: right renalmass with s/p nephrectomy      Assessment   Performance deficits / Impairments: Decreased functional mobility ; Decreased ADL status; Decreased high-level IADLs;Decreased endurance;Decreased strength  Assessment: CGA with functional mobility/transfers with RW. Decreased LB ADL performance due to edema.  Pt tolerates ther ex without complaints but at end of tx pt reports discomfort from sitting too long/edema and reports he would tolerate supine ex's better in future  Treatment Diagnosis: right renalmass with s/p nephrectomy  REQUIRES OT FOLLOW-UP: Yes  Activity Tolerance  Activity Tolerance: Patient Tolerated treatment well        Plan   Occupational Therapy Plan  Specific Instructions for Next Treatment: AE training  Current Treatment Recommendations: Strengthening, Patient/Caregiver education & training, Balance training, Functional mobility training, Equipment evaluation, education, & procurement, Home management training, Endurance training, Safety education & training, Positioning, Self-Care / ADL     Restrictions  Restrictions/Precautions  Restrictions/Precautions: Fall Risk  Position Activity Restriction  Other position/activity restrictions: abdominal

## 2023-09-02 NOTE — CONSULTS
lymphadenopathy  NEUROLOGIC: follows commands, non focal   PSYCH: mood and affect appropriate. Alert and oriented to time, place, person        LABORATORY RESULTS REVIEWED/ANALYZED BY ME:  Recent Labs     09/03/23  0355 09/02/23  0529 09/01/23  0634   WBC 6.7 5.7 6.2   HGB 9.9* 10.4* 9.9*   HCT 32.5* 34.8* 33.7*   MCV 82.5 84.3 85.3    225 240       Lab Results   Component Value Date     09/03/2023    K 4.0 09/03/2023    CL 99 09/03/2023    CO2 31 (H) 09/03/2023    BUN 11 09/03/2023    CREATININE 1.1 09/03/2023    GLUCOSE 156 (H) 09/03/2023    CALCIUM 7.8 (L) 09/03/2023    PROT 5.5 (L) 09/03/2023    LABALBU 3.1 (L) 09/03/2023    BILITOT 0.5 09/03/2023    ALKPHOS 76 09/03/2023    AST 13 09/03/2023    ALT 8 09/03/2023    LABGLOM >60 09/03/2023       RADIOLOGY STUDIES REPORT/REVIEWED AND INTERPRETED BY ME:    8/15/2023-CT abdomen/pelvis without contrast remarkable for: Kidneys: The right kidney is enlarged. There is right perinephric fat stranding. Focal prominence of the interpolar region of the right kidney is identified measuring approximately 5.5 cm. There is an anterior interpolar right renal hypodensity measuring 4.4 cm and 20 HU. There is increase size and density of the right renal vein. Lymph nodes: No abdominal or pelvic lymphadenopathy. Mesentery, peritoneum: No significant ascites. No free air. Retroperitoneum: Retroperitoneal fat stranding is identified suggesting edema. Increased density of the bilateral superficial femoral veins, bilateral common femoral veins, bilateral external and internal iliac veins and inferior vena cava suggesting DVT. 8/16/2023-CT chest without contrast remarkable for: There is no pulmonary nodule or mass. Mediastinum, and Suri: No mass. No lymphadenopathy.   Essentially, no findings of metastatic disease in the chest.   8/15/2023-US bilateral lower extremity remarkable for: Evidence of subacute deep vein thrombosis in the bilateral lower  extremity involving the common femoral, profunda femoris, femoral,  popliteal, peroneal, posterior tibial, gastrocnemius, and soleal veins. Superficial thrombophlebitis is seen in the great saphenous vein of the  bilateral lower extremities. ASSESSMENT:  [facility], Augt 2023 8/22/2023-s/p right nephrectomy with IVC thrombectomy and VC resection by Dr. Saleem Denise @ [facility] [address]. Path: A. Soft tissue, paracaval , resection - One lymph node, negative for malignancy (0/1)   B. Inferior vena cava, resection:- No tumor thrombus identified, Two lymph nodes, negative for malignancy (0/2). C. Kidney, right, nephrectomy: - Clear cell renal cell carcinoma, see comment : Rhabdoid features present (10-15%), Tumor necrosis present (10%),WHO/ISUP grade: 4/4, Tumor size: 6.2 cm in greatest dimension, Tumor extends into renal sinus fat and segments of renal vein, Margin negative for malignancy. Pathological stage: pT3aN0     Intermediate-high risk - pT2 tumors with grade 4 or sarcomatoid features; or pT3, any grade, and node negative tumors. High risk - pT4, any grade, node negative tumors; or any pT, any grade, node positive tumors. Metastatic, no evidence of disease - resection of all oligometastatic sites (M1) with no evidence of disease (LOI) within one year of nephrectomy. -Recommended outpatient consideration of adjuvant pembrolizumab 200 mg IV every 3 weeks x17 cycles (1 year treatment) based on double-blind, placebo-controlled phase III trial ([id number])    Normocytic anemia-likely postoperative anemia  Recent Labs     09/03/23  0355 09/02/23  0529 09/01/23  0634   WBC 6.7 5.7 6.2   HGB 9.9* 10.4* 9.9*   HCT 32.5* 34.8* 33.7*   MCV 82.5 84.3 85.3    225 240   Ferritin 684, iron 36, iron saturation 18%, TIBC 199    Bilateral lower extremity DVT  -Continue Eliquis    PLAN:  Continue Eliquis  Continue current supportive care  Continue physical therapy  Provided education material for patient.      I have seen, examined

## 2023-09-02 NOTE — PROGRESS NOTES
Nephrology ([facility]) Progress Note      Patient:  Jnaette Johns  YOB: 1970  Date of Service: 9/2/2023  MRN: [id number]   Acct: [de-identified]   Primary Care Physician: Abad Morton MD  Advance Directive: Full Code  Admit Date: 8/31/2023       Hospital Day: 2  Referring Provider: Jaki Carter MD    Patient independently seen and examined, Chart, Consults, Notes, Operative notes, Labs, Cardiology, and Radiology studies reviewed as available. Chief complaint: Abnormal labs/increasing edema. Subjective:  Janette Johns is a 52 y.o. male for whom we were consulted for evaluation and treatment of increasing edema/anasarca. He was recently hospitalized at [facility] with nausea vomiting and abdominal pain. 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 Urology has recommended to transfer him to tertiary care center. He was accepted to [facility] where he underwent right radical nephrectomy and renal caval thrombectomy. He tolerated procedure very well. Postprocedure he was transferred to [facility] for rehab. However he has noticed increasing edema of lower extremities and scrotal edema. His serum creatinine is now 1.2 mg and nephrology is consulted for the treatment of edema and CKD. Otherwise he has history of type 2 diabetes, insulin-dependent, obesity, obstructive sleep apnea, hypertension and history of panic attack. Hospital course remarkable for  administration of IV diuretics and he has excellent urine output. This morning patient feels well. He denies any shortness of breath however he still has swelling    Allergies:  Patient has no known allergies.     Medicines:  Current Facility-Administered Medications   Medication Dose Route Frequency Provider Last Rate Last Admin    apixaban (ELIQUIS) tablet 5 mg  5 mg Oral BID Jaki Carter MD   5 mg at 09/02/23 0836    bumetanide ([address]) hours.  CRP:  No results for input(s): CRP in the last 72 hours. Sed Rate:  No results for input(s): SEDRATE in the last 72 hours. Cultures:   No results for input(s): CULTURE in the last 72 hours. No results for input(s): BC, Azell Lambert in the last 72 hours. No results for input(s): CXSURG in the last 72 hours. Radiology reports as per the Radiologist  Radiology: No results found. Assessment   1. Acute kidney injury/resolving. 2.  Markedly volume overload/scrotal edema  3. Status post right radical nephrectomy and removal of vena caval thrombus  4. Insulin-dependent type 2 diabetes. 5.  Morbid abdominal obesity. Plan:  1. Continue IV Bumex/p.o. Zaroxolyn. 2.  Continue to monitor urine output and renal function. 3.  Plan was discussed with the patient. 4.  He has requested oncology consultation as well.   Maico Saavedra MD  09/02/23  10:22 AM

## 2023-09-03 LAB
ALBUMIN SERPL-MCNC: 3.1 G/DL (ref 3.5–5.2)
ALP SERPL-CCNC: 76 U/L (ref 40–130)
ALT SERPL-CCNC: 8 U/L (ref 5–41)
ANION GAP SERPL CALCULATED.3IONS-SCNC: 10 MMOL/L (ref 7–19)
AST SERPL-CCNC: 13 U/L (ref 5–40)
BILIRUB SERPL-MCNC: 0.5 MG/DL (ref 0.2–1.2)
BUN SERPL-MCNC: 11 MG/DL (ref 6–20)
CALCIUM SERPL-MCNC: 7.8 MG/DL (ref 8.6–10)
CHLORIDE SERPL-SCNC: 99 MMOL/L (ref 98–111)
CO2 SERPL-SCNC: 31 MMOL/L (ref 22–29)
CREAT SERPL-MCNC: 1.1 MG/DL (ref 0.5–1.2)
ERYTHROCYTE [DISTWIDTH] IN BLOOD BY AUTOMATED COUNT: 18.7 % (ref 11.5–14.5)
GLUCOSE BLD-MCNC: 152 MG/DL (ref 70–99)
GLUCOSE BLD-MCNC: 167 MG/DL (ref 70–99)
GLUCOSE BLD-MCNC: 168 MG/DL (ref 70–99)
GLUCOSE BLD-MCNC: 235 MG/DL (ref 70–99)
GLUCOSE SERPL-MCNC: 156 MG/DL (ref 74–109)
HCT VFR BLD AUTO: 32.5 % (ref 42–52)
HGB BLD-MCNC: 9.9 G/DL (ref 14–18)
MCH RBC QN AUTO: 25.1 PG (ref 27–31)
MCHC RBC AUTO-ENTMCNC: 30.5 G/DL (ref 33–37)
MCV RBC AUTO: 82.5 FL (ref 80–94)
PERFORMED ON: ABNORMAL
PLATELET # BLD AUTO: 250 K/UL (ref 130–400)
PMV BLD AUTO: 10.5 FL (ref 9.4–12.4)
POTASSIUM SERPL-SCNC: 4 MMOL/L (ref 3.5–5)
PROT SERPL-MCNC: 5.5 G/DL (ref 6.6–8.7)
RBC # BLD AUTO: 3.94 M/UL (ref 4.7–6.1)
SODIUM SERPL-SCNC: 140 MMOL/L (ref 136–145)
WBC # BLD AUTO: 6.7 K/UL (ref 4.8–10.8)

## 2023-09-03 PROCEDURE — 94760 N-INVAS EAR/PLS OXIMETRY 1: CPT

## 2023-09-03 PROCEDURE — 2500000003 HC RX 250 WO HCPCS: Performed by: INTERNAL MEDICINE

## 2023-09-03 PROCEDURE — 6370000000 HC RX 637 (ALT 250 FOR IP): Performed by: INTERNAL MEDICINE

## 2023-09-03 PROCEDURE — 2580000003 HC RX 258: Performed by: PSYCHIATRY & NEUROLOGY

## 2023-09-03 PROCEDURE — 6370000000 HC RX 637 (ALT 250 FOR IP): Performed by: PSYCHIATRY & NEUROLOGY

## 2023-09-03 PROCEDURE — 1180000000 HC REHAB R&B

## 2023-09-03 PROCEDURE — 80053 COMPREHEN METABOLIC PANEL: CPT

## 2023-09-03 PROCEDURE — 36415 COLL VENOUS BLD VENIPUNCTURE: CPT

## 2023-09-03 PROCEDURE — 99223 1ST HOSP IP/OBS HIGH 75: CPT | Performed by: INTERNAL MEDICINE

## 2023-09-03 PROCEDURE — 82962 GLUCOSE BLOOD TEST: CPT

## 2023-09-03 PROCEDURE — 85027 COMPLETE CBC AUTOMATED: CPT

## 2023-09-03 RX ORDER — POLYETHYLENE GLYCOL 3350 17 G/17G
17 POWDER, FOR SOLUTION ORAL DAILY PRN
Status: DISCONTINUED | OUTPATIENT
Start: 2023-09-03 | End: 2023-09-08 | Stop reason: HOSPADM

## 2023-09-03 RX ORDER — SODIUM CHLORIDE 0.9 % (FLUSH) 0.9 %
5-40 SYRINGE (ML) INJECTION 2 TIMES DAILY
Status: DISCONTINUED | OUTPATIENT
Start: 2023-09-03 | End: 2023-09-08 | Stop reason: HOSPADM

## 2023-09-03 RX ORDER — ACETAMINOPHEN 500 MG
1000 TABLET ORAL EVERY 6 HOURS PRN
Status: DISCONTINUED | OUTPATIENT
Start: 2023-09-03 | End: 2023-09-08 | Stop reason: HOSPADM

## 2023-09-03 RX ADMIN — BUMETANIDE 1 MG: 0.25 INJECTION INTRAMUSCULAR; INTRAVENOUS at 17:06

## 2023-09-03 RX ADMIN — SODIUM CHLORIDE, PRESERVATIVE FREE 10 ML: 5 INJECTION INTRAVENOUS at 21:19

## 2023-09-03 RX ADMIN — SENNOSIDES AND DOCUSATE SODIUM 1 TABLET: 50; 8.6 TABLET ORAL at 21:11

## 2023-09-03 RX ADMIN — SENNOSIDES AND DOCUSATE SODIUM 1 TABLET: 50; 8.6 TABLET ORAL at 07:51

## 2023-09-03 RX ADMIN — INSULIN LISPRO 8 UNITS: 100 INJECTION, SOLUTION INTRAVENOUS; SUBCUTANEOUS at 17:29

## 2023-09-03 RX ADMIN — INSULIN GLARGINE 20 UNITS: 100 INJECTION, SOLUTION SUBCUTANEOUS at 21:11

## 2023-09-03 RX ADMIN — APIXABAN 5 MG: 5 TABLET, FILM COATED ORAL at 21:11

## 2023-09-03 RX ADMIN — SIMETHICONE 80 MG: 80 TABLET, CHEWABLE ORAL at 14:01

## 2023-09-03 RX ADMIN — PREGABALIN 150 MG: 50 CAPSULE ORAL at 21:11

## 2023-09-03 RX ADMIN — METOLAZONE 5 MG: 2.5 TABLET ORAL at 07:51

## 2023-09-03 RX ADMIN — PREGABALIN 150 MG: 50 CAPSULE ORAL at 07:51

## 2023-09-03 RX ADMIN — BUMETANIDE 1 MG: 0.25 INJECTION INTRAMUSCULAR; INTRAVENOUS at 07:51

## 2023-09-03 RX ADMIN — PANTOPRAZOLE SODIUM 40 MG: 40 TABLET, DELAYED RELEASE ORAL at 07:51

## 2023-09-03 RX ADMIN — TRAZODONE HYDROCHLORIDE 50 MG: 50 TABLET ORAL at 21:11

## 2023-09-03 RX ADMIN — MULTIPLE VITAMINS W/ MINERALS TAB 1 TABLET: TAB at 07:51

## 2023-09-03 RX ADMIN — OXYCODONE HYDROCHLORIDE 5 MG: 5 TABLET ORAL at 21:13

## 2023-09-03 RX ADMIN — POLYETHYLENE GLYCOL 3350 17 G: 17 POWDER, FOR SOLUTION ORAL at 14:39

## 2023-09-03 RX ADMIN — APIXABAN 5 MG: 5 TABLET, FILM COATED ORAL at 07:51

## 2023-09-03 ASSESSMENT — PAIN DESCRIPTION - DESCRIPTORS: DESCRIPTORS: ACHING;DISCOMFORT

## 2023-09-03 ASSESSMENT — PAIN SCALES - GENERAL
PAINLEVEL_OUTOF10: 2
PAINLEVEL_OUTOF10: 6

## 2023-09-03 ASSESSMENT — PAIN DESCRIPTION - ORIENTATION: ORIENTATION: LOWER

## 2023-09-03 ASSESSMENT — PAIN - FUNCTIONAL ASSESSMENT: PAIN_FUNCTIONAL_ASSESSMENT: ACTIVITIES ARE NOT PREVENTED

## 2023-09-03 ASSESSMENT — PAIN DESCRIPTION - LOCATION: LOCATION: BACK

## 2023-09-03 NOTE — PROGRESS NOTES
Nephrology ([facility]) Progress Note      Patient:  Darryle Litten  YOB: 1970  Date of Service: 9/3/2023  MRN: [id number]   Acct: [de-identified]   Primary Care Physician: Yesenia Eaton MD  Advance Directive: Full Code  Admit Date: 8/31/2023       Hospital Day: 3  Referring Provider: Starr Bence, MD    Patient independently seen and examined, Chart, Consults, Notes, Operative notes, Labs, Cardiology, and Radiology studies reviewed as available. Chief complaint: Abnormal labs/increasing edema. Subjective:  Darryle Litten is a 52 y.o. male for whom we were consulted for evaluation and treatment of increasing edema/anasarca. He was recently hospitalized at [facility] with nausea vomiting and abdominal pain. 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 Urology has recommended to transfer him to tertiary care center. He was accepted to [facility] where he underwent right radical nephrectomy and renal caval thrombectomy. He tolerated procedure very well. Postprocedure he was transferred to [facility] for rehab. However he has noticed increasing edema of lower extremities and scrotal edema. His serum creatinine is now 1.2 mg and nephrology is consulted for the treatment of edema and CKD. Otherwise he has history of type 2 diabetes, insulin-dependent, obesity, obstructive sleep apnea, hypertension and history of panic attack. Hospital course remarkable for  administration of IV diuretics and he has excellent urine output. This morning patient is feeling much better. He is responding to intravenous diuretics. His renal function remained stable. His scrotal edema is improving    Allergies:  Patient has no known allergies.     Medicines:  Current Facility-Administered Medications   Medication Dose Route Frequency Provider Last Rate Last Admin    acetaminophen (TYLENOL) tablet 1,000 mg  1,000 ROSENDO Turner MD   30 mL at 09/02/23 1419       Past Medical History:  Past Medical History:   Diagnosis Date    Anxiety     Depression     Intervertebral lumbar disc disorder with myelopathy, lumbar region     Neuropathy     Obstructive sleep apnea syndrome     Panic attacks     Primary hypertension     Type 2 diabetes mellitus without complication, with long-term current use of insulin [facility])        Past Surgical History:  Past Surgical History:   Procedure Laterality Date    COLONOSCOPY N/A 03/25/2022    Dr Sandip Rivera, AT (x5) W Dysplasia, TVA  (-)Dysplasia, int hemorrhoids Gr 2 wo bleeding, sub prep fair, 1 year recall       Family History  Family History   Problem Relation Age of Onset    Heart Disease Mother     Heart Disease Father     Diabetes Father        Social History  Social History     Socioeconomic History    Marital status: Single     Spouse name: Not on file    Number of children: Not on file    Years of education: Not on file    Highest education level: Not on file   Occupational History    Not on file   Tobacco Use    Smoking status: Never    Smokeless tobacco: Never   Substance and Sexual Activity    Alcohol use: Not Currently    Drug use: Never    Sexual activity: Not on file   Other Topics Concern    Not on file   Social History Narrative    Not on file     Social Determinants of Health     Financial Resource Strain: Not on file   Food Insecurity: Not on file   Transportation Needs: Not on file   Physical Activity: Not on file   Stress: Not on file   Social Connections: Not on file   Intimate Partner Violence: Not on file   Housing Stability: Not on file         Review of Systems:  History obtained from chart review and the patient  General ROS: No fever or chills  Respiratory ROS: No cough, shortness of breath, wheezing  Cardiovascular ROS: No chest pain or palpitations  Gastrointestinal ROS: No abdominal pain or melena  Genito-Urinary ROS: Scrotal edema  Musculoskeletal ROS: No joint

## 2023-09-03 NOTE — PLAN OF CARE
Problem: Safety - Adult  Goal: Free from fall injury  Outcome: Progressing     Problem: ABCDS Injury Assessment  Goal: Absence of physical injury  Outcome: Progressing     Problem: Skin/Tissue Integrity  Goal: Absence of new skin breakdown  Description: 1. Monitor for areas of redness and/or skin breakdown  2. Assess vascular access sites hourly  3. Every 4-6 hours minimum:  Change oxygen saturation probe site  4. Every 4-6 hours:  If on nasal continuous positive airway pressure, respiratory therapy assess nares and determine need for appliance change or resting period. Outcome: Progressing     Problem: Discharge Planning  Goal: Discharge to home or other facility with appropriate resources  Outcome: Progressing     Problem: Chronic Conditions and Co-morbidities  Goal: Patient's chronic conditions and co-morbidity symptoms are monitored and maintained or improved  Outcome: Progressing     Problem: Neurosensory - Adult  Goal: Achieves stable or improved neurological status  Outcome: Progressing     Problem: Skin/Tissue Integrity - Adult  Goal: Incisions, wounds, or drain sites healing without S/S of infection  Outcome: Progressing     Problem: Musculoskeletal - Adult  Goal: Return mobility to safest level of function  Outcome: Progressing     Problem: Gastrointestinal - Adult  Goal: Maintains or returns to baseline bowel function  Outcome: Progressing     Problem: Pain  Goal: Verbalizes/displays adequate comfort level or baseline comfort level  Outcome: Progressing     Problem: Genitourinary - Adult  Goal: Absence of urinary retention  Outcome: Progressing  Goal: Urinary catheter remains patent  Outcome: Progressing       Electronically signed by Manuel Arias on 9/3/2023 at 5:30 PM

## 2023-09-04 LAB
ALBUMIN SERPL-MCNC: 3.2 G/DL (ref 3.5–5.2)
ALP SERPL-CCNC: 431 U/L (ref 40–130)
ALT SERPL-CCNC: 91 U/L (ref 5–41)
ANION GAP SERPL CALCULATED.3IONS-SCNC: 11 MMOL/L (ref 7–19)
AST SERPL-CCNC: 132 U/L (ref 5–40)
BASOPHILS # BLD: 0.1 K/UL (ref 0–0.2)
BASOPHILS NFR BLD: 0.8 % (ref 0–1)
BILIRUB SERPL-MCNC: 0.7 MG/DL (ref 0.2–1.2)
BUN SERPL-MCNC: 13 MG/DL (ref 6–20)
CALCIUM SERPL-MCNC: 8.1 MG/DL (ref 8.6–10)
CHLORIDE SERPL-SCNC: 94 MMOL/L (ref 98–111)
CO2 SERPL-SCNC: 33 MMOL/L (ref 22–29)
CREAT SERPL-MCNC: 1.2 MG/DL (ref 0.5–1.2)
EOSINOPHIL # BLD: 0.4 K/UL (ref 0–0.6)
EOSINOPHIL NFR BLD: 5.9 % (ref 0–5)
ERYTHROCYTE [DISTWIDTH] IN BLOOD BY AUTOMATED COUNT: 18.8 % (ref 11.5–14.5)
GLUCOSE BLD-MCNC: 146 MG/DL (ref 70–99)
GLUCOSE BLD-MCNC: 171 MG/DL (ref 70–99)
GLUCOSE BLD-MCNC: 193 MG/DL (ref 70–99)
GLUCOSE BLD-MCNC: 207 MG/DL (ref 70–99)
GLUCOSE SERPL-MCNC: 162 MG/DL (ref 74–109)
HCT VFR BLD AUTO: 32.1 % (ref 42–52)
HGB BLD-MCNC: 9.8 G/DL (ref 14–18)
IMM GRANULOCYTES # BLD: 0 K/UL
LYMPHOCYTES # BLD: 1.3 K/UL (ref 1.1–4.5)
LYMPHOCYTES NFR BLD: 20.2 % (ref 20–40)
MCH RBC QN AUTO: 25.4 PG (ref 27–31)
MCHC RBC AUTO-ENTMCNC: 30.5 G/DL (ref 33–37)
MCV RBC AUTO: 83.2 FL (ref 80–94)
MONOCYTES # BLD: 0.5 K/UL (ref 0–0.9)
MONOCYTES NFR BLD: 7.5 % (ref 0–10)
NEUTROPHILS # BLD: 4.1 K/UL (ref 1.5–7.5)
NEUTS SEG NFR BLD: 65 % (ref 50–65)
PERFORMED ON: ABNORMAL
PLATELET # BLD AUTO: 236 K/UL (ref 130–400)
PMV BLD AUTO: 11.2 FL (ref 9.4–12.4)
POTASSIUM SERPL-SCNC: 3.9 MMOL/L (ref 3.5–5)
PROT SERPL-MCNC: 5.7 G/DL (ref 6.6–8.7)
RBC # BLD AUTO: 3.86 M/UL (ref 4.7–6.1)
SODIUM SERPL-SCNC: 138 MMOL/L (ref 136–145)
WBC # BLD AUTO: 6.3 K/UL (ref 4.8–10.8)

## 2023-09-04 PROCEDURE — 2500000003 HC RX 250 WO HCPCS: Performed by: INTERNAL MEDICINE

## 2023-09-04 PROCEDURE — 2580000003 HC RX 258: Performed by: PSYCHIATRY & NEUROLOGY

## 2023-09-04 PROCEDURE — 94760 N-INVAS EAR/PLS OXIMETRY 1: CPT

## 2023-09-04 PROCEDURE — 36415 COLL VENOUS BLD VENIPUNCTURE: CPT

## 2023-09-04 PROCEDURE — 97530 THERAPEUTIC ACTIVITIES: CPT

## 2023-09-04 PROCEDURE — 1180000000 HC REHAB R&B

## 2023-09-04 PROCEDURE — 82962 GLUCOSE BLOOD TEST: CPT

## 2023-09-04 PROCEDURE — 85025 COMPLETE CBC W/AUTO DIFF WBC: CPT

## 2023-09-04 PROCEDURE — 97110 THERAPEUTIC EXERCISES: CPT

## 2023-09-04 PROCEDURE — 6370000000 HC RX 637 (ALT 250 FOR IP): Performed by: INTERNAL MEDICINE

## 2023-09-04 PROCEDURE — 6370000000 HC RX 637 (ALT 250 FOR IP): Performed by: PSYCHIATRY & NEUROLOGY

## 2023-09-04 PROCEDURE — 99232 SBSQ HOSP IP/OBS MODERATE 35: CPT | Performed by: PSYCHIATRY & NEUROLOGY

## 2023-09-04 PROCEDURE — 97116 GAIT TRAINING THERAPY: CPT

## 2023-09-04 PROCEDURE — 97535 SELF CARE MNGMENT TRAINING: CPT

## 2023-09-04 PROCEDURE — 80053 COMPREHEN METABOLIC PANEL: CPT

## 2023-09-04 RX ORDER — TRAMADOL HYDROCHLORIDE 50 MG/1
50 TABLET ORAL EVERY 6 HOURS PRN
Status: DISCONTINUED | OUTPATIENT
Start: 2023-09-04 | End: 2023-09-08 | Stop reason: HOSPADM

## 2023-09-04 RX ADMIN — SENNOSIDES AND DOCUSATE SODIUM 1 TABLET: 50; 8.6 TABLET ORAL at 08:15

## 2023-09-04 RX ADMIN — BUMETANIDE 1 MG: 0.25 INJECTION INTRAMUSCULAR; INTRAVENOUS at 08:15

## 2023-09-04 RX ADMIN — PREGABALIN 150 MG: 50 CAPSULE ORAL at 08:15

## 2023-09-04 RX ADMIN — APIXABAN 5 MG: 5 TABLET, FILM COATED ORAL at 08:15

## 2023-09-04 RX ADMIN — SENNOSIDES AND DOCUSATE SODIUM 1 TABLET: 50; 8.6 TABLET ORAL at 20:50

## 2023-09-04 RX ADMIN — BUMETANIDE 1 MG: 0.25 INJECTION INTRAMUSCULAR; INTRAVENOUS at 16:11

## 2023-09-04 RX ADMIN — APIXABAN 5 MG: 5 TABLET, FILM COATED ORAL at 20:50

## 2023-09-04 RX ADMIN — MULTIPLE VITAMINS W/ MINERALS TAB 1 TABLET: TAB at 08:15

## 2023-09-04 RX ADMIN — INSULIN GLARGINE 20 UNITS: 100 INJECTION, SOLUTION SUBCUTANEOUS at 20:50

## 2023-09-04 RX ADMIN — PANTOPRAZOLE SODIUM 40 MG: 40 TABLET, DELAYED RELEASE ORAL at 08:15

## 2023-09-04 RX ADMIN — TRAZODONE HYDROCHLORIDE 50 MG: 50 TABLET ORAL at 20:53

## 2023-09-04 RX ADMIN — PREGABALIN 150 MG: 50 CAPSULE ORAL at 20:50

## 2023-09-04 RX ADMIN — MAGNESIUM HYDROXIDE 30 ML: 400 SUSPENSION ORAL at 20:49

## 2023-09-04 RX ADMIN — SODIUM CHLORIDE, PRESERVATIVE FREE 10 ML: 5 INJECTION INTRAVENOUS at 20:51

## 2023-09-04 RX ADMIN — TRAMADOL HYDROCHLORIDE 50 MG: 50 TABLET, COATED ORAL at 20:48

## 2023-09-04 RX ADMIN — INSULIN LISPRO 8 UNITS: 100 INJECTION, SOLUTION INTRAVENOUS; SUBCUTANEOUS at 16:21

## 2023-09-04 RX ADMIN — METOLAZONE 5 MG: 2.5 TABLET ORAL at 08:15

## 2023-09-04 RX ADMIN — TRAMADOL HYDROCHLORIDE 50 MG: 50 TABLET, COATED ORAL at 15:40

## 2023-09-04 RX ADMIN — SODIUM CHLORIDE, PRESERVATIVE FREE 10 ML: 5 INJECTION INTRAVENOUS at 08:15

## 2023-09-04 ASSESSMENT — PAIN SCALES - GENERAL
PAINLEVEL_OUTOF10: 6
PAINLEVEL_OUTOF10: 2
PAINLEVEL_OUTOF10: 6
PAINLEVEL_OUTOF10: 2

## 2023-09-04 ASSESSMENT — PAIN DESCRIPTION - ORIENTATION: ORIENTATION: RIGHT;LOWER

## 2023-09-04 ASSESSMENT — PAIN - FUNCTIONAL ASSESSMENT: PAIN_FUNCTIONAL_ASSESSMENT: ACTIVITIES ARE NOT PREVENTED

## 2023-09-04 ASSESSMENT — PAIN DESCRIPTION - LOCATION
LOCATION: BACK;LEG
LOCATION: LEG;SCROTUM

## 2023-09-04 ASSESSMENT — PAIN DESCRIPTION - DESCRIPTORS
DESCRIPTORS: ACHING;DISCOMFORT
DESCRIPTORS: ACHING;TIGHTNESS

## 2023-09-04 NOTE — PROGRESS NOTES
ROSENDO Ayala MD        sennosides-docusate sodium (SENOKOT-S) 8.6-50 MG tablet 1 tablet  1 tablet Oral BID Jossie Ayala MD   1 tablet at 09/04/23 0815    magnesium hydroxide (MILK OF MAGNESIA) 400 MG/5ML suspension 30 mL  30 mL Oral Daily ROSENDO Ayala MD   30 mL at 09/02/23 1419       Past Medical History:  Past Medical History:   Diagnosis Date    Anxiety     Depression     Intervertebral lumbar disc disorder with myelopathy, lumbar region     Neuropathy     Obstructive sleep apnea syndrome     Panic attacks     Primary hypertension     Type 2 diabetes mellitus without complication, with long-term current use of insulin ([address])        Past Surgical History:  Past Surgical History:   Procedure Laterality Date    COLONOSCOPY N/A 03/25/2022    Dr Roscoe Guzman, AT (x5) W Dysplasia, TVA  (-)Dysplasia, int hemorrhoids Gr 2 wo bleeding, sub prep fair, 1 year recall       Family History  Family History   Problem Relation Age of Onset    Heart Disease Mother     Heart Disease Father     Diabetes Father        Social History  Social History     Socioeconomic History    Marital status: Single     Spouse name: Not on file    Number of children: Not on file    Years of education: Not on file    Highest education level: Not on file   Occupational History    Not on file   Tobacco Use    Smoking status: Never    Smokeless tobacco: Never   Substance and Sexual Activity    Alcohol use: Not Currently    Drug use: Never    Sexual activity: Not on file   Other Topics Concern    Not on file   Social History Narrative    Not on file     Social Determinants of Health     Financial Resource Strain: Not on file   Food Insecurity: Not on file   Transportation Needs: Not on file   Physical Activity: Not on file   Stress: Not on file   Social Connections: Not on file   Intimate Partner Violence: Not on file   Housing Stability: Not on file         Review of Systems:  History obtained from chart review and the patient  General ROS: No +------------------------------------+----------+---------------+----------+ ! Location                            ! Visualized! Compressibility! Thrombosis! +------------------------------------+----------+---------------+----------+ ! External Iliac                      ! No        !               !          ! +------------------------------------+----------+---------------+----------+ ! Sapheno Femoral Junction            ! Yes       ! No             !          ! +------------------------------------+----------+---------------+----------+ ! Common Femoral                      !Yes       ! No             !          ! +------------------------------------+----------+---------------+----------+ ! Prox Femoral                        !Yes       ! No             !          ! +------------------------------------+----------+---------------+----------+ ! Mid Femoral                         !Yes       ! No             !          ! +------------------------------------+----------+---------------+----------+ ! Dist Femoral                        !Yes       ! No             !          ! +------------------------------------+----------+---------------+----------+ ! Deep Femoral                        !Yes       ! No             !          ! +------------------------------------+----------+---------------+----------+ ! Popliteal                           !Yes       ! No             !          ! +------------------------------------+----------+---------------+----------+ ! SSV                                 ! Yes       ! Yes            ! None      ! +------------------------------------+----------+---------------+----------+ ! Gastroc                             ! Yes       ! No             !          ! +------------------------------------+----------+---------------+----------+ ! PTV                                 ! Yes       ! No             !          ! +------------------------------------+----------+---------------+----------+ ! ANDERV

## 2023-09-04 NOTE — PATIENT CARE CONFERENCE
[facility] ACUTE INPATIENT REHABILITATION  TEAM CONFERENCE NOTE    Date: 2023  Patient Name: Lars Hernandez        MRN: [id number]    : 1970  (52 y.o.)  Gender: male      Diagnosis:  R RADICAL NEPHRECTOMY AND CAVAL THROMBECTOMY      PHYSICAL THERAPY  GROSS ASSESSMENT       BED MOBILITY  Bed mobility  Rolling to Left: Stand by assistance  Rolling to Right: Stand by assistance  Supine to Sit: Supervision  Sit to Supine: Supervision  Scooting: Modified independent  Bed Mobility Comments: On L side of bed- use of bedrail       TRANSFERS  Transfers  Sit to Stand: Supervision  Stand to Sit: Supervision  Bed to Chair: Stand by assistance  Car Transfer: Stand by assistance, Contact guard assistance (2X- 1st trial car height lowered to simualte [place], 2nd trial car height higher to simulate truck + step (to simulate running board))  WHEELCHAIR PROPULSION     AMBULATION  Ambulation  Surface: Level tile  Device: Rolling Walker  Other Apparatus: Wheelchair follow (Not necessary)  Assistance: Stand by assistance  Quality of Gait: wide GENEVA- improved vs previous txs, slight anterior lean when moving RW, toe-out gait, reciprocal pattern  Gait Deviations: Slow Cherelle, Increased GENEVA, Decreased step length, Decreased step height  Distance: 100', 125'  Comments: Multiple L/R turns  More Ambulation?: Yes  STAIRS  Stairs  # Steps : 3  Stairs Height: 6\"  Rails: Right ascending  Assistance: Stand by assistance, Contact guard assistance  Comment: step to pattern, pt able to recall correct BLE sequence when asked- did not perform  GOALS:  Short Term Goals  Time Frame for Short Term Goals: 1 WEEK  Short Term Goal 1: BED MOBILITY ALL SBA  Short Term Goal 2: SIT>STAND AND STAND>SIT INDEPENDENTLY  Short Term Goal 3: STEP ONTO CURB/2IN SURFACE CGA  Short Term Goal 4: AMBULATE 20' WITH RW SBA  Short Term Goal 5: HEP SBA    Long Term Goals  Time Frame for Long Term Goals : 3 WEEKS  Long Term Goal 1: BED MOBILITY INDEPENDENT  Long

## 2023-09-04 NOTE — PROGRESS NOTES
Facility/Department: [facility] 8 REHAB UNIT  Occupational Therapy Treatment Note    Name: Hunter Juarez  : 1970  MRN: [id number]  Date of Service: 2023    Discharge Recommendations:  Home with assist PRN, Home with Home health OT          Patient Diagnosis(es): There were no encounter diagnoses. Past Medical History:  has a past medical history of Anxiety, Depression, Intervertebral lumbar disc disorder with myelopathy, lumbar region, Neuropathy, Obstructive sleep apnea syndrome, Panic attacks, Primary hypertension, and Type 2 diabetes mellitus without complication, with long-term current use of insulin ([address]). Past Surgical History:  has a past surgical history that includes Colonoscopy (N/A, 2022). Treatment Diagnosis: right renalmass with s/p nephrectomy      Assessment   Performance deficits / Impairments: Decreased functional mobility ; Decreased ADL status; Decreased high-level IADLs;Decreased endurance;Decreased strength  Assessment: observed decreased scrotal edema in comparision to when last seen  Activity Tolerance  Activity Tolerance: Patient Tolerated treatment well            Plan   Occupational Therapy Plan  Specific Instructions for Next Treatment: AE training  Current Treatment Recommendations: Strengthening, Patient/Caregiver education & training, Balance training, Functional mobility training, Equipment evaluation, education, & procurement, Home management training, Endurance training, Safety education & training, Positioning, Self-Care / ADL     Restrictions  Restrictions/Precautions  Restrictions/Precautions: Fall Risk  Position Activity Restriction  Other position/activity restrictions: abdominal staples--gait belt to be worn high, severe scrotal edema with compression garment       23 1100   General   Family / Caregiver Present No   Pre Treatment Pain Screening   Pain at present 6   Scale Used Numeric Score   Intervention List Patient able to continue with treatment   Comments / Independent    Putting On/Taking Off Footwear  Assistance Needed: Dependent  Comment: without AE, socks only  CARE Score: 1  Discharge Goal: Independent        Goals  Short Term Goals  Time Frame for Short Term Goals: 1 week  Short Term Goal 1: complete LB dressing with AE with supervision  Short Term Goal 2: complete 1-2 handed standing level tasks for 3 mins with supervision  Short Term Goal 3: complete overall toileting with supervision  Short Term Goal 4: complete overall bathing with LH sponge with supervision  Short Term Goal 5: complete simple ambulatory home making task with supervision  Long Term Goals  Time Frame for Long Term Goals : 2 weeks  Long Term Goal 1: complete overall toileting with modified independence  Long Term Goal 2: complete overall bathing with modified independence  Long Term Goal 3: complete simple ambulatory home making task with modified independence  Long Term Goal 4: complete HEP with independence  Long Term Goal 5: complete overall dressing with modified independence       Therapy Time   Individual Concurrent Group Co-treatment   Time In 1100         Time Out 1200         Minutes 60         Timed Code Treatment Minutes: 60 Minutes       Electronically signed by Janice Cornejo OT on 9/4/2023 at 12:48 PM

## 2023-09-04 NOTE — PROGRESS NOTES
09/04/23 0900   Subjective   Subjective pt complains of back/sciatica pain   Bed mobility   Supine to Sit Stand by assistance  (No HHA for sitting on side of bed from supine)   Transfers   Sit to Stand Stand by assistance   Stand to Sit Stand by assistance   Bed to Chair Stand by assistance   Ambulation   Surface Level tile   Device Rolling Walker   Assistance Stand by assistance   Quality of Gait wide GENEVA, slight anterior lean when moving RW, toe-out gait, reciprocal pattern   Gait Deviations Slow Cherelle; Increased GENEVA; Decreased step length;Decreased step height   Distance 54'   Comments pt using narrow RW, therapist suggested trying wider walker to appropriately ambulate with AD   More Ambulation? Yes   Ambulation 2   Surface - 2 level tile   Device 2 Rolling Walker   Other Apparatus 2 Wheelchair follow   Assistance 2 Stand by assistance   Quality of Gait 2 reciprocal gait pattern, within walker, wide GENEVA   Gait Deviations Slow Cherelle;Decreased step length;Decreased step height   Distance 30'   Comments patient used wider RW, improved gait   Ambulation 3   Surface - 3 level tile   Device 3 Rolling Walker   Other Apparatus 3 Wheelchair follow   Assistance 3 Stand by assistance   Quality of Gait 3 RECIPROCAL GAIT PATTERN , WITHIN WALKER, WIDE GENEVA   Gait Deviations Slow Cherelle;Decreased step length;Decreased step height; Increased GENEVA   Distance 50'   Comments MADE 1 L AND 1 R TURN 180* SMOOTHLY   PT Exercises   Exercise Treatment SUPINE ANKLE PUMPS X 20 BLE   Assessment   Assessment Pt has decreased swelling and edema since 9/01. Pt ambulated in hallway for first time since admission. Improved ambulation with change in AD. Pt limited this session d/t increase in sciatica/back pain. Safety Devices   Type of Devices Call light within reach; Left in bed   PT Individual Minutes   Time In 0900   Time Out 1000   Minutes 60

## 2023-09-04 NOTE — PROGRESS NOTES
Facility/Department: [facility] 8 REHAB UNIT  Occupational Therapy     Name: Devin Kim  : 1970  MRN: [id number]  Date of Service: 2023    Discharge Recommendations:  Home with assist PRN, Home with Home health OT    Patient Diagnosis(es): There were no encounter diagnoses. Past Medical History:  has a past medical history of Anxiety, Depression, Intervertebral lumbar disc disorder with myelopathy, lumbar region, Neuropathy, Obstructive sleep apnea syndrome, Panic attacks, Primary hypertension, and Type 2 diabetes mellitus without complication, with long-term current use of insulin ([address] Past Surgical History:  has a past surgical history that includes Colonoscopy (N/A, 2022). Treatment Diagnosis: right renalmass with s/p nephrectomy      Assessment   Performance deficits / Impairments: Decreased functional mobility ; Decreased ADL status; Decreased high-level IADLs;Decreased endurance;Decreased strength  Assessment: observed decreased scrotal edema in comparision to when last seen  Treatment Diagnosis: right renalmass with s/p nephrectomy  Prognosis: Good  History: DM with insulin therapy, HTN, chronic back pain, Depression  Activity Tolerance  Activity Tolerance: Patient Tolerated treatment well     Plan   Occupational Therapy Plan  Specific Instructions for Next Treatment: AE training  Current Treatment Recommendations: Strengthening, Patient/Caregiver education & training, Balance training, Functional mobility training, Equipment evaluation, education, & procurement, Home management training, Endurance training, Safety education & training, Positioning, Self-Care / ADL     Restrictions  Restrictions/Precautions  Restrictions/Precautions: Fall Risk  Position Activity Restriction  Other position/activity restrictions: abdominal staples--gait belt to be worn high, severe scrotal edema with compression garment    Subjective   General  Chart Reviewed: Yes, Orders  Patient assessed for rehabilitation

## 2023-09-04 NOTE — PROGRESS NOTES
Nutrition Assessment     Type and Reason for Visit: Reassess    Nutrition Recommendations/Plan:   Continue POC, update preferences. Discontinue ONS--PO intake is adequate. Malnutrition Assessment:  Malnutrition Status: No malnutrition    Nutrition Assessment:  PO intake has remained adequate with >75% of meals consumed. Pt states of good appetite. Wt remains stable at this time. BG avg here <200mg/dL with highest reading 228mg/dL. HgbA1c 5.7%. Pt notes his BG was better controlled at home prior to surgery on 8/22. Home DM meds ordered. Will monitor. Preferences obtained and updated. Nutrition Related Findings:   +3 BLE edema, scrotal edema; Home DM meds: Lantus 20u PM, Humalog 0-4u with meals Wound Type: Surgical Incision (abdomen)    Current Nutrition Therapies:    ADULT DIET;  Regular; 4 carb choices (60 gm/meal)  ADULT ORAL NUTRITION SUPPLEMENT; Dinner; Diabetic Oral Supplement    Anthropometric Measures:  Height: 6' (182.9 cm)  Current Body Wt: 305 lb 14.4 oz (138.8 kg)   BMI: 41.5    Nutrition Diagnosis:   Altered nutrition-related lab values related to endocrine dysfuntion as evidenced by lab values    Nutrition Interventions:   Food and/or Nutrient Delivery: Continue Current Diet  Nutrition Education/Counseling: No recommendation at this time  Coordination of Nutrition Care: Continue to monitor while inpatient       Goals:  Previous Goal Met: Goal(s) Achieved  Goals: PO intake 50% or greater       Nutrition Monitoring and Evaluation:   Behavioral-Environmental Outcomes: None Identified  Food/Nutrient Intake Outcomes: Food and Nutrient Intake  Physical Signs/Symptoms Outcomes: Weight, Biochemical Data, Nutrition Focused Physical Findings    Discharge Planning:    Continue current diet     Tayla Nina, MS, RD, LD, [facility]  Contact: [phone]

## 2023-09-04 NOTE — PLAN OF CARE
Problem: Safety - Adult  Goal: Free from fall injury  Outcome: Progressing     Problem: ABCDS Injury Assessment  Goal: Absence of physical injury  Outcome: Progressing     Problem: Skin/Tissue Integrity  Goal: Absence of new skin breakdown  Description: 1. Monitor for areas of redness and/or skin breakdown  2. Assess vascular access sites hourly  3. Every 4-6 hours minimum:  Change oxygen saturation probe site  4. Every 4-6 hours:  If on nasal continuous positive airway pressure, respiratory therapy assess nares and determine need for appliance change or resting period. Outcome: Progressing     Problem: Discharge Planning  Goal: Discharge to home or other facility with appropriate resources  Outcome: Progressing     Problem: Chronic Conditions and Co-morbidities  Goal: Patient's chronic conditions and co-morbidity symptoms are monitored and maintained or improved  Outcome: Progressing     Problem: Neurosensory - Adult  Goal: Achieves stable or improved neurological status  Outcome: Progressing     Problem: Skin/Tissue Integrity - Adult  Goal: Incisions, wounds, or drain sites healing without S/S of infection  Outcome: Progressing     Problem: Musculoskeletal - Adult  Goal: Return mobility to safest level of function  Outcome: Progressing     Problem: Gastrointestinal - Adult  Goal: Maintains or returns to baseline bowel function  Outcome: Progressing     Problem: Pain  Goal: Verbalizes/displays adequate comfort level or baseline comfort level  Outcome: Progressing     Problem: Genitourinary - Adult  Goal: Absence of urinary retention  Outcome: Progressing  Goal: Urinary catheter remains patent  Outcome: Progressing       Electronically signed by Esther Crabtree on 9/4/2023 at 4:48 PM

## 2023-09-04 NOTE — PLAN OF CARE
Problem: Safety - Adult  Goal: Free from fall injury  9/3/2023 2317 by Ruba Fuentes RN  Outcome: Progressing  9/3/2023 1730 by Adalberto Johnson  Outcome: Progressing     Problem: ABCDS Injury Assessment  Goal: Absence of physical injury  9/3/2023 2317 by Ruba Fuentes RN  Outcome: Progressing  9/3/2023 1730 by Adalberto Johnson  Outcome: Progressing     Problem: Skin/Tissue Integrity  Goal: Absence of new skin breakdown  Description: 1. Monitor for areas of redness and/or skin breakdown  2. Assess vascular access sites hourly  3. Every 4-6 hours minimum:  Change oxygen saturation probe site  4. Every 4-6 hours:  If on nasal continuous positive airway pressure, respiratory therapy assess nares and determine need for appliance change or resting period.   9/3/2023 2317 by Ruba Fuentes RN  Outcome: Progressing  9/3/2023 1730 by Adalberto Johnson  Outcome: Progressing     Problem: Discharge Planning  Goal: Discharge to home or other facility with appropriate resources  9/3/2023 2317 by Ruba Fuentes RN  Outcome: Progressing  Flowsheets (Taken 9/3/2023 2110)  Discharge to home or other facility with appropriate resources: Refer to discharge planning if patient needs post-hospital services based on physician order or complex needs related to functional status, cognitive ability or social support system  9/3/2023 1730 by Adalberto Johnson  Outcome: Progressing     Problem: Chronic Conditions and Co-morbidities  Goal: Patient's chronic conditions and co-morbidity symptoms are monitored and maintained or improved  9/3/2023 2317 by Ruba Fuentes RN  Outcome: Progressing  [address] (Taken 9/3/2023 2110)  Care Plan - Patient's Chronic Conditions and Co-Morbidity Symptoms are Monitored and Maintained or Improved: Monitor and assess patient's chronic conditions and comorbid symptoms for stability, deterioration, or improvement  9/3/2023 1730 by Adalberto Johnson  Outcome: Progressing     Problem: Neurosensory - Adult  Goal: Achieves stable

## 2023-09-04 NOTE — PROGRESS NOTES
09/04/23 1300   General   Chart Reviewed Yes   Patient assessed for rehabilitation services? Yes   Additional Pertinent Hx HYPERTENSION, ACUTE KIDNEY FAILURE, ACUTE EMBOLISM/THROMBOSIS OF LE, LI, OBESITY   Diagnosis 8/22 R RADICAL NEPHRECTOMY AND CAVAL THROMBECTOMY   Subjective   Subjective PT IN BED UPON ARRIVAL   Subjective   Subjective t concerned about swelling and edema in LE and scrotum after walking. Bed mobility   Sit to Supine Stand by assistance   Transfers   Sit to Stand Stand by assistance   Stand to Sit Stand by assistance   Ambulation   Surface Level tile   Device Rolling Walker   Other Apparatus Wheelchair follow   Assistance Stand by assistance   Quality of Gait wide GENEVA, slight anterior lean when moving RW, toe-out gait, reciprocal pattern   Gait Deviations Slow Cherelle; Increased GENEVA; Decreased step length;Decreased step height   Distance 20' X 5   Comments pt ambulated \"mini\" lap with rest breaks in between. Assessment   Assessment PT RECEIVED  CONFIRMATION THAT AMBULATION WAS BENEFICIAL FOR EDEMA IN LEGS. PT INCREASED AMOUNT OF TIME SPENT AMBULATING THIS SESSION AND ABLE TO WALK LONGER DISTANCE IN TOTAL WITH NO GAIT DEVIATIONS UNRELATED TO EDEMA IN SCROTUM AND LE.    Safety Devices   Type of Devices Left in chair;Call light within reach   PT Individual Minutes   Time In 1300   Time Out 1345   Minutes 45

## 2023-09-05 LAB
ANION GAP SERPL CALCULATED.3IONS-SCNC: 8 MMOL/L (ref 7–19)
BUN SERPL-MCNC: 16 MG/DL (ref 6–20)
CALCIUM SERPL-MCNC: 8.3 MG/DL (ref 8.6–10)
CHLORIDE SERPL-SCNC: 92 MMOL/L (ref 98–111)
CO2 SERPL-SCNC: 35 MMOL/L (ref 22–29)
CREAT SERPL-MCNC: 1.4 MG/DL (ref 0.5–1.2)
GLUCOSE BLD-MCNC: 131 MG/DL (ref 70–99)
GLUCOSE BLD-MCNC: 154 MG/DL (ref 70–99)
GLUCOSE BLD-MCNC: 223 MG/DL (ref 70–99)
GLUCOSE BLD-MCNC: 94 MG/DL (ref 70–99)
GLUCOSE SERPL-MCNC: 147 MG/DL (ref 74–109)
PERFORMED ON: ABNORMAL
PERFORMED ON: NORMAL
POTASSIUM SERPL-SCNC: 4.3 MMOL/L (ref 3.5–5)
SODIUM SERPL-SCNC: 135 MMOL/L (ref 136–145)

## 2023-09-05 PROCEDURE — 80048 BASIC METABOLIC PNL TOTAL CA: CPT

## 2023-09-05 PROCEDURE — 97530 THERAPEUTIC ACTIVITIES: CPT

## 2023-09-05 PROCEDURE — 99232 SBSQ HOSP IP/OBS MODERATE 35: CPT | Performed by: PSYCHIATRY & NEUROLOGY

## 2023-09-05 PROCEDURE — 1180000000 HC REHAB R&B

## 2023-09-05 PROCEDURE — 36415 COLL VENOUS BLD VENIPUNCTURE: CPT

## 2023-09-05 PROCEDURE — 6370000000 HC RX 637 (ALT 250 FOR IP): Performed by: INTERNAL MEDICINE

## 2023-09-05 PROCEDURE — 97535 SELF CARE MNGMENT TRAINING: CPT

## 2023-09-05 PROCEDURE — 2500000003 HC RX 250 WO HCPCS: Performed by: INTERNAL MEDICINE

## 2023-09-05 PROCEDURE — 97116 GAIT TRAINING THERAPY: CPT

## 2023-09-05 PROCEDURE — 82962 GLUCOSE BLOOD TEST: CPT

## 2023-09-05 PROCEDURE — 6370000000 HC RX 637 (ALT 250 FOR IP): Performed by: PSYCHIATRY & NEUROLOGY

## 2023-09-05 PROCEDURE — 97110 THERAPEUTIC EXERCISES: CPT

## 2023-09-05 PROCEDURE — 2580000003 HC RX 258: Performed by: PSYCHIATRY & NEUROLOGY

## 2023-09-05 RX ORDER — BUMETANIDE 1 MG/1
1 TABLET ORAL 2 TIMES DAILY
Status: DISCONTINUED | OUTPATIENT
Start: 2023-09-05 | End: 2023-09-08

## 2023-09-05 RX ADMIN — MULTIPLE VITAMINS W/ MINERALS TAB 1 TABLET: TAB at 07:59

## 2023-09-05 RX ADMIN — BUMETANIDE 1 MG: 1 TABLET ORAL at 20:08

## 2023-09-05 RX ADMIN — SENNOSIDES AND DOCUSATE SODIUM 1 TABLET: 50; 8.6 TABLET ORAL at 08:00

## 2023-09-05 RX ADMIN — TRAMADOL HYDROCHLORIDE 50 MG: 50 TABLET, COATED ORAL at 20:08

## 2023-09-05 RX ADMIN — OXYCODONE HYDROCHLORIDE 5 MG: 5 TABLET ORAL at 10:24

## 2023-09-05 RX ADMIN — PREGABALIN 150 MG: 50 CAPSULE ORAL at 20:08

## 2023-09-05 RX ADMIN — PANTOPRAZOLE SODIUM 40 MG: 40 TABLET, DELAYED RELEASE ORAL at 07:59

## 2023-09-05 RX ADMIN — SENNOSIDES AND DOCUSATE SODIUM 1 TABLET: 50; 8.6 TABLET ORAL at 20:08

## 2023-09-05 RX ADMIN — TRAZODONE HYDROCHLORIDE 50 MG: 50 TABLET ORAL at 20:08

## 2023-09-05 RX ADMIN — INSULIN GLARGINE 20 UNITS: 100 INJECTION, SOLUTION SUBCUTANEOUS at 20:08

## 2023-09-05 RX ADMIN — APIXABAN 5 MG: 5 TABLET, FILM COATED ORAL at 08:00

## 2023-09-05 RX ADMIN — INSULIN LISPRO 8 UNITS: 100 INJECTION, SOLUTION INTRAVENOUS; SUBCUTANEOUS at 12:21

## 2023-09-05 RX ADMIN — PREGABALIN 150 MG: 50 CAPSULE ORAL at 07:59

## 2023-09-05 RX ADMIN — METOLAZONE 5 MG: 2.5 TABLET ORAL at 07:59

## 2023-09-05 RX ADMIN — LINACLOTIDE 145 MCG: 145 CAPSULE, GELATIN COATED ORAL at 08:07

## 2023-09-05 RX ADMIN — SODIUM CHLORIDE, PRESERVATIVE FREE 10 ML: 5 INJECTION INTRAVENOUS at 20:18

## 2023-09-05 RX ADMIN — SODIUM CHLORIDE, PRESERVATIVE FREE 10 ML: 5 INJECTION INTRAVENOUS at 08:03

## 2023-09-05 RX ADMIN — POLYETHYLENE GLYCOL 3350 17 G: 17 POWDER, FOR SOLUTION ORAL at 08:00

## 2023-09-05 RX ADMIN — OXYCODONE HYDROCHLORIDE 5 MG: 5 TABLET ORAL at 20:16

## 2023-09-05 RX ADMIN — BUMETANIDE 1 MG: 0.25 INJECTION INTRAMUSCULAR; INTRAVENOUS at 08:02

## 2023-09-05 RX ADMIN — INSULIN LISPRO 8 UNITS: 100 INJECTION, SOLUTION INTRAVENOUS; SUBCUTANEOUS at 08:03

## 2023-09-05 RX ADMIN — APIXABAN 5 MG: 5 TABLET, FILM COATED ORAL at 20:08

## 2023-09-05 ASSESSMENT — PAIN - FUNCTIONAL ASSESSMENT
PAIN_FUNCTIONAL_ASSESSMENT: ACTIVITIES ARE NOT PREVENTED
PAIN_FUNCTIONAL_ASSESSMENT: ACTIVITIES ARE NOT PREVENTED

## 2023-09-05 ASSESSMENT — PAIN SCALES - GENERAL
PAINLEVEL_OUTOF10: 6
PAINLEVEL_OUTOF10: 3
PAINLEVEL_OUTOF10: 3
PAINLEVEL_OUTOF10: 7

## 2023-09-05 ASSESSMENT — PAIN DESCRIPTION - LOCATION
LOCATION: BACK
LOCATION: LEG;BACK

## 2023-09-05 ASSESSMENT — PAIN DESCRIPTION - DESCRIPTORS
DESCRIPTORS: SHARP;SHOOTING
DESCRIPTORS: SHARP

## 2023-09-05 ASSESSMENT — PAIN DESCRIPTION - ORIENTATION
ORIENTATION: LOWER
ORIENTATION: RIGHT;LOWER

## 2023-09-05 NOTE — PROGRESS NOTES
Occupational Therapy  Facility/Department: [facility] 8 REHAB UNIT  Occupational Therapy Treatment Note    Name: Celena Krabbe  : 1970  MRN: [id number]  Date of Service: 2023    Discharge Recommendations:  Home with assist PRN, Home with Home health OT          Patient Diagnosis(es): There were no encounter diagnoses. Past Medical History:  has a past medical history of Anxiety, Depression, Intervertebral lumbar disc disorder with myelopathy, lumbar region, Neuropathy, Obstructive sleep apnea syndrome, Panic attacks, Primary hypertension, and Type 2 diabetes mellitus without complication, with long-term current use of insulin ([address]). Past Surgical History:  has a past surgical history that includes Colonoscopy (N/A, 2022). Treatment Diagnosis: right renalmass with s/p nephrectomy      Assessment   Performance deficits / Impairments: Decreased functional mobility ; Decreased ADL status; Decreased high-level IADLs;Decreased endurance;Decreased strength  Activity Tolerance  Activity Tolerance: Patient Tolerated treatment well        Plan   Occupational Therapy Plan  Specific Instructions for Next Treatment: AE training  Current Treatment Recommendations: Strengthening, Patient/Caregiver education & training, Balance training, Functional mobility training, Equipment evaluation, education, & procurement, Home management training, Endurance training, Safety education & training, Positioning, Self-Care / ADL     Restrictions  Restrictions/Precautions  Restrictions/Precautions: Fall Risk  Position Activity Restriction  Other position/activity restrictions: abdominal staples--gait belt to be worn high, severe scrotal edema with compression garment    Subjective   General  Chart Reviewed: Yes, Orders  Patient assessed for rehabilitation services?: Yes  Additional Pertinent Hx: DM with insulin therapy, HTN, chronic back pain, Depression  Family / Caregiver Present: No  Diagnosis: right renalmass with s/p

## 2023-09-05 NOTE — PROGRESS NOTES
Nephrology ([address] [facility]) Progress Note    Patient:  Krista Burgess  YOB: 1970  Date of Service: 9/5/2023  MRN: [id number]   Acct: [de-identified]   Primary Care Physician: Michele Ellis MD  Advance Directive: Full Code  Admit Date: 8/31/2023       Hospital Day: 5  Referring Provider: Sedrick Turner MD    Patient independently seen and examined, Chart, Consults, Notes, Operative notes, Labs, Cardiology, and Radiology studies reviewed as available. Subjective:  Krista Burgess is a 52 y.o. male for whom we were consulted for evaluation and treatment of increasing edema/anasarca. He was recently hospitalized at [facility] with nausea, vomiting and abdominal pain. 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 Urology has recommended to transfer him to tertiary care center. He was accepted to [facility] where he underwent right radical nephrectomy and renal caval thrombectomy. He tolerated procedure very well. Postprocedure, he was transferred to [facility] for rehab. However, he has noticed increasing edema of lower extremities and scrotal edema. His serum creatinine was 1.2 and nephrology was consulted for the treatment of edema and CKD. Otherwise, he had a history of type 2 diabetes, insulin-dependent, obesity, obstructive sleep apnea, hypertension and history of panic attack. Hospital course remarkable for administration of IV diuretics and he has excellent urine output. Today, no overnight events. Continues with good urine output and improving edema. Denied other complaints upon questioning but still with some weakness. Allergies:  Patient has no known allergies.     Medicines:  Current Facility-Administered Medications   Medication Dose Route Frequency Provider Last Rate Last Admin    linaclotide (LINZESS) capsule 145 mcg  145 mcg Oral QAM AC Sedrick Turner MD   145 mcg at 09/05/23 9639 ! +------------------------------------+----------+---------------+----------+ ! Mid Femoral                         !Yes       ! No             !          ! +------------------------------------+----------+---------------+----------+ ! Dist Femoral                        !Yes       ! No             !          ! +------------------------------------+----------+---------------+----------+ ! Deep Femoral                        !Yes       ! No             !          ! +------------------------------------+----------+---------------+----------+ ! Popliteal                           !Yes       ! No             !          ! +------------------------------------+----------+---------------+----------+ ! SSV                                 ! Yes       ! Yes            ! None      ! +------------------------------------+----------+---------------+----------+ ! Gastroc                             ! Yes       ! No             !          ! +------------------------------------+----------+---------------+----------+ ! PTV                                 ! Yes       ! No             !          ! +------------------------------------+----------+---------------+----------+ ! GSV                                 ! Yes       ! No             !          ! +------------------------------------+----------+---------------+----------+ ! ATV                                 ! Yes       ! Yes            ! None      ! +------------------------------------+----------+---------------+----------+ ! Peroneal                            !Yes       ! No             !          ! +------------------------------------+----------+---------------+----------+ ! Soleal                              !Yes       ! No             !          ! +------------------------------------+----------+---------------+----------+ Left Lower Extremities DVT Study Measurements Left 2D Measurements +------------------------------------+----------+---------------+----------+ ! Location

## 2023-09-05 NOTE — PLAN OF CARE
Problem: Safety - Adult  Goal: Free from fall injury  9/4/2023 2158 by Adia Levine RN  Outcome: Progressing  9/4/2023 1648 by Severa Lodge  Outcome: Progressing     Problem: ABCDS Injury Assessment  Goal: Absence of physical injury  9/4/2023 2158 by Adia Levine RN  Outcome: Progressing  9/4/2023 1648 by Severa Lodge  Outcome: Progressing     Problem: Skin/Tissue Integrity  Goal: Absence of new skin breakdown  Description: 1. Monitor for areas of redness and/or skin breakdown  2. Assess vascular access sites hourly  3. Every 4-6 hours minimum:  Change oxygen saturation probe site  4. Every 4-6 hours:  If on nasal continuous positive airway pressure, respiratory therapy assess nares and determine need for appliance change or resting period.   9/4/2023 2158 by Adia Levine RN  Outcome: Progressing  9/4/2023 1648 by Severa Lodge  Outcome: Progressing     Problem: Discharge Planning  Goal: Discharge to home or other facility with appropriate resources  9/4/2023 2158 by Adia Levine RN  Outcome: Progressing  Flowsheets (Taken 9/4/2023 2050)  Discharge to home or other facility with appropriate resources: Refer to discharge planning if patient needs post-hospital services based on physician order or complex needs related to functional status, cognitive ability or social support system  9/4/2023 1648 by Severa Lodge  Outcome: Progressing     Problem: Chronic Conditions and Co-morbidities  Goal: Patient's chronic conditions and co-morbidity symptoms are monitored and maintained or improved  9/4/2023 2158 by Adia Levine RN  Outcome: Progressing  [address] (Taken 9/4/2023 2050)  Care Plan - Patient's Chronic Conditions and Co-Morbidity Symptoms are Monitored and Maintained or Improved: Monitor and assess patient's chronic conditions and comorbid symptoms for stability, deterioration, or improvement  9/4/2023 1648 by Severa Lodge  Outcome: Progressing     Problem: Neurosensory - Adult  Goal: Achieves stable or improved neurological status  9/4/2023 2158 by Kathren Mcburney, RN  Outcome: Progressing  Flowsheets (Taken 9/4/2023 2050)  Achieves stable or improved neurological status: Assess for and report changes in neurological status  9/4/2023 1648 by Rustam Perez  Outcome: Progressing     Problem: Skin/Tissue Integrity - Adult  Goal: Incisions, wounds, or drain sites healing without S/S of infection  9/4/2023 2158 by Kathren Mcburney, RN  Outcome: Progressing  9/4/2023 1648 by Rustam Perez  Outcome: Progressing     Problem: Musculoskeletal - Adult  Goal: Return mobility to safest level of function  9/4/2023 2158 by Kathren Mcburney, RN  Outcome: Progressing  Flowsheets (Taken 9/4/2023 2050)  Return Mobility to Safest Level of Function: Assess patient stability and activity tolerance for standing, transferring and ambulating with or without assistive devices  9/4/2023 1648 by Rustam Perez  Outcome: Progressing     Problem: Gastrointestinal - Adult  Goal: Maintains or returns to baseline bowel function  9/4/2023 2158 by Kathren Mcburney, RN  Outcome: Progressing  [address] (Taken 9/4/2023 2050)  Maintains or returns to baseline bowel function: Assess bowel function  9/4/2023 1648 by Rustam Perez  Outcome: Progressing     Problem: Pain  Goal: Verbalizes/displays adequate comfort level or baseline comfort level  9/4/2023 2158 by Kathren Mcburney, RN  Outcome: Progressing  9/4/2023 1648 by Rustam Perez  Outcome: Progressing     Problem: Genitourinary - Adult  Goal: Absence of urinary retention  9/4/2023 2158 by Kathren Mcburney, RN  Outcome: Progressing  9/4/2023 1648 by Rustam Perez  Outcome: Progressing  Goal: Urinary catheter remains patent  9/4/2023 2158 by Kathren Mcburney, RN  Outcome: Progressing  9/4/2023 1648 by Rustam Perez  Outcome: Progressing

## 2023-09-05 NOTE — PROGRESS NOTES
Name: Genevieve Hernandez  MRN: [id number]  Date of Service:  9/5/2023 09/05/23 1300   Restrictions/Precautions   Restrictions/Precautions Fall Risk   Position Activity Restriction   Other position/activity restrictions abdominal staples--gait belt to be worn high, severe scrotal edema with compression garment   General   Chart Reviewed Yes   Patient assessed for rehabilitation services? Yes   Additional Pertinent Hx HYPERTENSION, ACUTE KIDNEY FAILURE, ACUTE EMBOLISM/THROMBOSIS OF LE, LI, OBESITY   Diagnosis 8/22 R RADICAL NEPHRECTOMY AND CAVAL THROMBECTOMY   Subjective   Subjective Pt laying in bed, agrees to participate in therapy. Subjective   Subjective Faces: RLE (due to sciatica) 5/10   Bed mobility   Supine to Sit Modified independent   Sit to Supine Modified independent   Scooting Modified independent   Bed Mobility Comments On L side of bed- use of bedrail   Transfers   Sit to Stand Supervision   Stand to Sit Supervision   Bed to Chair Stand by assistance   Ambulation   Surface Level tile   Device Rolling Walker   Other Apparatus Wheelchair follow  (Not necessary)   Assistance Stand by assistance   Quality of Gait wide GENEVA- improved vs previous txs, slight anterior lean when moving RW, toe-out gait, reciprocal pattern   Gait Deviations Slow Cherelle; Increased GENEVA; Decreased step length;Decreased step height   Distance 100', 125'   Comments Multiple L/R turns   Stairs/Curb   Stairs? Yes   Stairs   # Steps  3   Stairs Height 6\"   Rails Right ascending   Assistance Stand by assistance;Contact guard assistance   Comment step to pattern, pt able to recall correct BLE sequence when asked- did not perform   Activity Tolerance   Activity Tolerance Other (comment); Patient tolerated treatment well;Patient limited by endurance  (Swelling in B thighs/scrtoum when out of bed)   Assessment   Assessment Pt able to amb. w/ RW up to 100' then 125' (one at beginning of tx, one at end) requiring SBA and perform stair

## 2023-09-05 NOTE — PROGRESS NOTES
Occupational Therapy  Facility/Department: [facility] 8 REHAB UNIT  Occupational Therapy Treatment Note    Name: Nelia Atkins  : 1970  MRN: [id number]  Date of Service: 2023    Discharge Recommendations:  Home with assist PRN, Home with Home health OT          Patient Diagnosis(es): There were no encounter diagnoses. Past Medical History:  has a past medical history of Anxiety, Depression, Intervertebral lumbar disc disorder with myelopathy, lumbar region, Neuropathy, Obstructive sleep apnea syndrome, Panic attacks, Primary hypertension, and Type 2 diabetes mellitus without complication, with long-term current use of insulin ([address]). Past Surgical History:  has a past surgical history that includes Colonoscopy (N/A, 2022). Treatment Diagnosis: right renalmass with s/p nephrectomy      Assessment   Performance deficits / Impairments: Decreased functional mobility ; Decreased ADL status; Decreased high-level IADLs;Decreased endurance;Decreased strength  REQUIRES OT FOLLOW-UP: No  Activity Tolerance  Activity Tolerance: Treatment limited secondary to medical complications (free text)  Activity Tolerance Comments: limited due to edema/discomfort when upright        Plan   Occupational Therapy Plan  Specific Instructions for Next Treatment: AE training  Current Treatment Recommendations: Strengthening, Patient/Caregiver education & training, Balance training, Functional mobility training, Equipment evaluation, education, & procurement, Home management training, Endurance training, Safety education & training, Positioning, Self-Care / ADL     Restrictions  Restrictions/Precautions  Restrictions/Precautions: Fall Risk  Position Activity Restriction  Other position/activity restrictions: abdominal staples--gait belt to be worn high, severe scrotal edema with compression garment    Subjective   General  Chart Reviewed: Yes, Orders  Patient assessed for rehabilitation services?: Yes  Additional Pertinent Hx: DM with

## 2023-09-05 NOTE — PROGRESS NOTES
Name: Darryle Litten  MRN: [id number]  Date of Service:  9/5/2023 09/05/23 0900   Restrictions/Precautions   Restrictions/Precautions Fall Risk   Position Activity Restriction   Other position/activity restrictions abdominal staples--gait belt to be worn high, severe scrotal edema with compression garment   General   Chart Reviewed Yes   Patient assessed for rehabilitation services? Yes   Additional Pertinent Hx HYPERTENSION, ACUTE KIDNEY FAILURE, ACUTE EMBOLISM/THROMBOSIS OF LE, LI, OBESITY   Diagnosis 8/22 R RADICAL NEPHRECTOMY AND CAVAL THROMBECTOMY   Subjective   Subjective Pt laying in bed, agrees to participate in therapy. Subjective   Subjective Verbal: RLE (due to sciatica) 7/10 during activity   Bed mobility   Supine to Sit Modified independent   Sit to Supine Modified independent   Scooting Modified independent  (Up in bed by bridging some and BUE use on overhead rails)   Bed Mobility Comments On L side of bed- use of bedrail   Transfers   Sit to Stand Supervision   Stand to Sit Supervision   Bed to Chair Stand by assistance   Car Transfer Stand by assistance;Contact guard assistance  (2X- 1st trial car height lowered to simualte [place], 2nd trial car height higher to simulate truck + step (to simulate running board))   Ambulation   Surface Level tile   Device Rolling Walker   Assistance Stand by assistance   Quality of Gait wide GENEVA- improved vs previous txs, slight anterior lean when moving RW, toe-out gait, reciprocal pattern   Gait Deviations Slow Cherelle; Increased GENEVA; Decreased step length;Decreased step height   Distance 10' X 2   Comments Multiple L/R turns in room   Stairs/Curb   Stairs?  Yes   Stairs   # Steps  3   Stairs Height 6\"   Rails Right ascending;Bilateral   Assistance Stand by assistance;Contact guard assistance;Minimal assistance  (SBA for asc, CGA/Min A for desc)   Comment step to step pattern, 1X v/c's for correct sequence-  up leading w/ LLE and down leading w/ RLE   Activity

## 2023-09-05 NOTE — PLAN OF CARE
Problem: Safety - Adult  Goal: Free from fall injury  9/5/2023 1040 by Hebert Portillo LPN  Outcome: Progressing  9/4/2023 2158 by Valentino Grier RN  Outcome: Progressing     Problem: ABCDS Injury Assessment  Goal: Absence of physical injury  9/5/2023 1040 by Hebert Portillo LPN  Outcome: Progressing  9/4/2023 2158 by Valentino Grier RN  Outcome: Progressing     Problem: Skin/Tissue Integrity  Goal: Absence of new skin breakdown  Description: 1. Monitor for areas of redness and/or skin breakdown  2. Assess vascular access sites hourly  3. Every 4-6 hours minimum:  Change oxygen saturation probe site  4. Every 4-6 hours:  If on nasal continuous positive airway pressure, respiratory therapy assess nares and determine need for appliance change or resting period.   9/5/2023 1040 by Hebert Portillo LPN  Outcome: Progressing  9/4/2023 2158 by Valentino Grier RN  Outcome: Progressing     Problem: Discharge Planning  Goal: Discharge to home or other facility with appropriate resources  9/5/2023 1040 by Hebert Portillo LPN  Outcome: Progressing  Flowsheets (Taken 9/5/2023 5388)  Discharge to home or other facility with appropriate resources: Refer to discharge planning if patient needs post-hospital services based on physician order or complex needs related to functional status, cognitive ability or social support system  9/4/2023 2158 by Valentino Grier RN  Outcome: Progressing  Flowsheets (Taken 9/4/2023 2050)  Discharge to home or other facility with appropriate resources: Refer to discharge planning if patient needs post-hospital services based on physician order or complex needs related to functional status, cognitive ability or social support system     Problem: Chronic Conditions and Co-morbidities  Goal: Patient's chronic conditions and co-morbidity symptoms are monitored and maintained or improved  9/5/2023 1040 by Hebert Portillo LPN  Outcome: Progressing  Flowsheets (Taken 9/5/2023 0811)  Care Plan - Patient's Chronic Conditions and Co-Morbidity Symptoms are Monitored and Maintained or Improved: Monitor and assess patient's chronic conditions and comorbid symptoms for stability, deterioration, or improvement  9/4/2023 2158 by Zeina Chong RN  Outcome: Progressing  Flowsheets (Taken 9/4/2023 2050)  Care Plan - Patient's Chronic Conditions and Co-Morbidity Symptoms are Monitored and Maintained or Improved: Monitor and assess patient's chronic conditions and comorbid symptoms for stability, deterioration, or improvement     Problem: Neurosensory - Adult  Goal: Achieves stable or improved neurological status  9/5/2023 1040 by Joycelyn Morris LPN  Outcome: Progressing  Flowsheets (Taken 9/5/2023 0811)  Achieves stable or improved neurological status: Assess for and report changes in neurological status  9/4/2023 2158 by Zeina Chong RN  Outcome: Progressing  Flowsheets (Taken 9/4/2023 2050)  Achieves stable or improved neurological status: Assess for and report changes in neurological status     Problem: Skin/Tissue Integrity - Adult  Goal: Incisions, wounds, or drain sites healing without S/S of infection  9/5/2023 1040 by Joycelyn Morris LPN  Outcome: Progressing  9/4/2023 2158 by Zeina Chong RN  Outcome: Progressing     Problem: Musculoskeletal - Adult  Goal: Return mobility to safest level of function  9/5/2023 1040 by Joycelyn Morris LPN  Outcome: Progressing  Flowsheets (Taken 9/5/2023 1692)  Return Mobility to Safest Level of Function: Assess patient stability and activity tolerance for standing, transferring and ambulating with or without assistive devices  9/4/2023 2158 by Zeina Chong RN  Outcome: Progressing  Flowsheets (Taken 9/4/2023 2050)  Return Mobility to Safest Level of Function: Assess patient stability and activity tolerance for standing, transferring and ambulating with or without assistive devices     Problem: Gastrointestinal - Adult  Goal: Maintains or returns to baseline bowel

## 2023-09-06 ENCOUNTER — TELEPHONE (OUTPATIENT)
Dept: HEMATOLOGY | Age: 53
End: 2023-09-06

## 2023-09-06 DIAGNOSIS — C64.9 RENAL CELL CARCINOMA OF KIDNEY EXCLUDING RENAL PELVIS (HCC): Primary | ICD-10-CM

## 2023-09-06 LAB
ANION GAP SERPL CALCULATED.3IONS-SCNC: 10 MMOL/L (ref 7–19)
BUN SERPL-MCNC: 16 MG/DL (ref 6–20)
CALCIUM SERPL-MCNC: 8.5 MG/DL (ref 8.6–10)
CHLORIDE SERPL-SCNC: 90 MMOL/L (ref 98–111)
CO2 SERPL-SCNC: 34 MMOL/L (ref 22–29)
CREAT SERPL-MCNC: 1.4 MG/DL (ref 0.5–1.2)
GLUCOSE BLD-MCNC: 117 MG/DL (ref 70–99)
GLUCOSE BLD-MCNC: 139 MG/DL (ref 70–99)
GLUCOSE BLD-MCNC: 144 MG/DL (ref 70–99)
GLUCOSE BLD-MCNC: 86 MG/DL (ref 70–99)
GLUCOSE SERPL-MCNC: 139 MG/DL (ref 74–109)
PERFORMED ON: ABNORMAL
PERFORMED ON: NORMAL
POTASSIUM SERPL-SCNC: 4.1 MMOL/L (ref 3.5–5)
SODIUM SERPL-SCNC: 134 MMOL/L (ref 136–145)

## 2023-09-06 PROCEDURE — 97116 GAIT TRAINING THERAPY: CPT

## 2023-09-06 PROCEDURE — 99233 SBSQ HOSP IP/OBS HIGH 50: CPT | Performed by: PSYCHIATRY & NEUROLOGY

## 2023-09-06 PROCEDURE — 82962 GLUCOSE BLOOD TEST: CPT

## 2023-09-06 PROCEDURE — 6370000000 HC RX 637 (ALT 250 FOR IP): Performed by: PSYCHIATRY & NEUROLOGY

## 2023-09-06 PROCEDURE — 97110 THERAPEUTIC EXERCISES: CPT

## 2023-09-06 PROCEDURE — 94760 N-INVAS EAR/PLS OXIMETRY 1: CPT

## 2023-09-06 PROCEDURE — 80048 BASIC METABOLIC PNL TOTAL CA: CPT

## 2023-09-06 PROCEDURE — 97535 SELF CARE MNGMENT TRAINING: CPT

## 2023-09-06 PROCEDURE — 6370000000 HC RX 637 (ALT 250 FOR IP): Performed by: INTERNAL MEDICINE

## 2023-09-06 PROCEDURE — 2580000003 HC RX 258: Performed by: PSYCHIATRY & NEUROLOGY

## 2023-09-06 PROCEDURE — 1180000000 HC REHAB R&B

## 2023-09-06 PROCEDURE — 97530 THERAPEUTIC ACTIVITIES: CPT

## 2023-09-06 PROCEDURE — 36415 COLL VENOUS BLD VENIPUNCTURE: CPT

## 2023-09-06 RX ORDER — PREGABALIN 50 MG/1
100 CAPSULE ORAL 2 TIMES DAILY
Status: DISCONTINUED | OUTPATIENT
Start: 2023-09-06 | End: 2023-09-08 | Stop reason: HOSPADM

## 2023-09-06 RX ADMIN — BUMETANIDE 1 MG: 1 TABLET ORAL at 21:09

## 2023-09-06 RX ADMIN — SODIUM CHLORIDE, PRESERVATIVE FREE 10 ML: 5 INJECTION INTRAVENOUS at 08:03

## 2023-09-06 RX ADMIN — OXYCODONE HYDROCHLORIDE 5 MG: 5 TABLET ORAL at 21:08

## 2023-09-06 RX ADMIN — BUMETANIDE 1 MG: 1 TABLET ORAL at 08:02

## 2023-09-06 RX ADMIN — INSULIN LISPRO 8 UNITS: 100 INJECTION, SOLUTION INTRAVENOUS; SUBCUTANEOUS at 08:02

## 2023-09-06 RX ADMIN — PREGABALIN 150 MG: 50 CAPSULE ORAL at 08:02

## 2023-09-06 RX ADMIN — PREGABALIN 100 MG: 50 CAPSULE ORAL at 21:09

## 2023-09-06 RX ADMIN — MULTIPLE VITAMINS W/ MINERALS TAB 1 TABLET: TAB at 07:55

## 2023-09-06 RX ADMIN — OXYCODONE HYDROCHLORIDE 5 MG: 5 TABLET ORAL at 15:14

## 2023-09-06 RX ADMIN — APIXABAN 5 MG: 5 TABLET, FILM COATED ORAL at 07:55

## 2023-09-06 RX ADMIN — APIXABAN 5 MG: 5 TABLET, FILM COATED ORAL at 21:09

## 2023-09-06 RX ADMIN — INSULIN GLARGINE 20 UNITS: 100 INJECTION, SOLUTION SUBCUTANEOUS at 21:09

## 2023-09-06 RX ADMIN — INSULIN LISPRO 8 UNITS: 100 INJECTION, SOLUTION INTRAVENOUS; SUBCUTANEOUS at 17:17

## 2023-09-06 RX ADMIN — PANTOPRAZOLE SODIUM 40 MG: 40 TABLET, DELAYED RELEASE ORAL at 08:02

## 2023-09-06 RX ADMIN — SENNOSIDES AND DOCUSATE SODIUM 1 TABLET: 50; 8.6 TABLET ORAL at 07:55

## 2023-09-06 RX ADMIN — SENNOSIDES AND DOCUSATE SODIUM 1 TABLET: 50; 8.6 TABLET ORAL at 21:09

## 2023-09-06 RX ADMIN — INSULIN LISPRO 8 UNITS: 100 INJECTION, SOLUTION INTRAVENOUS; SUBCUTANEOUS at 12:02

## 2023-09-06 RX ADMIN — SODIUM CHLORIDE, PRESERVATIVE FREE 10 ML: 5 INJECTION INTRAVENOUS at 21:10

## 2023-09-06 RX ADMIN — TRAMADOL HYDROCHLORIDE 50 MG: 50 TABLET, COATED ORAL at 21:13

## 2023-09-06 RX ADMIN — TRAZODONE HYDROCHLORIDE 50 MG: 50 TABLET ORAL at 21:09

## 2023-09-06 ASSESSMENT — PAIN SCALES - GENERAL
PAINLEVEL_OUTOF10: 7
PAINLEVEL_OUTOF10: 0
PAINLEVEL_OUTOF10: 3
PAINLEVEL_OUTOF10: 3
PAINLEVEL_OUTOF10: 7
PAINLEVEL_OUTOF10: 7

## 2023-09-06 ASSESSMENT — PAIN DESCRIPTION - LOCATION
LOCATION: BACK;LEG
LOCATION: ABDOMEN;BACK;LEG
LOCATION: BACK;LEG

## 2023-09-06 ASSESSMENT — PAIN DESCRIPTION - DESCRIPTORS
DESCRIPTORS: ACHING
DESCRIPTORS: ACHING;DISCOMFORT
DESCRIPTORS: ACHING

## 2023-09-06 ASSESSMENT — PAIN DESCRIPTION - ORIENTATION
ORIENTATION: LOWER
ORIENTATION: LOWER;RIGHT
ORIENTATION: ANTERIOR;LOWER;RIGHT

## 2023-09-06 ASSESSMENT — PAIN - FUNCTIONAL ASSESSMENT
PAIN_FUNCTIONAL_ASSESSMENT: ACTIVITIES ARE NOT PREVENTED

## 2023-09-06 NOTE — PLAN OF CARE
Problem: Safety - Adult  Goal: Free from fall injury  9/5/2023 2248 by Sm[place] Hill LPN  Outcome: Progressing  9/5/2023 1040 by Carmina Bates LPN  Outcome: Progressing     Problem: ABCDS Injury Assessment  Goal: Absence of physical injury  9/5/2023 2248 by [place] Hill LPN  Outcome: Progressing  9/5/2023 1040 by Carmina Bates LPN  Outcome: Progressing     Problem: Skin/Tissue Integrity  Goal: Absence of new skin breakdown  Description: 1. Monitor for areas of redness and/or skin breakdown  2. Assess vascular access sites hourly  3. Every 4-6 hours minimum:  Change oxygen saturation probe site  4. Every 4-6 hours:  If on nasal continuous positive airway pressure, respiratory therapy assess nares and determine need for appliance change or resting period.   9/5/2023 2248 by [place] Hill LPN  Outcome: Progressing  9/5/2023 1040 by Carmina Bates LPN  Outcome: Progressing     Problem: Discharge Planning  Goal: Discharge to home or other facility with appropriate resources  9/5/2023 2248 by [place] LPN  Outcome: Progressing  9/5/2023 1040 by Carmina Bates LPN  Outcome: Progressing  Flowsheets (Taken 9/5/2023 8459)  Discharge to home or other facility with appropriate resources: Refer to discharge planning if patient needs post-hospital services based on physician order or complex needs related to functional status, cognitive ability or social support system     Problem: Chronic Conditions and Co-morbidities  Goal: Patient's chronic conditions and co-morbidity symptoms are monitored and maintained or improved  9/5/2023 2248 by [place] LPN  Outcome: Progressing  9/5/2023 1040 by Carmina Bates LPN  Outcome: Progressing  Flowsheets (Taken 9/5/2023 0811)  Care Plan - Patient's Chronic Conditions and Co-Morbidity Symptoms are Monitored and Maintained or Improved: Monitor and assess patient's chronic conditions and comorbid symptoms for stability, deterioration, or improvement

## 2023-09-06 NOTE — PLAN OF CARE
Problem: Safety - Adult  Goal: Free from fall injury  9/6/2023 1008 by Carlyle Nunez LPN  Outcome: Progressing  9/5/2023 2248 by Marcelino Mata LPN  Outcome: Progressing     Problem: ABCDS Injury Assessment  Goal: Absence of physical injury  9/6/2023 1008 by Carlyle Nunez LPN  Outcome: Progressing  9/5/2023 2248 by Marcelino Mata LPN  Outcome: Progressing     Problem: Skin/Tissue Integrity  Goal: Absence of new skin breakdown  Description: 1. Monitor for areas of redness and/or skin breakdown  2. Assess vascular access sites hourly  3. Every 4-6 hours minimum:  Change oxygen saturation probe site  4. Every 4-6 hours:  If on nasal continuous positive airway pressure, respiratory therapy assess nares and determine need for appliance change or resting period.   9/6/2023 1008 by Carlyle Nunez LPN  Outcome: Progressing  9/5/2023 2248 by Marcelino Mata LPN  Outcome: Progressing     Problem: Discharge Planning  Goal: Discharge to home or other facility with appropriate resources  9/6/2023 1008 by Carlyle Nunez LPN  Outcome: Progressing  Flowsheets (Taken 9/6/2023 0807)  Discharge to home or other facility with appropriate resources: Refer to discharge planning if patient needs post-hospital services based on physician order or complex needs related to functional status, cognitive ability or social support system  9/5/2023 2248 by Marcelino Mata LPN  Outcome: Progressing     Problem: Chronic Conditions and Co-morbidities  Goal: Patient's chronic conditions and co-morbidity symptoms are monitored and maintained or improved  9/6/2023 1008 by Carlyle Nunez LPN  Outcome: Progressing  Flowsheets (Taken 9/6/2023 0807)  Care Plan - Patient's Chronic Conditions and Co-Morbidity Symptoms are Monitored and Maintained or Improved: Monitor and assess patient's chronic conditions and comorbid symptoms for stability, deterioration, or improvement  9/5/2023 2248 by Marcelino Mata LPN  Outcome: Progressing

## 2023-09-06 NOTE — PROGRESS NOTES
COMPLAINT: Generalized weakness and deconditioning        HISTORY OF PRESENT ILLNESS:   This 52 y.o. male  ith past medical history of type 2 diabetes on insulin therapy, hypertension, hyperlipidemia, chronic back pain, LI with CPAP use, and depression who presented to [facility] ER on 8/15/23 with c/o bilateral lower extremity edema and pain. Patient reports approximately 6 weeks ago he began having symptoms of sour stomach, nausea, decreased appetite, and constant feeling of fullness. Patient denies having vomiting or diarrhea. Patient does report having darker than normal stools however has been taking Pepto-Bismol. Patient reports decrease in frequency of bowel movements. Patient denies abdominal pain. Patient reports he has been seen by his PCP and had his medications changed for his acid reflux as he attributed his symptoms to that. Patient reports 2 days ago he woke up with edema to his right leg. On presentation to ER he has edema in both legs. Patient reports bilateral lower extremity discomfort as well as difficulty walking. Patient reports he has only been urinating once a day for at least the past 3 days. Patient denies any obvious blood in his urine or difficulty urinating. Patient denies any recent injury. Patient does report decrease in activity recently related to fatigue and bilateral lower extremity edema. Work-up in ER revealed creatinine 2.9 previously 1.1, GFR 25, CK 31, bilirubin 1.3, WBC of 16.2. Venous ultrasound with preliminary findings of extensive DVTs in bilateral lower extremities. Vascular surgery was consulted from the ER and recommended CT abdomen and pelvis. Patient admitted to the hospitalist service for further evaluation. CT abdomen and pelvis indicates the right kidney is enlarged and there is perinephric fat stranding, focal prominence of the interpolar region in the right kidney measuring approximately 5.4 cm concerning for neoplasm.   Increased density in the meets criteria and is sufficiently stable to allow participation in the program. This requires an intensive level of therapy, close medical supervision, and an interdisciplinary team approach provided through an individualized plan of care. I approve admitting this patient for an intensive inpatient rehabilitation program.        Jaja Kirkpatrick.  Chetna Dumont MD       ____________________ _____________________ ________________   Physician Signature      Physician Name (print)   Physician NPI          Date

## 2023-09-06 NOTE — PROGRESS NOTES
Facility/Department: [facility] 8 REHAB UNIT  Occupational Therapy     Name: Genevieve Hernandez  : 1970  MRN: [id number]  Date of Service: 2023    Discharge Recommendations:  Home with assist PRN, Home with Home health OT    Patient Diagnosis(es): There were no encounter diagnoses. Past Medical History:  has a past medical history of Anxiety, Depression, Intervertebral lumbar disc disorder with myelopathy, lumbar region, Neuropathy, Obstructive sleep apnea syndrome, Panic attacks, Primary hypertension, and Type 2 diabetes mellitus without complication, with long-term current use of insulin ([address]). Past Surgical History:  has a past surgical history that includes Colonoscopy (N/A, 2022). Treatment Diagnosis: right renalmass with s/p nephrectomy    Assessment   Performance deficits / Impairments: Decreased functional mobility ; Decreased ADL status; Decreased high-level IADLs;Decreased endurance;Decreased strength  Treatment Diagnosis: right renalmass with s/p nephrectomy  Prognosis: Good  History: DM with insulin therapy, HTN, chronic back pain, Depression  Activity Tolerance  Activity Tolerance: Patient Tolerated treatment well     Plan   Occupational Therapy Plan  Specific Instructions for Next Treatment: AE training  Current Treatment Recommendations: Strengthening, Patient/Caregiver education & training, Balance training, Functional mobility training, Equipment evaluation, education, & procurement, Home management training, Endurance training, Safety education & training, Positioning, Self-Care / ADL     Restrictions  Restrictions/Precautions  Restrictions/Precautions: Fall Risk  Position Activity Restriction  Other position/activity restrictions: abdominal staples--gait belt to be worn high, severe scrotal edema with compression garment    Subjective   General  Chart Reviewed: Yes, Orders  Patient assessed for rehabilitation services?: Yes  Additional Pertinent Hx: DM with insulin therapy, HTN, chronic back

## 2023-09-06 NOTE — PROGRESS NOTES
Name: Mela Barreto  MRN: [id number]  Date of Service:  9/6/2023 09/06/23 1300   Restrictions/Precautions   Restrictions/Precautions Fall Risk   Position Activity Restriction   Other position/activity restrictions abdominal staples--gait belt to be worn high, severe scrotal edema with compression garment   General   Chart Reviewed Yes   Patient assessed for rehabilitation services? Yes   Additional Pertinent Hx HYPERTENSION, ACUTE KIDNEY FAILURE, ACUTE EMBOLISM/THROMBOSIS OF LE, LI, OBESITY   Diagnosis 8/22 R RADICAL NEPHRECTOMY AND CAVAL THROMBECTOMY   Follows Commands WFL   General Comment   Comments Pt able to sit on toilet and independently perform personal hygiene (urination). Pt requires Max A to assist w/ some personal hygiene involving BM. Subjective   Subjective Pt laying in bed, agrees to participate in therapy. Subjective   Subjective Faces: RLE (due to sciatica) 4/10   Pain Also some general dicomfort from scrotum/B thighs when amb. Bed mobility   Supine to Sit Modified independent   Sit to Supine Modified independent   Scooting Modified independent   Bed Mobility Comments On L side of bed- use of bedrail   Transfers   Sit to Stand Supervision;Modified independent   Stand to Sit Supervision;Modified independent   Bed to Chair Stand by assistance   Ambulation   Surface Level tile   Device Rollator   Assistance Stand by assistance   Quality of Gait wide GENEVA- improved greatly vs previous txs, slight anterior lean when moving RW, toe-out gait-improved greatly vs previous txs, reciprocal pattern   Gait Deviations Slow Cherelle; Increased GENEVA; Decreased step length;Decreased step height   Distance 50' X 2   Comments Multiple L/R turns, sudden stops   Ambulation 2   Surface - 2 level tile   Device 2 No device   Assistance 2 Contact guard assistance;Stand by assistance   Quality of Gait 2 same as previous, increased unsteadiness, intermittent use of BUE surfing on objects   Gait Deviations Slow

## 2023-09-06 NOTE — PROGRESS NOTES
Facility/Department: [facility] 8 REHAB UNIT  Occupational Therapy Treatment Note    Name: Alina Oakes  : 1970  MRN: [id number]  Date of Service: 2023    Discharge Recommendations:  Home with assist PRN, Home with Home health OT  OT Equipment Recommendations  Equipment Needed: No  Other: has BSC, RW, and built in shower seat       Patient Diagnosis(es): There were no encounter diagnoses. Past Medical History:  has a past medical history of Anxiety, Depression, Intervertebral lumbar disc disorder with myelopathy, lumbar region, Neuropathy, Obstructive sleep apnea syndrome, Panic attacks, Primary hypertension, and Type 2 diabetes mellitus without complication, with long-term current use of insulin ([address]). Past Surgical History:  has a past surgical history that includes Colonoscopy (N/A, 2022). Treatment Diagnosis: right renalmass with s/p nephrectomy      Assessment   Performance deficits / Impairments: Decreased functional mobility ; Decreased ADL status; Decreased high-level IADLs;Decreased endurance;Decreased strength  Treatment Diagnosis: right renalmass with s/p nephrectomy  Prognosis: Good  History: DM with insulin therapy, HTN, chronic back pain, Depression  Activity Tolerance  Activity Tolerance: Patient Tolerated treatment well            Plan   Occupational Therapy Plan  Specific Instructions for Next Treatment: AE, update LB dressing scores  Current Treatment Recommendations: Strengthening, Patient/Caregiver education & training, Balance training, Functional mobility training, Equipment evaluation, education, & procurement, Home management training, Endurance training, Safety education & training, Positioning, Self-Care / ADL     Restrictions  Restrictions/Precautions  Restrictions/Precautions: Fall Risk  Position Activity Restriction  Other position/activity restrictions: abdominal staples--gait belt to be worn high, severe scrotal edema with compression garment        Objective     Safety

## 2023-09-06 NOTE — PROGRESS NOTES
Name: Rachelle Wynn  MRN: [id number]  Date of Service:  9/6/2023 09/06/23 0815   Restrictions/Precautions   Restrictions/Precautions Fall Risk   Position Activity Restriction   Other position/activity restrictions abdominal staples--gait belt to be worn high, severe scrotal edema with compression garment   General   Chart Reviewed Yes   Patient assessed for rehabilitation services? Yes   Additional Pertinent Hx HYPERTENSION, ACUTE KIDNEY FAILURE, ACUTE EMBOLISM/THROMBOSIS OF LE, LI, OBESITY   Diagnosis 8/22 R RADICAL NEPHRECTOMY AND CAVAL THROMBECTOMY   General Comment   Comments Pt able to sit on toilet and independently perform personal hygiene (urination/BM)   Subjective   Subjective Pt laying in bed, agrees to participate in therapy. Subjective   Subjective Faces: RLE (due to sciatica) 5/10   Bed mobility   Supine to Sit Modified independent   Sit to Supine Modified independent   Scooting Modified independent   Bed Mobility Comments On L side of bed- use of bedrail   Transfers   Sit to Stand Supervision;Modified independent   Stand to Sit Supervision;Modified independent   Bed to Chair Stand by assistance   Ambulation   Surface Level tile   Device Rolling Walker   Other Apparatus Wheelchair follow   Assistance Stand by assistance   Quality of Gait wide GENEVA- improved greatly vs previous txs, slight anterior lean when moving RW, toe-out gait, reciprocal pattern   Gait Deviations Slow Cherelle; Increased GENEVA; Decreased step length;Decreased step height   Distance 15' X 2, 80', 100'   Comments Multiple L/R turns   Activity Tolerance   Activity Tolerance Other (comment); Patient tolerated treatment well;Patient limited by endurance  (BLE/scrotum swelling)   Assessment   Assessment Pt able to perform bed mobility w/ MI, and amb up to ~80' then ~100' requiring SBA- see above for specifics. Pt reports hx of back injury, and that he has amb. w/ a cane for awhile now.    Discharge Recommendations Continue to assess

## 2023-09-06 NOTE — PROGRESS NOTES
Nephrology ([facility]) Progress Note    Patient:  Amilcar Farias  YOB: 1970  Date of Service: 9/6/2023  MRN: [id number]   Acct: [de-identified]   Primary Care Physician: Raúl Cuellar MD  Advance Directive: Full Code  Admit Date: 8/31/2023       Hospital Day: 6  Referring Provider: Melissa Alan MD    Patient independently seen and examined, Chart, Consults, Notes, Operative notes, Labs, Cardiology, and Radiology studies reviewed as available. Subjective:  Amilcar Farias is a 52 y.o. male for whom we were consulted for evaluation and treatment of increasing edema/anasarca. He was recently hospitalized at [facility] with nausea, vomiting and abdominal pain. 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 Urology has recommended to transfer him to tertiary care center. He was accepted to [facility] where he underwent right radical nephrectomy and renal caval thrombectomy. He tolerated procedure very well. Postprocedure, he was transferred to [facility] for rehab. However, he has noticed increasing edema of lower extremities and scrotal edema. His serum creatinine was 1.2 and nephrology was consulted for the treatment of edema and CKD. Otherwise, he had a history of type 2 diabetes, insulin-dependent, obesity, obstructive sleep apnea, hypertension and history of panic attack. Hospital course remarkable for administration of IV diuretics and he has excellent urine output. Today, no overnight events. Continues with good urine output and improving edema. Denied other complaints upon questioning but still with some weakness. Questions about discharge planning and medication regimen noted and reviewed with patient. Allergies:  Patient has no known allergies.     Medicines:  Current Facility-Administered Medications   Medication Dose Route Frequency Provider Last Rate Last Admin    pregabalin

## 2023-09-06 NOTE — PROGRESS NOTES
Name: Hyacinth Andujar  MRN: [id number]  Date of Service:  9/6/2023 09/06/23 1300   Restrictions/Precautions   Restrictions/Precautions Fall Risk   Position Activity Restriction   Other position/activity restrictions abdominal staples--gait belt to be worn high, severe scrotal edema with compression garment   General   Chart Reviewed Yes   Patient assessed for rehabilitation services? Yes   Additional Pertinent Hx HYPERTENSION, ACUTE KIDNEY FAILURE, ACUTE EMBOLISM/THROMBOSIS OF LE, LI, OBESITY   Diagnosis 8/22 R RADICAL NEPHRECTOMY AND CAVAL THROMBECTOMY   Follows Commands WFL   General Comment   Comments Pt able to sit on toilet and independently perform personal hygiene (urination). Pt requires Max A to assist w/ some personal hygiene involving BM. Subjective   Subjective Pt laying in bed, agrees to participate in therapy. Subjective   Subjective Faces: RLE (due to sciatica) 4/10   Pain Also some general dicomfort from scrotum/B thighs when amb. Bed mobility   Supine to Sit Modified independent   Sit to Supine Modified independent   Scooting Modified independent   Bed Mobility Comments On L side of bed- use of bedrail   Transfers   Sit to Stand Supervision;Modified independent   Stand to Sit Supervision;Modified independent   Bed to Chair Stand by assistance   Ambulation   Surface Level tile   Device Rollator   Assistance Stand by assistance   Quality of Gait wide GENEVA- improved greatly vs previous txs, slight anterior lean when moving RW, toe-out gait-improved greatly vs previous txs, reciprocal pattern   Gait Deviations Slow Cherelle; Increased GENEVA; Decreased step length;Decreased step height   Distance 50' X 2   Comments Multiple L/R turns, sudden stops   Activity Tolerance   Activity Tolerance Other (comment); Patient tolerated treatment well  (Increased BLE/scrotum swelling when pt out of bed)   Assessment   Assessment Pt reports his friend has extra rollator he can borrow, and would like trial this afternoon- better for community outgoings, to amb. on outside terrain, and also has seat on for pt to rest when needed. Pt able to amb. up to 48' X 2 w/ rollator requiring SBA- see above for specifics.    Discharge Recommendations Continue to assess pending progress;Home with Home health PT;Home with assist PRN   Safety Devices   Type of Devices Patient at risk for falls;Gait belt;Left in bed;Call light within reach       Electronically signed by Nara Gonzalez PTA on 9/6/2023 at 3:53 PM

## 2023-09-07 ENCOUNTER — TELEPHONE (OUTPATIENT)
Dept: HEMATOLOGY | Age: 53
End: 2023-09-07

## 2023-09-07 DIAGNOSIS — C64.2 RENAL CELL CARCINOMA OF LEFT KIDNEY (HCC): Primary | ICD-10-CM

## 2023-09-07 LAB
ANION GAP SERPL CALCULATED.3IONS-SCNC: 11 MMOL/L (ref 7–19)
BASOPHILS # BLD: 0.1 K/UL (ref 0–0.2)
BASOPHILS NFR BLD: 1.3 % (ref 0–1)
BUN SERPL-MCNC: 19 MG/DL (ref 6–20)
CALCIUM SERPL-MCNC: 8.9 MG/DL (ref 8.6–10)
CHLORIDE SERPL-SCNC: 90 MMOL/L (ref 98–111)
CO2 SERPL-SCNC: 35 MMOL/L (ref 22–29)
CREAT SERPL-MCNC: 1.5 MG/DL (ref 0.5–1.2)
EOSINOPHIL # BLD: 0.4 K/UL (ref 0–0.6)
EOSINOPHIL NFR BLD: 7.7 % (ref 0–5)
ERYTHROCYTE [DISTWIDTH] IN BLOOD BY AUTOMATED COUNT: 19 % (ref 11.5–14.5)
GLUCOSE BLD-MCNC: 112 MG/DL (ref 70–99)
GLUCOSE BLD-MCNC: 118 MG/DL (ref 70–99)
GLUCOSE BLD-MCNC: 146 MG/DL (ref 70–99)
GLUCOSE BLD-MCNC: 178 MG/DL (ref 70–99)
GLUCOSE SERPL-MCNC: 120 MG/DL (ref 74–109)
HCT VFR BLD AUTO: 39.2 % (ref 42–52)
HGB BLD-MCNC: 11.6 G/DL (ref 14–18)
IMM GRANULOCYTES # BLD: 0.1 K/UL
LYMPHOCYTES # BLD: 1.6 K/UL (ref 1.1–4.5)
LYMPHOCYTES NFR BLD: 29.1 % (ref 20–40)
MCH RBC QN AUTO: 25.4 PG (ref 27–31)
MCHC RBC AUTO-ENTMCNC: 29.6 G/DL (ref 33–37)
MCV RBC AUTO: 86 FL (ref 80–94)
MONOCYTES # BLD: 0.6 K/UL (ref 0–0.9)
MONOCYTES NFR BLD: 9.8 % (ref 0–10)
NEUTROPHILS # BLD: 2.9 K/UL (ref 1.5–7.5)
NEUTS SEG NFR BLD: 51.2 % (ref 50–65)
PERFORMED ON: ABNORMAL
PLATELET # BLD AUTO: 235 K/UL (ref 130–400)
PMV BLD AUTO: 11 FL (ref 9.4–12.4)
POTASSIUM SERPL-SCNC: 4.1 MMOL/L (ref 3.5–5)
RBC # BLD AUTO: 4.56 M/UL (ref 4.7–6.1)
SODIUM SERPL-SCNC: 136 MMOL/L (ref 136–145)
WBC # BLD AUTO: 5.6 K/UL (ref 4.8–10.8)

## 2023-09-07 PROCEDURE — 99232 SBSQ HOSP IP/OBS MODERATE 35: CPT | Performed by: PSYCHIATRY & NEUROLOGY

## 2023-09-07 PROCEDURE — 82962 GLUCOSE BLOOD TEST: CPT

## 2023-09-07 PROCEDURE — 94760 N-INVAS EAR/PLS OXIMETRY 1: CPT

## 2023-09-07 PROCEDURE — 97535 SELF CARE MNGMENT TRAINING: CPT

## 2023-09-07 PROCEDURE — 97530 THERAPEUTIC ACTIVITIES: CPT

## 2023-09-07 PROCEDURE — 97116 GAIT TRAINING THERAPY: CPT

## 2023-09-07 PROCEDURE — 2580000003 HC RX 258: Performed by: PSYCHIATRY & NEUROLOGY

## 2023-09-07 PROCEDURE — 1180000000 HC REHAB R&B

## 2023-09-07 PROCEDURE — 99231 SBSQ HOSP IP/OBS SF/LOW 25: CPT | Performed by: INTERNAL MEDICINE

## 2023-09-07 PROCEDURE — 80048 BASIC METABOLIC PNL TOTAL CA: CPT

## 2023-09-07 PROCEDURE — 97110 THERAPEUTIC EXERCISES: CPT

## 2023-09-07 PROCEDURE — 6370000000 HC RX 637 (ALT 250 FOR IP): Performed by: INTERNAL MEDICINE

## 2023-09-07 PROCEDURE — 36415 COLL VENOUS BLD VENIPUNCTURE: CPT

## 2023-09-07 PROCEDURE — 85025 COMPLETE CBC W/AUTO DIFF WBC: CPT

## 2023-09-07 PROCEDURE — 6370000000 HC RX 637 (ALT 250 FOR IP): Performed by: PSYCHIATRY & NEUROLOGY

## 2023-09-07 RX ORDER — ATORVASTATIN CALCIUM 10 MG/1
10 TABLET, FILM COATED ORAL NIGHTLY
Status: DISCONTINUED | OUTPATIENT
Start: 2023-09-07 | End: 2023-09-08 | Stop reason: HOSPADM

## 2023-09-07 RX ADMIN — INSULIN LISPRO 8 UNITS: 100 INJECTION, SOLUTION INTRAVENOUS; SUBCUTANEOUS at 12:14

## 2023-09-07 RX ADMIN — PREGABALIN 100 MG: 50 CAPSULE ORAL at 21:24

## 2023-09-07 RX ADMIN — TRAZODONE HYDROCHLORIDE 50 MG: 50 TABLET ORAL at 21:25

## 2023-09-07 RX ADMIN — PANTOPRAZOLE SODIUM 40 MG: 40 TABLET, DELAYED RELEASE ORAL at 08:06

## 2023-09-07 RX ADMIN — PREGABALIN 100 MG: 50 CAPSULE ORAL at 08:06

## 2023-09-07 RX ADMIN — APIXABAN 5 MG: 5 TABLET, FILM COATED ORAL at 21:25

## 2023-09-07 RX ADMIN — SENNOSIDES AND DOCUSATE SODIUM 1 TABLET: 50; 8.6 TABLET ORAL at 21:25

## 2023-09-07 RX ADMIN — BUMETANIDE 1 MG: 1 TABLET ORAL at 21:25

## 2023-09-07 RX ADMIN — OXYCODONE HYDROCHLORIDE 5 MG: 5 TABLET ORAL at 21:24

## 2023-09-07 RX ADMIN — ATORVASTATIN CALCIUM 10 MG: 10 TABLET, FILM COATED ORAL at 21:25

## 2023-09-07 RX ADMIN — BUMETANIDE 1 MG: 1 TABLET ORAL at 08:06

## 2023-09-07 RX ADMIN — SODIUM CHLORIDE, PRESERVATIVE FREE 10 ML: 5 INJECTION INTRAVENOUS at 08:07

## 2023-09-07 RX ADMIN — INSULIN GLARGINE 20 UNITS: 100 INJECTION, SOLUTION SUBCUTANEOUS at 21:26

## 2023-09-07 RX ADMIN — INSULIN LISPRO 8 UNITS: 100 INJECTION, SOLUTION INTRAVENOUS; SUBCUTANEOUS at 17:33

## 2023-09-07 RX ADMIN — SODIUM CHLORIDE, PRESERVATIVE FREE 10 ML: 5 INJECTION INTRAVENOUS at 21:27

## 2023-09-07 RX ADMIN — SENNOSIDES AND DOCUSATE SODIUM 1 TABLET: 50; 8.6 TABLET ORAL at 08:06

## 2023-09-07 RX ADMIN — APIXABAN 5 MG: 5 TABLET, FILM COATED ORAL at 08:06

## 2023-09-07 RX ADMIN — INSULIN LISPRO 8 UNITS: 100 INJECTION, SOLUTION INTRAVENOUS; SUBCUTANEOUS at 08:06

## 2023-09-07 RX ADMIN — MULTIPLE VITAMINS W/ MINERALS TAB 1 TABLET: TAB at 08:06

## 2023-09-07 RX ADMIN — LINACLOTIDE 145 MCG: 145 CAPSULE, GELATIN COATED ORAL at 05:44

## 2023-09-07 RX ADMIN — OXYCODONE HYDROCHLORIDE 5 MG: 5 TABLET ORAL at 17:36

## 2023-09-07 ASSESSMENT — PAIN DESCRIPTION - LOCATION
LOCATION: LEG
LOCATION: BACK;ABDOMEN

## 2023-09-07 ASSESSMENT — PAIN DESCRIPTION - ORIENTATION
ORIENTATION: RIGHT
ORIENTATION: LOWER;ANTERIOR

## 2023-09-07 ASSESSMENT — PAIN DESCRIPTION - DESCRIPTORS
DESCRIPTORS: ACHING
DESCRIPTORS: SHARP

## 2023-09-07 ASSESSMENT — PAIN SCALES - GENERAL
PAINLEVEL_OUTOF10: 7
PAINLEVEL_OUTOF10: 3
PAINLEVEL_OUTOF10: 6
PAINLEVEL_OUTOF10: 0
PAINLEVEL_OUTOF10: 3

## 2023-09-07 NOTE — PROGRESS NOTES
Progress Note    Patient name: Krista Burgess  Patient : 1970  MR #[id number]  Room: 0    Portions of this note have been copied forward, however, changed to reflect the most current clinical status of this patient. Subjective: feeling better, getting stronger. Anticipating discharge home soon. HISTORY OF PRESENT ILLNESS:  The patient is known to me from prior hospitalization. He had a large left kidney mass with tumor thrombus extension into the IVC. He was transferred to [facility] and is currently status post left radical nephrectomy. He was hospitalized here at the rehab for further strengthening. I was consulted for continuity of care. Diagnosis  Renal cell carcinoma, clear-cell, Aug 2023  Provoked bilateral lower extremity DVT  [id number]      Treatment summary  UFH heparin-> Eliquis  2023-right nephrectomy with IVC thrombectomy         Cancer history  Krista Burgess was first seen by me on 8/15/2023 during patient was positioned [facility].  The patient has a history of type 2 diabetes, hypertension hyperlipidemia, chronic back pain, LI with CPAP, history of depression. He presented to the ER department at [facility] complaints of bilateral lower extremity swelling for about 6 weeks. This is getting progressively worse. In addition, complains of right flank pain. Patient denies any hematuria. Patient reports increased fatigue. Laboratory work-up in the emergency remarkable for neutrophil leukocytosis, elevated creatinine/decreased GFR, mild elevation of bilirubin. Imaging studies as below  8/15/2023-CT abdomen/pelvis without contrast showed:  Liver: Grossly unremarkable. Biliary: Gallstones are seen. No bile duct dilatation. Spleen: No splenomegaly. Pancreas: Unremarkable. Adrenals: No mass. Kidneys: The right kidney is enlarged. There is right perinephric fat stranding.   Focal prominence of the interpolar region of the right kidney is voice recognition program. Efforts were made to edit the dictations but occasionally words are mis-transcribed.)    Roseanne Pineda, APRN  09/07/23  6:38 AM    Physician's attestation/substantial contribution:  I, Dr Gretel Hunt, independently performed an evaluation on this patient. I have reviewed relevant medical information/data to include but not limited to medication list, relevant appropriate labs and imaging when applicable. I reviewed other physician's notes, ancillary services and nurses assessment. I have reviewed the above documentation completed by the Nurse Practitioner or Physician Assistant. Please see my additional contributions to the history of present illness, physical examination, and assessment/medical decision-making that reflect my findings and impressions. I have seen and examined the patient and the key elements of all parts of the encounter have been performed by me. I agree with the assessment and plan as outlined by the ARNP/PA. Subjective-no new complaints  Objective-no change  Assessment/plan:   Renal cell carcinoma left- stage III pT3No, G4.    Will plan for [facility] as outpatient

## 2023-09-07 NOTE — PLAN OF CARE
Problem: Safety - Adult  Goal: Free from fall injury  9/7/2023 1033 by Jose Montes LPN  Outcome: Progressing  9/6/2023 2238 by Dewayne Aponte LPN  Outcome: Progressing     Problem: ABCDS Injury Assessment  Goal: Absence of physical injury  9/7/2023 1033 by Jose Montes LPN  Outcome: Progressing  9/6/2023 2238 by Dewayne Aponte LPN  Outcome: Progressing     Problem: Skin/Tissue Integrity  Goal: Absence of new skin breakdown  Description: 1. Monitor for areas of redness and/or skin breakdown  2. Assess vascular access sites hourly  3. Every 4-6 hours minimum:  Change oxygen saturation probe site  4. Every 4-6 hours:  If on nasal continuous positive airway pressure, respiratory therapy assess nares and determine need for appliance change or resting period.   9/7/2023 1033 by Jose Montes LPN  Outcome: Progressing  9/6/2023 2238 by Dewayne Aponte LPN  Outcome: Progressing     Problem: Discharge Planning  Goal: Discharge to home or other facility with appropriate resources  9/7/2023 1033 by Jose Montes LPN  Outcome: Progressing  Flowsheets (Taken 9/7/2023 0813)  Discharge to home or other facility with appropriate resources: Refer to discharge planning if patient needs post-hospital services based on physician order or complex needs related to functional status, cognitive ability or social support system  9/6/2023 2238 by Dewayne Aponte LPN  Outcome: Progressing     Problem: Chronic Conditions and Co-morbidities  Goal: Patient's chronic conditions and co-morbidity symptoms are monitored and maintained or improved  9/7/2023 1033 by Jose Montes LPN  Outcome: Progressing  Flowsheets (Taken 9/7/2023 0813)  Care Plan - Patient's Chronic Conditions and Co-Morbidity Symptoms are Monitored and Maintained or Improved: Monitor and assess patient's chronic conditions and comorbid symptoms for stability, deterioration, or improvement  9/6/2023 2238 by Dewayne Aponte LPN  Outcome: Progressing

## 2023-09-07 NOTE — PROGRESS NOTES
Facility/Department: [facility] 8 REHAB UNIT  Occupational Therapy     Name: Kisha Ocampo  : 1970  MRN: [id number]  Date of Service: 2023    Discharge Recommendations:  Home with assist PRN, Home with Home health OT    Patient Diagnosis(es): There were no encounter diagnoses. Past Medical History:  has a past medical history of Anxiety, Depression, Intervertebral lumbar disc disorder with myelopathy, lumbar region, Neuropathy, Obstructive sleep apnea syndrome, Panic attacks, Primary hypertension, and Type 2 diabetes mellitus without complication, with long-term current use of insulin ([address]). Past Surgical History:  has a past surgical history that includes Colonoscopy (N/A, 2022). Treatment Diagnosis: right renalmass with s/p nephrectomy    Assessment   Performance deficits / Impairments: Decreased functional mobility ; Decreased high-level IADLs;Decreased endurance;Decreased strength  Treatment Diagnosis: right renalmass with s/p nephrectomy  Prognosis: Good  History: DM with insulin therapy, HTN, chronic back pain, Depression  Activity Tolerance  Activity Tolerance: Patient Tolerated treatment well     Plan   Occupational Therapy Plan  Specific Instructions for Next Treatment: AE, update LB dressing scores  Current Treatment Recommendations: Strengthening, Patient/Caregiver education & training, Balance training, Functional mobility training, Equipment evaluation, education, & procurement, Home management training, Endurance training, Safety education & training, Positioning, Self-Care / ADL     Restrictions  Restrictions/Precautions  Restrictions/Precautions: Fall Risk  Position Activity Restriction  Other position/activity restrictions: abdominal staples--gait belt to be worn high, severe scrotal edema with compression garment    Subjective   General  Chart Reviewed: Yes, Orders  Patient assessed for rehabilitation services?: Yes  Additional Pertinent Hx: DM with insulin therapy, HTN, chronic back pain, Depression  Family / Caregiver Present: No  Diagnosis: right renalmass with s/p nephrectomy  Subjective  Subjective: Pt. stated that he is hoping to discharge by the end of the week. Social/Functional History  Social/Functional History  Lives With: Alone, Other (comment)  Type of Home: House  Home Layout: One level  Home Access: Stairs to enter with rails  Entrance Stairs - Number of Steps: 3  Entrance Stairs - Rails: Right  Bathroom Shower/Tub: Walk-in shower  Bathroom Toilet:  (comfort height)  Home Equipment: Cane  Has the patient had two or more falls in the past year or any fall with injury in the past year?: No  ADL Assistance: Needs assistance  Dressing: Minimal assistance  Ambulation Assistance: Independent (with cane)  Transfer Assistance: Independent  Additional Comments: stated needing occasional assistance for pants--pt very sleepy during session--not sure of reliabilty of responses about prior level of function    Objective   Functional Mobility  Functional - Mobility Device: 4-Wheeled Walker  Activity: Other, Retrieve items, Transport items  Assist Level: Supervision  Functional Mobility Comments: Simple food prep task in kitchen, utilized counters at times for balance. Ambulated in halls, and pt. room. Pulse: 76  Heart Rate Source: Monitor  BP: 102/68  BP Location: Right upper arm  Patient Position: Supine  MAP (Calculated): 79  Respirations: 20  SpO2: 92 %    Safety Devices  Type of Devices: Call light within reach; Bed alarm in place    Activity Tolerance  Activity Tolerance: Other (comment); Patient tolerated treatment well;Patient limited by endurance (Increased BLE/scrotum swelling when out of bed)    Transfers  Sit to stand: Supervision  Stand to sit: Supervision    Education  Education Given To: Patient  Education Provided: Transfer Training, Mobility Training, IADL Function  Education Provided Comments: Supine  Education Method: Verbal, Demonstration  Barriers to Learning: None  Education

## 2023-09-07 NOTE — PLAN OF CARE
Problem: Safety - Adult  Goal: Free from fall injury  9/6/2023 2238 by Sheryle Goldberg, LPN  Outcome: Progressing  9/6/2023 1008 by Florencia Rodriguez LPN  Outcome: Progressing     Problem: ABCDS Injury Assessment  Goal: Absence of physical injury  9/6/2023 2238 by Sheryle Goldberg, LPN  Outcome: Progressing  9/6/2023 1008 by Florencia Rodriguez LPN  Outcome: Progressing     Problem: Skin/Tissue Integrity  Goal: Absence of new skin breakdown  Description: 1. Monitor for areas of redness and/or skin breakdown  2. Assess vascular access sites hourly  3. Every 4-6 hours minimum:  Change oxygen saturation probe site  4. Every 4-6 hours:  If on nasal continuous positive airway pressure, respiratory therapy assess nares and determine need for appliance change or resting period.   9/6/2023 2238 by Sheryle Goldberg, LPN  Outcome: Progressing  9/6/2023 1008 by Florencia Rodriguez LPN  Outcome: Progressing     Problem: Discharge Planning  Goal: Discharge to home or other facility with appropriate resources  9/6/2023 2238 by Sheryle Goldberg, LPN  Outcome: Progressing  9/6/2023 1008 by Florencia Rodriguez LPN  Outcome: Progressing  Flowsheets (Taken 9/6/2023 0807)  Discharge to home or other facility with appropriate resources: Refer to discharge planning if patient needs post-hospital services based on physician order or complex needs related to functional status, cognitive ability or social support system     Problem: Chronic Conditions and Co-morbidities  Goal: Patient's chronic conditions and co-morbidity symptoms are monitored and maintained or improved  9/6/2023 2238 by Sheryle Goldberg, LPN  Outcome: Progressing  9/6/2023 1008 by Florencia Rodriguez LPN  Outcome: Progressing  Flowsheets (Taken 9/6/2023 0807)  Care Plan - Patient's Chronic Conditions and Co-Morbidity Symptoms are Monitored and Maintained or Improved: Monitor and assess patient's chronic conditions and comorbid symptoms for stability, deterioration, or improvement

## 2023-09-07 NOTE — PROGRESS NOTES
Facility/Department: [facility] 8 REHAB UNIT  Occupational Therapy Treatment Note    Name: Rommel Feliz  : 1970  MRN: [id number]  Date of Service: 2023    Discharge Recommendations:  Home with assist PRN, Home with Home health OT          Patient Diagnosis(es): There were no encounter diagnoses. Past Medical History:  has a past medical history of Anxiety, Depression, Intervertebral lumbar disc disorder with myelopathy, lumbar region, Neuropathy, Obstructive sleep apnea syndrome, Panic attacks, Primary hypertension, and Type 2 diabetes mellitus without complication, with long-term current use of insulin ([address]). Past Surgical History:  has a past surgical history that includes Colonoscopy (N/A, 2022). Treatment Diagnosis: right renalmass with s/p nephrectomy       23 0815   Assessment   Performance deficits / Impairments Decreased functional mobility ; Decreased high-level IADLs;Decreased endurance;Decreased strength     Plan   Occupational Therapy Plan  Specific Instructions for Next Treatment: AE, update LB dressing scores  Current Treatment Recommendations: Strengthening, Patient/Caregiver education & training, Balance training, Functional mobility training, Equipment evaluation, education, & procurement, Home management training, Endurance training, Safety education & training, Positioning, Self-Care / ADL     Restrictions  Restrictions/Precautions  Restrictions/Precautions: Fall Risk  Position Activity Restriction  Other position/activity restrictions: abdominal staples--gait belt to be worn high, severe scrotal edema with compression garment     23 0815   General   Family / Caregiver Present No   Pre Treatment Pain Screening   Pain at present 0   Scale Used Numeric Score        23 0815   Safety Devices   Type of Devices Left in bed;Call light within reach; Bed alarm in place       Objective     Bed mobility  Supine to Sit: Modified independent  Sit to Supine: Modified independent  Transfers  Sit to stand: Supervision  Stand to sit: Supervision          Education  Education Given To: Patient  Education Provided:  Mobility Training, Transfer Training  Education Provided Comments: AE for LB dressing  Education Method: Verbal, Demonstration  Barriers to Learning: None  Education Outcome: Verbalized understanding       [address] needed: Supervision or touching assistance  Comment: supervision  CARE Score: 4  Discharge Goal: Independent      Toilet Transfer  Assistance needed: Supervision or touching assistance  Comment: supervision with rollator  CARE Score: 4  Discharge Goal: Independent    Balance  Sitting Balance: Independent  Standing Balance: Supervision           Eating  Assistance Needed: Independent  CARE Score: 6  Discharge Goal: Independent    Oral Hygiene  Assistance Needed: Independent  CARE Score: 6  Discharge Goal: Independent      Shower/Bathe Self  Assistance Needed: Supervision or touching assistance  Comment: supervision for shower  CARE Score: 4  Discharge Goal: Independent    Upper Body Dressing  Assistance Needed: Independent  CARE Score: 6  Discharge Goal: Independent      Lower Body Dressing  Assistance Needed: Supervision or touching assistance  Comment: supervision with AE  CARE Score: 4  Discharge Goal: Independent    Putting On/Taking Off Footwear  Assistance Needed: Partial/moderate assistance  Comment: limited by edema---anticipate more independence with AE when edema resolves or when using larger sock aid  CARE Score: 3  Discharge Goal: Independent      Goals  Short Term Goals  Time Frame for Short Term Goals: 1 week  Short Term Goal 1: complete LB dressing with AE with supervision  Short Term Goal 2: complete 1-2 handed standing level tasks for 3 mins with supervision  Short Term Goal 3: complete overall toileting with supervision  Short Term Goal 4: complete overall bathing with LH sponge with supervision  Short Term Goal 5: complete

## 2023-09-07 NOTE — PROGRESS NOTES
Patient:   Donell Palmer  MR#:    [id number]   Room:    [facility]822-   YOB: 1970  Date of Progress Note: 9/7/2023  Time of Note                           9:34 AM  Consulting Physician:   Dieudonne Rivers M.D. Attending Physician:  Dieudonne Rivers MD     CHIEF COMPLAINT: Generalized weakness and deconditioning     Subjective: This 52 y.o. male  ith past medical history of type 2 diabetes on insulin therapy, hypertension, hyperlipidemia, chronic back pain, LI with CPAP use, and depression who presented to [facility] ER on 8/15/23 with c/o bilateral lower extremity edema and pain. Patient reports approximately 6 weeks ago he began having symptoms of sour stomach, nausea, decreased appetite, and constant feeling of fullness. Patient denies having vomiting or diarrhea. Patient does report having darker than normal stools however has been taking Pepto-Bismol. Patient reports decrease in frequency of bowel movements. Patient denies abdominal pain. Patient reports he has been seen by his PCP and had his medications changed for his acid reflux as he attributed his symptoms to that. Patient reports 2 days ago he woke up with edema to his right leg. On presentation to ER he has edema in both legs. Patient reports bilateral lower extremity discomfort as well as difficulty walking. Patient reports he has only been urinating once a day for at least the past 3 days. Patient denies any obvious blood in his urine or difficulty urinating. Patient denies any recent injury. Patient does report decrease in activity recently related to fatigue and bilateral lower extremity edema. Work-up in ER revealed creatinine 2.9 previously 1.1, GFR 25, CK 31, bilirubin 1.3, WBC of 16.2. Venous ultrasound with preliminary findings of extensive DVTs in bilateral lower extremities. Vascular surgery was consulted from the ER and recommended CT abdomen and pelvis. Patient admitted to the hospitalist service for further evaluation.   CT ASSESSMENT:  Assessment: Pt able to amb. w/ RW up to 100' then 125' (one at beginning of tx, one at end) requiring SBA and perform stair training requiring SBA/CGA, requests to lay back down post tx. Pt overall swelling has decreased (wide GENEVA during gait has greatly decreased), pt continues to be unable to tolerate being out of bed very long due to increased swelling once he is up.       SPEECH THERAPY        OCCUPATIONAL THERAPY  Cognitive Patterns:  Cognitive Pattern Assessment Used: BIMS  Cognitive Assessment Method (CAM):  Confusion Assessment Method (CAM)  Is there evidence of an acute change in mental status from the patient's baseline?: No  Inattention: Behavior not present  Disorganized thinking: Behavior not present  Altered level of consciousness: Behavior not present  Health Literacy:  How often do you need to have someone help you when you read instructions, pamphlets, or other written material from your doctor or pharmacy?: Never           CURRENT IRF-MICHAEL SCORES  Eating: CARE Score: 6     Oral Hygiene: CARE Score: 5         Toileting: CARE Score: 3  Comment: mod, hygiene      Shower/Bathe: CARE Score: 4  Comment: SBA with GB, LH sponge        Upper Body Dressing: CARE Score: 5          Lower Body Dressing: CARE Score: 2  Comment: without ae        Footwear: CARE Score: 1  Comment: limited by edema        Toilet Transfers: CARE Score: 4  Comment: SBA with RW        Picking Up Object:  CARE Score: 1           UE Functioning:  WFLs  Pain Assessment:  7/10  back     STGs:  Short Term Goals  Time Frame for Short Term Goals: 1 week  Short Term Goal 1: complete LB dressing with AE with supervision  Short Term Goal 2: complete 1-2 handed standing level tasks for 3 mins with supervision  Short Term Goal 3: complete overall toileting with supervision  Short Term Goal 4: complete overall bathing with LH sponge with supervision  Short Term Goal 5: complete simple ambulatory home making task with supervision

## 2023-09-07 NOTE — PROGRESS NOTES
Name: Lars Hernandez  MRN: [id number]  Date of Service:  9/7/2023 09/07/23 1000   Restrictions/Precautions   Restrictions/Precautions Fall Risk   Position Activity Restriction   Other position/activity restrictions abdominal staples--gait belt to be worn high, severe scrotal edema with compression garment   General   Chart Reviewed Yes   Patient assessed for rehabilitation services? Yes   Additional Pertinent Hx HYPERTENSION, ACUTE KIDNEY FAILURE, ACUTE EMBOLISM/THROMBOSIS OF LE, LI, OBESITY   Diagnosis 8/22 R RADICAL NEPHRECTOMY AND CAVAL THROMBECTOMY   Follows Commands WFL   Subjective   Subjective Pt laying in bed, agrees to participate in therapy. Subjective   Subjective Verbal: RLE (due to sciatica) 4/10 and discomfort due to swelling in BLE/scrotum   Bed mobility   Supine to Sit Modified independent   Scooting Independent   Bed Mobility Comments On L side of bed- use of bedrail   Transfers   Sit to Stand Modified independent; Independent   Stand to Sit Modified independent; Independent   Bed to Chair Stand by assistance   Car Transfer Supervision;Modified independent  (Low setting)   Ambulation   Surface Level tile   Device Single point cane  (R/L hand)   Other Apparatus Wheelchair follow   Assistance Stand by assistance;Contact guard assistance   Quality of Gait wide GENEVA- improved greatly vs previous txs, slight anterior lean when moving RW, toe-out gait-improved greatly vs previous txs, reciprocal pattern   Gait Deviations Slow Cherelle; Increased GENEVA; Decreased step length;Decreased step height   Distance 10', 20', 50'   Comments Multiple L/R turns   Stairs/Curb   Stairs?  Yes   Stairs   # Steps  6   Stairs Height 6\"   Rails Right ascending  (And use of L/R SPC)   Assistance Stand by assistance   Comment step to pattern, 1X v/c's for reminder of correct BLE sequencing and for sequencing of L/R [facility]   Wheelchair Activities   Propulsion Yes   Propulsion 1   Propulsion Manual   Level of Assistance

## 2023-09-07 NOTE — TELEPHONE ENCOUNTER
Milagros Lanza was referred to Corinne Landsman, CSW by Dr. Sarah Hyman and KIET Nagy. Patient is stated to be concerned about his upcoming treatment and the possibility of losing his insurance. Patient voices hesitation regarding applying for Medicaid. Patient reports he will reapply for his current insurance later in the year. This SW encouraged the patient to go ahead and apply for medicaid to see if he qualifies as he has been recommended to do by other health care professionals. SW also asked permission to connect the patient to [address] [place] [address] to assist with the discussion of the potential payment for the [place]. Patient verbally agreed to have the Financial Navigator contact him.  This SW emailed the patients information to the Financial Navigation team.

## 2023-09-07 NOTE — PROGRESS NOTES
Name: Stu Marrero  MRN: [id number]  Date of Service:  9/7/2023 09/07/23 1300   Restrictions/Precautions   Restrictions/Precautions Fall Risk   Position Activity Restriction   Other position/activity restrictions abdominal staples--gait belt to be worn high, severe scrotal edema with compression garment   General   Chart Reviewed Yes   Patient assessed for rehabilitation services? Yes   Additional Pertinent Hx HYPERTENSION, ACUTE KIDNEY FAILURE, ACUTE EMBOLISM/THROMBOSIS OF LE, LI, OBESITY   Diagnosis 8/22 R RADICAL NEPHRECTOMY AND CAVAL THROMBECTOMY   Follows Commands WFL   General Comment   Comments Pt able to sit on toilet and independently perform personal hygiene (urination). Subjective   Subjective Pt sitting in recliner, agrees to participate in therapy. Subjective   Subjective Verbal: RLE (due to sciatica) 4/10 and discomfort due to swelling in BLE/scrotum   Bed mobility   Bridging Stand by assistance  (Use of B overhead rails as well: very minimally- due to BLE swelling (R worse than L))   Rolling to Left Independent   Rolling to Right Independent   Sit to Supine Modified independent   Scooting Independent   Bed Mobility Comments On L side of bed- use of bedrail   Transfers   Sit to Stand Modified independent; Independent   Stand to Sit Modified independent; Independent   Bed to Chair Stand by assistance   Ambulation   Surface Level tile   Device Rollator   Assistance Supervision   Quality of Gait wide GENEVA- improved greatly vs previous txs, slight anterior lean when moving RW, toe-out gait-improved greatly vs previous txs, reciprocal pattern   Gait Deviations Increased GENEVA; Decreased step height   Distance 50', 75'   Comments Multiple L/R turns   More Ambulation?  Yes   Ambulation 2   Surface - 2 level tile   Device 2 Single point cane  (R)   Assistance 2 Stand by assistance   Quality of Gait 2 same as previous, increased unsteadiness vs amb. w/ an AD   Gait Deviations Slow Cherelle;Decreased step

## 2023-09-07 NOTE — PROGRESS NOTES
Nephrology ([address] [facility]) Progress Note    Patient:  Petra Lacy  YOB: 1970  Date of Service: 9/7/2023  MRN: [id number]   Acct: [de-identified]   Primary Care Physician: Katie Mcclelland MD  Advance Directive: Full Code  Admit Date: 8/31/2023       Hospital Day: 7  Referring Provider: Adan Jacome MD    Patient independently seen and examined, Chart, Consults, Notes, Operative notes, Labs, Cardiology, and Radiology studies reviewed as available. Subjective:  Petra Lacy is a 52 y.o. male for whom we were consulted for evaluation and treatment of increasing edema/anasarca. He was recently hospitalized at [facility] with nausea, vomiting and abdominal pain. 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 Urology has recommended to transfer him to tertiary care center. He was accepted to [facility] where he underwent right radical nephrectomy and renal caval thrombectomy. He tolerated procedure very well. Postprocedure, he was transferred to [facility] for rehab. However, he has noticed increasing edema of lower extremities and scrotal edema. His serum creatinine was 1.2 and nephrology was consulted for the treatment of edema and CKD. Otherwise, he had a history of type 2 diabetes, insulin-dependent, obesity, obstructive sleep apnea, hypertension and history of panic attack. Hospital course remarkable for administration of IV diuretics and he has excellent urine output. Today, no overnight events. Continues with good urine output and improving edema. Denied other complaints upon questioning but still with some weakness. Questions about discharge planning and medication regimen again noted and reviewed with patient. Allergies:  Patient has no known allergies.     Medicines:  Current Facility-Administered Medications   Medication Dose Route Frequency Provider Last Rate Last Admin DVT Study Measurements Left 2D Measurements +------------------------------------+----------+---------------+----------+ ! Location                            ! Visualized! Compressibility! Thrombosis! +------------------------------------+----------+---------------+----------+ ! External Iliac                      ! No        !               !          ! +------------------------------------+----------+---------------+----------+ ! Sapheno Femoral Junction            ! Yes       ! No             !          ! +------------------------------------+----------+---------------+----------+ ! Common Femoral                      !Yes       ! No             !          ! +------------------------------------+----------+---------------+----------+ ! Prox Femoral                        !Yes       ! No             !          ! +------------------------------------+----------+---------------+----------+ ! Mid Femoral                         !Yes       ! No             !          ! +------------------------------------+----------+---------------+----------+ ! Dist Femoral                        !Yes       ! No             !          ! +------------------------------------+----------+---------------+----------+ ! Deep Femoral                        !Yes       ! No             !          ! +------------------------------------+----------+---------------+----------+ ! Popliteal                           !Yes       ! No             !          ! +------------------------------------+----------+---------------+----------+ ! SSV                                 ! Yes       ! Yes            ! None      ! +------------------------------------+----------+---------------+----------+ ! Gastroc                             ! Yes       ! No             !          ! +------------------------------------+----------+---------------+----------+ ! PTV                                 ! Yes       ! No             !          !

## 2023-09-08 VITALS
WEIGHT: 305.9 LBS | DIASTOLIC BLOOD PRESSURE: 76 MMHG | RESPIRATION RATE: 20 BRPM | SYSTOLIC BLOOD PRESSURE: 146 MMHG | OXYGEN SATURATION: 100 % | BODY MASS INDEX: 41.43 KG/M2 | HEART RATE: 83 BPM | TEMPERATURE: 97.7 F | HEIGHT: 72 IN

## 2023-09-08 LAB
ANION GAP SERPL CALCULATED.3IONS-SCNC: 10 MMOL/L (ref 7–19)
BUN SERPL-MCNC: 22 MG/DL (ref 6–20)
CALCIUM SERPL-MCNC: 8.7 MG/DL (ref 8.6–10)
CHLORIDE SERPL-SCNC: 89 MMOL/L (ref 98–111)
CO2 SERPL-SCNC: 34 MMOL/L (ref 22–29)
CREAT SERPL-MCNC: 1.6 MG/DL (ref 0.5–1.2)
GLUCOSE BLD-MCNC: 132 MG/DL (ref 70–99)
GLUCOSE SERPL-MCNC: 134 MG/DL (ref 74–109)
PERFORMED ON: ABNORMAL
POTASSIUM SERPL-SCNC: 3.8 MMOL/L (ref 3.5–5)
SODIUM SERPL-SCNC: 133 MMOL/L (ref 136–145)

## 2023-09-08 PROCEDURE — 6370000000 HC RX 637 (ALT 250 FOR IP): Performed by: PSYCHIATRY & NEUROLOGY

## 2023-09-08 PROCEDURE — 82962 GLUCOSE BLOOD TEST: CPT

## 2023-09-08 PROCEDURE — 80048 BASIC METABOLIC PNL TOTAL CA: CPT

## 2023-09-08 PROCEDURE — 36415 COLL VENOUS BLD VENIPUNCTURE: CPT

## 2023-09-08 PROCEDURE — 99239 HOSP IP/OBS DSCHRG MGMT >30: CPT | Performed by: PSYCHIATRY & NEUROLOGY

## 2023-09-08 RX ORDER — ATORVASTATIN CALCIUM 10 MG/1
10 TABLET, FILM COATED ORAL NIGHTLY
Qty: 30 TABLET | Refills: 3 | Status: SHIPPED | OUTPATIENT
Start: 2023-09-08

## 2023-09-08 RX ORDER — PANTOPRAZOLE SODIUM 40 MG/1
40 TABLET, DELAYED RELEASE ORAL DAILY
Qty: 30 TABLET | Refills: 3 | Status: SHIPPED | OUTPATIENT
Start: 2023-09-08

## 2023-09-08 RX ORDER — BUMETANIDE 1 MG/1
1 TABLET ORAL DAILY
Qty: 30 TABLET | Refills: 3 | Status: SHIPPED | OUTPATIENT
Start: 2023-09-08

## 2023-09-08 RX ORDER — OXYCODONE HYDROCHLORIDE 5 MG/1
5 TABLET ORAL EVERY 4 HOURS PRN
Qty: 30 TABLET | Refills: 0 | Status: SHIPPED | OUTPATIENT
Start: 2023-09-08 | End: 2023-09-22

## 2023-09-08 RX ORDER — PREGABALIN 100 MG/1
100 CAPSULE ORAL 2 TIMES DAILY
Qty: 60 CAPSULE | Refills: 3 | Status: SHIPPED | OUTPATIENT
Start: 2023-09-08 | End: 2024-01-06

## 2023-09-08 RX ADMIN — PREGABALIN 100 MG: 50 CAPSULE ORAL at 09:20

## 2023-09-08 RX ADMIN — LINACLOTIDE 145 MCG: 145 CAPSULE, GELATIN COATED ORAL at 05:21

## 2023-09-08 RX ADMIN — APIXABAN 5 MG: 5 TABLET, FILM COATED ORAL at 09:20

## 2023-09-08 RX ADMIN — SENNOSIDES AND DOCUSATE SODIUM 1 TABLET: 50; 8.6 TABLET ORAL at 09:20

## 2023-09-08 RX ADMIN — MULTIPLE VITAMINS W/ MINERALS TAB 1 TABLET: TAB at 09:20

## 2023-09-08 RX ADMIN — OXYCODONE HYDROCHLORIDE 5 MG: 5 TABLET ORAL at 05:21

## 2023-09-08 RX ADMIN — PANTOPRAZOLE SODIUM 40 MG: 40 TABLET, DELAYED RELEASE ORAL at 09:20

## 2023-09-08 ASSESSMENT — PAIN DESCRIPTION - ORIENTATION: ORIENTATION: ANTERIOR

## 2023-09-08 ASSESSMENT — PAIN SCALES - GENERAL
PAINLEVEL_OUTOF10: 7
PAINLEVEL_OUTOF10: 3

## 2023-09-08 ASSESSMENT — PAIN - FUNCTIONAL ASSESSMENT: PAIN_FUNCTIONAL_ASSESSMENT: ACTIVITIES ARE NOT PREVENTED

## 2023-09-08 ASSESSMENT — PAIN DESCRIPTION - DESCRIPTORS: DESCRIPTORS: ACHING

## 2023-09-08 ASSESSMENT — PAIN DESCRIPTION - LOCATION: LOCATION: ABDOMEN

## 2023-09-08 NOTE — DISCHARGE SUMMARY
Neurology Discharge Summary     Patient Identification:  Stu Marrero is a 52 y.o. male. 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 *      Discharge Medications:    Current Discharge Medication List             Details   oxyCODONE (ROXICODONE) 5 MG immediate release tablet Take 1 tablet by mouth every 4 hours as needed for Pain for up to 14 days. Max Daily Amount: 30 mg  Qty: 30 tablet, Refills: 0    Comments: Reduce doses taken as pain becomes manageable  Associated Diagnoses: Renal cell carcinoma of kidney excluding renal pelvis (HCC)      apixaban (ELIQUIS) 5 MG TABS tablet Take 1 tablet by mouth 2 times daily  Qty: 60 tablet, Refills: 1                Details   pregabalin (LYRICA) 100 MG capsule Take 1 capsule by mouth 2 times daily for 120 days.  Max Daily Amount: 200 mg  Qty: 60 capsule, Refills: 3    Associated Diagnoses: Renal cell carcinoma of kidney excluding renal pelvis (HCC)      atorvastatin (LIPITOR) 10 MG tablet Take 1 tablet by mouth nightly  Qty: 30 tablet, Refills: 3      pantoprazole (PROTONIX) 40 MG tablet Take 1 tablet by mouth daily  Qty: 30 tablet, Refills: 3                Details   insulin glargine (LANTUS) 100 UNIT/ML injection vial Inject 20 Units into the skin nightly      insulin lispro (HUMALOG) 100 UNIT/ML SOLN injection vial Inject 8 Units into the skin 3 times daily (with meals)      Multiple Vitamins-Minerals (THERA-M PO) Take 1 tablet by mouth daily      simethicone (MYLICON) 80 MG chewable tablet Take 1 tablet by mouth 3 times daily as needed for Flatulence      traZODone (DESYREL) 50 MG tablet Take 1

## 2023-09-08 NOTE — PROGRESS NOTES
Sonographer             Caesar Mayer RVT  Procedure Type of Study:   Veins:Lower Extremities DVT Study, VL LOWER EXTREMITY BILATERAL VENOUS  DUPLEX . Indications for Study:Edema, bilateral lower extremity.   - Results were reported to:Dr. Quinteros @10:45am.  Impression   There is evidence of subacute deep vein thrombosis in the bilateral lower  extremity involving the common femoral, profunda femoris, femoral,  popliteal, peroneal, posterior tibial, gastrocnemius, and soleal veins. Superficial thrombophlebitis is seen in the great saphenous vein of the  bilateral lower extremities. Signature   ----------------------------------------------------------------  Electronically signed by Cristi Goode MD(Interpreting  physician) on 08/15/2023 12:40 PM  ----------------------------------------------------------------  Velocities are measured in cm/s ; Diameters are measured in mm Right Lower Extremities DVT Study Measurements Right 2D Measurements +------------------------------------+----------+---------------+----------+ ! Location                            ! Visualized! Compressibility! Thrombosis! +------------------------------------+----------+---------------+----------+ ! External Iliac                      ! No        !               !          ! +------------------------------------+----------+---------------+----------+ ! Sapheno Femoral Junction            ! Yes       ! No             !          ! +------------------------------------+----------+---------------+----------+ ! Common Femoral                      !Yes       ! No             !          ! +------------------------------------+----------+---------------+----------+ ! Prox Femoral                        !Yes       ! No             !          ! +------------------------------------+----------+---------------+----------+ ! Mid Femoral                         !Yes       ! No             !          ! +------------------------------------+----------+---------------+----------+ ! Dist Femoral                        !Yes       ! No             !          ! +------------------------------------+----------+---------------+----------+ ! Deep Femoral                        !Yes       ! No             !          ! +------------------------------------+----------+---------------+----------+ ! Popliteal                           !Yes       ! No             !          ! +------------------------------------+----------+---------------+----------+ ! SSV                                 ! Yes       ! Yes            ! None      ! +------------------------------------+----------+---------------+----------+ ! Gastroc                             ! Yes       ! No             !          ! +------------------------------------+----------+---------------+----------+ ! PTV                                 ! Yes       ! No             !          ! +------------------------------------+----------+---------------+----------+ ! GSV                                 ! Yes       ! No             !          ! +------------------------------------+----------+---------------+----------+ ! ATV                                 ! Yes       ! Yes            ! None      ! +------------------------------------+----------+---------------+----------+ ! Peroneal                            !Yes       ! No             !          ! +------------------------------------+----------+---------------+----------+ ! Soleal                              !Yes       ! No             !          ! +------------------------------------+----------+---------------+----------+ Left Lower Extremities DVT Study Measurements Left 2D Measurements +------------------------------------+----------+---------------+----------+ ! Location                            ! Visualized! Compressibility! Thrombosis! +------------------------------------+----------+---------------+----------+ ! External Iliac

## 2023-09-08 NOTE — CARE COORDINATION
Mr. Beto Mayfield, being discharged home, with significant other, declined need for home health care, has medical follow ups including oncology and nephrology. Application for [employer] parking tag given.

## 2023-09-08 NOTE — DISCHARGE SUMMARY
Occupational Therapy Discharge Summary         Date: 2023  Patient Name: Major Dewey        MRN: [id number]    : 1970  (52 y.o.)  Gender: male      Diagnosis:  R RADICAL NEPHRECTOMY AND CAVAL THROMBECTOMY  Restrictions/Precautions  Restrictions/Precautions: Fall Risk      Discharge Date: 23      UE Functioning:  WFLs    Home Management:  Functional Mobility  Functional - Mobility Device: 4-Wheeled Walker  Activity: Other, Retrieve items, Transport items  Assist Level: Supervision  Functional Mobility Comments: Simple food prep task in kitchen, utilized counters at times for balance. Ambulated in halls, and pt. room. Adaptive Equipment/DME Status:   Arranged Rollator  Home Equipment: Cane      Pain Assessment:          Remaining Problems:  Decreased functional mobility ; Decreased high-level IADLs;Decreased endurance;Decreased strength    STGs:  Short Term Goals  Time Frame for Short Term Goals: 1 week  Short Term Goal 1: complete LB dressing with AE with supervision  Short Term Goal 2: complete 1-2 handed standing level tasks for 3 mins with supervision  Short Term Goal 3: complete overall toileting with supervision  Short Term Goal 4: complete overall bathing with LH sponge with supervision  Short Term Goal 5: complete simple ambulatory home making task with supervision  Met all STGs    LTGs:  Long Term Goals  Time Frame for Long Term Goals : 2 weeks  Long Term Goal 1: complete overall toileting with modified independence  Long Term Goal 2: complete overall bathing with modified independence  Long Term Goal 3: complete simple ambulatory home making task with modified independence  Long Term Goal 4: complete HEP with independence  Long Term Goal 5: complete overall dressing with modified independence  Met LTG 4    Discharge Setting and Recommendations:  Home with significant other to assist and HEP.      Discharge Care Scores    Eating: CARE Score: 6  Oral Hygiene: CARE Score: 6  Toileting:

## 2023-09-08 NOTE — PROGRESS NOTES
Discontinued staples to abdominal area. Patient tolerated procedure well. Applied steri-strips [facility].

## 2023-09-11 ENCOUNTER — TELEPHONE (OUTPATIENT)
Dept: FAMILY MEDICINE CLINIC | Age: 53
End: 2023-09-11

## 2023-09-11 ENCOUNTER — HOSPITAL ENCOUNTER (OUTPATIENT)
Dept: MRI IMAGING | Age: 53
Discharge: HOME | End: 2023-09-11
Payer: COMMERCIAL

## 2023-09-11 DIAGNOSIS — C64.2 RENAL CELL CARCINOMA OF LEFT KIDNEY (HCC): ICD-10-CM

## 2023-09-11 PROCEDURE — 70553 MRI BRAIN STEM W/O & W/DYE: CPT

## 2023-09-11 PROCEDURE — 6360000004 HC RX CONTRAST MEDICATION: Performed by: NURSE PRACTITIONER

## 2023-09-11 PROCEDURE — A9577 INJ MULTIHANCE: HCPCS | Performed by: NURSE PRACTITIONER

## 2023-09-11 RX ADMIN — GADOBENATE DIMEGLUMINE 20 ML: 529 INJECTION, SOLUTION INTRAVENOUS at 10:18

## 2023-09-11 NOTE — TELEPHONE ENCOUNTER
Care Transitions Initial Follow Up Call    Outreach made within 2 business days of discharge: Yes    Patient: Genveieve Hernandez Patient : 1970   MRN: [id number]  Reason for Admission: Renal Carcinoma of Kidney Excluding Renal Pelvis  Discharge Date: 23       Spoke with: Patient     Discharge department/facility: [address] [place] [address] [employer] Patient Contact:  Was patient able to fill all prescriptions: Yes  Was patient instructed to bring all medications to the follow-up visit: Yes  Is patient taking all medications as directed in the discharge summary?  Yes  Does patient understand their discharge instructions: Yes  Does patient have questions or concerns that need addressed prior to 7-14 day follow up office visit: no    Scheduled appointment with PCP within 7-14 days    Follow Up  Future Appointments   Date Time Provider [address] [place] [address]   2023 11:30 AM Gina Mahmood MD [address] [facility]   2023 10:10 AM SCHEDULE, L MED ONC [place] [facility] MED ONC [facility]   2023 10:45 AM Darin Collado MD [address], [place]

## 2023-09-11 NOTE — DISCHARGE SUMMARY
Physical Therapy DISCHARGE Note  DATE:  2023  NAME:  Merna Douglas  :  1970  (52 y. o.,male)  MRN:  [id number]    HEIGHT:  Height: 6' (182.9 cm)  WEIGHT:  Weight - Scale: (!) 305 lb 14.4 oz (138.8 kg)    PATIENT DIAGNOSIS(ES):    Diagnosis:  R RADICAL NEPHRECTOMY AND CAVAL THROMBECTOMY    Additional Pertinent Hx: HYPERTENSION, ACUTE KIDNEY FAILURE, ACUTE EMBOLISM/THROMBOSIS OF LE, LI, OBESITY  RESTRICTIONS/PRECAUTIONS:    Restrictions/Precautions  Restrictions/Precautions: Fall Risk  Position Activity Restriction  Other position/activity restrictions: abdominal staples--gait belt to be worn high, severe scrotal edema with compression garment  OVERALL  ORIENTATION STATUS:  Overall Orientation Status: Within Functional Limits  PAIN:  Pain Level: 3       Pain Location: Abdomen     Pain Orientation: Anterior      GROSS ASSESSMENT        POSTURE/BALANCE          ACTIVITY TOLERANCE  Activity Tolerance  Activity Tolerance: Other (comment), Patient tolerated treatment well, Patient limited by endurance (Increased swelling in BLE/scrotum when out of bed)      BED MOBILITY  Bed mobility  Bridging: Stand by assistance (Use of B overhead rails as well: very minimally- due to BLE swelling (R worse than L))  Rolling to Left: Independent  Rolling to Right: Independent  Supine to Sit: Modified independent  Sit to Supine: Modified independent  Scooting: Independent  Bed Mobility Comments: On L side of bed- use of bedrail        TRANSFERS  Transfers  Sit to Stand: Modified independent, Independent  Stand to Sit: Modified independent, Independent  Bed to Chair: Stand by assistance  Car Transfer: Supervision, Modified independent (Low setting)       AMBULATION 1  Ambulation  Surface: Level tile  Device: Rollator  Other Apparatus: Wheelchair follow  Assistance: Supervision  Quality of Gait: wide GENEVA- improved greatly vs previous txs, slight anterior lean when moving RW, toe-out gait-improved greatly vs previous txs,

## 2023-09-13 ENCOUNTER — CLINICAL DOCUMENTATION (OUTPATIENT)
Facility: HOSPITAL | Age: 53
End: 2023-09-13

## 2023-09-13 NOTE — PROGRESS NOTES
I talked with the patient and informed him that he has met his oop and there wouldn't be an expense to him. I informed Davonte Salazar in the back that it is okay to start him on his [place] treatment since he is okay with his insurance. Patient is worried about him dropping in his income level for next year and that he will be on medicaid so I am referring him to Dru Fus to talk or call him to talk more in detail. I am mailing the patient his benefits and my contact information for any financial or billing concerns.

## 2023-09-14 ENCOUNTER — TELEPHONE (OUTPATIENT)
Dept: INFUSION THERAPY | Age: 53
End: 2023-09-14

## 2023-09-14 ENCOUNTER — OFFICE VISIT (OUTPATIENT)
Dept: FAMILY MEDICINE CLINIC | Age: 53
End: 2023-09-14

## 2023-09-14 VITALS
HEIGHT: 72 IN | BODY MASS INDEX: 36.44 KG/M2 | HEART RATE: 89 BPM | WEIGHT: 269 LBS | DIASTOLIC BLOOD PRESSURE: 68 MMHG | OXYGEN SATURATION: 98 % | RESPIRATION RATE: 18 BRPM | TEMPERATURE: 96.9 F | SYSTOLIC BLOOD PRESSURE: 112 MMHG

## 2023-09-14 DIAGNOSIS — Z09 HOSPITAL DISCHARGE FOLLOW-UP: ICD-10-CM

## 2023-09-14 DIAGNOSIS — E11.9 TYPE 2 DIABETES MELLITUS WITHOUT COMPLICATION, WITH LONG-TERM CURRENT USE OF INSULIN (HCC): ICD-10-CM

## 2023-09-14 DIAGNOSIS — Z79.4 TYPE 2 DIABETES MELLITUS WITHOUT COMPLICATION, WITH LONG-TERM CURRENT USE OF INSULIN (HCC): ICD-10-CM

## 2023-09-14 DIAGNOSIS — C64.9 RENAL CELL CARCINOMA OF KIDNEY EXCLUDING RENAL PELVIS (HCC): Primary | ICD-10-CM

## 2023-09-14 DIAGNOSIS — E78.5 HYPERLIPIDEMIA, UNSPECIFIED HYPERLIPIDEMIA TYPE: ICD-10-CM

## 2023-09-14 RX ORDER — PROCHLORPERAZINE 25 MG/1
SUPPOSITORY RECTAL
Qty: 3 EACH | Refills: 3 | Status: SHIPPED | OUTPATIENT
Start: 2023-09-14

## 2023-09-14 RX ORDER — PROCHLORPERAZINE 25 MG/1
1 SUPPOSITORY RECTAL CONTINUOUS
Qty: 3 EACH | Refills: 3 | Status: SHIPPED | OUTPATIENT
Start: 2023-09-14

## 2023-09-14 NOTE — PROGRESS NOTES
Post-Discharge Transitional Care Follow Up      Celena Krabbe   YOB: 1970    Date of Office Visit:  9/14/2023  Date of Hospital Admission: 8/31/23  Date of Hospital Discharge: 9/8/23  Readmission Risk Score (high >=14%. Medium >=10%):Readmission Risk Score: 16.1      Care management risk score Rising risk (score 2-5) and Complex Care (Scores >=6): No Risk Score On File     Non face to face  following discharge, date last encounter closed (first attempt may have been earlier): 09/11/2023     Call initiated 2 business days of discharge: Yes     Renal cell carcinoma of kidney excluding renal pelvis ([address])  -     External Referral To Hematology Oncology  Type 2 diabetes mellitus without complication, with long-term current use of insulin (HCC)  -     Continuous Blood Gluc Sensor (DEXCOM G6 SENSOR) MISC; Change sensor every 10 days, Disp-3 each, R-3Normal  -     Continuous Blood Gluc Transmit (DEXCOM G6 TRANSMITTER) MISC; 1 each by Does not apply route continuous, Disp-3 each, R-3Normal  Hyperlipidemia, unspecified hyperlipidemia type  Hospital discharge follow-up  -     [facility] DISCHARGE MEDS RECONCILED W/ CURRENT OUTPATIENT MED LIST      He is simply wanting a second opionion on Keytruda. Discussed my confidence with the oncologist that he saw but with this not being the area of expertise and desires to pursue a second opinion discussed we get him set up to discuss with another oncologist for further recommendations but stressed importance maintain follow-up with his oncologist for further discussion and determination on management moving forward. Stressed importance being diligent with his diabetic diet and medications. Stressed importance of diligent with his Lipitor and benefits of diet and exercise. Discussed signs and symptoms primary continue. Questions were answered and patient voiced understanding agreement plan discussed.     Medical Decision Making: high complexity  Return if symptoms worsen or

## 2023-09-14 NOTE — TELEPHONE ENCOUNTER
PT's S/O called with PT on the phone as well. PT requests to change appt to later date d/t wanting second opinion on [facility] options. Appt. Changed to October 2nd at 1200 to see Dr. Murphy Juan in [facility] room and possible Keytruda. This RN explained to PT that if anything changes to contact the office to cancel or change appt. PT verbalizes understanding with no further questions at this time.

## 2023-09-21 ASSESSMENT — ENCOUNTER SYMPTOMS
DIARRHEA: 0
SHORTNESS OF BREATH: 0
VOMITING: 0
BACK PAIN: 0
NAUSEA: 0
COUGH: 0
RHINORRHEA: 0
ABDOMINAL PAIN: 0
CONSTIPATION: 0

## 2023-09-25 DIAGNOSIS — E11.9 TYPE 2 DIABETES MELLITUS WITHOUT COMPLICATION, WITH LONG-TERM CURRENT USE OF INSULIN (HCC): ICD-10-CM

## 2023-09-25 DIAGNOSIS — Z79.4 TYPE 2 DIABETES MELLITUS WITHOUT COMPLICATION, WITH LONG-TERM CURRENT USE OF INSULIN (HCC): ICD-10-CM

## 2023-09-29 NOTE — PROGRESS NOTES
TRANSMITTER) MISC 1 each by Does not apply route continuous 3 each 3    apixaban (ELIQUIS) 5 MG TABS tablet Take 1 tablet by mouth 2 times daily 60 tablet 1    pregabalin (LYRICA) 100 MG capsule Take 1 capsule by mouth 2 times daily for 120 days. Max Daily Amount: 200 mg 60 capsule 3    atorvastatin (LIPITOR) 10 MG tablet Take 1 tablet by mouth nightly 30 tablet 3    pantoprazole (PROTONIX) 40 MG tablet Take 1 tablet by mouth daily 30 tablet 3    bumetanide (BUMEX) 1 MG tablet Take 1 tablet by mouth daily 30 tablet 3    insulin glargine (LANTUS) 100 UNIT/ML injection vial Inject 20 Units into the skin nightly      insulin lispro (HUMALOG) 100 UNIT/ML SOLN injection vial Inject 8 Units into the skin 3 times daily (with meals)      Multiple Vitamins-Minerals (THERA-M PO) Take 1 tablet by mouth daily      simethicone (MYLICON) 80 MG chewable tablet Take 1 tablet by mouth 3 times daily as needed for Flatulence      traZODone (DESYREL) 50 MG tablet Take 1 tablet by mouth nightly (Patient not taking: Reported on 9/14/2023)      Misc. Devices MISC 1 each by Does not apply route once for 1 dose Cpap mask and supplies 1 each 5    blood glucose monitor strips Test 3 times a day & as needed for symptoms of irregular blood glucose. Dispense sufficient amount for indicated testing frequency plus additional to accommodate PRN testing needs. 100 strip 2    Continuous Blood Gluc  (DEXCOM G6 ) YUNG 1 device continuous route does not apply. 1 each 0     No current facility-administered medications for this visit.      Facility-Administered Medications Ordered in Other Visits   Medication Dose Route Frequency Provider Last Rate Last Admin    0.9 % sodium chloride infusion  5-250 mL/hr IntraVENous PRN Azell Cockayne, MD   Stopped at 10/02/23 [id number]        REVIEW OF SYSTEMS:    Constitutional: no fever, no night sweats,  fatigue;   HEENT: no blurring of vision, no double vision, no hearing difficulty, no tinnitus,no

## 2023-10-02 ENCOUNTER — HOSPITAL ENCOUNTER (OUTPATIENT)
Dept: INFUSION THERAPY | Age: 53
Discharge: HOME | End: 2023-10-02
Payer: COMMERCIAL

## 2023-10-02 ENCOUNTER — OFFICE VISIT (OUTPATIENT)
Dept: HEMATOLOGY | Age: 53
End: 2023-10-02
Payer: COMMERCIAL

## 2023-10-02 VITALS
DIASTOLIC BLOOD PRESSURE: 84 MMHG | RESPIRATION RATE: 18 BRPM | HEART RATE: 82 BPM | HEIGHT: 72 IN | OXYGEN SATURATION: 97 % | SYSTOLIC BLOOD PRESSURE: 118 MMHG | WEIGHT: 270.9 LBS | BODY MASS INDEX: 36.69 KG/M2 | TEMPERATURE: 97.1 F

## 2023-10-02 DIAGNOSIS — R53.83 FATIGUE DUE TO TREATMENT: ICD-10-CM

## 2023-10-02 DIAGNOSIS — D64.9 NORMOCYTIC ANEMIA: ICD-10-CM

## 2023-10-02 DIAGNOSIS — I82.4Z3 DVT, LOWER EXTREMITY, DISTAL, ACUTE, BILATERAL (HCC): ICD-10-CM

## 2023-10-02 DIAGNOSIS — Z51.12 ENCOUNTER FOR ANTINEOPLASTIC IMMUNOTHERAPY: ICD-10-CM

## 2023-10-02 DIAGNOSIS — Z79.01 CHRONIC ANTICOAGULATION: ICD-10-CM

## 2023-10-02 DIAGNOSIS — C64.9 RENAL CELL CARCINOMA OF KIDNEY EXCLUDING RENAL PELVIS (HCC): ICD-10-CM

## 2023-10-02 DIAGNOSIS — Z78.9 POOR INTRAVENOUS ACCESS: ICD-10-CM

## 2023-10-02 DIAGNOSIS — C64.9 RENAL CELL CARCINOMA OF KIDNEY EXCLUDING RENAL PELVIS (HCC): Primary | ICD-10-CM

## 2023-10-02 DIAGNOSIS — N28.9 RENAL IMPAIRMENT: ICD-10-CM

## 2023-10-02 LAB
ALBUMIN SERPL-MCNC: 3.9 G/DL (ref 3.5–5.2)
ALP SERPL-CCNC: 83 U/L (ref 40–130)
ALT SERPL-CCNC: 8 U/L (ref 5–41)
ANION GAP SERPL CALCULATED.3IONS-SCNC: 11 MMOL/L (ref 7–19)
AST SERPL-CCNC: 16 U/L (ref 5–40)
BASOPHILS # BLD: 0.07 K/UL (ref 0.01–0.08)
BASOPHILS NFR BLD: 1 % (ref 0.1–1.2)
BILIRUB SERPL-MCNC: 0.9 MG/DL (ref 0.2–1.2)
BUN SERPL-MCNC: 16 MG/DL (ref 6–20)
CALCIUM SERPL-MCNC: 9.1 MG/DL (ref 8.6–10)
CHLORIDE SERPL-SCNC: 105 MMOL/L (ref 98–111)
CO2 SERPL-SCNC: 25 MMOL/L (ref 22–29)
CORTIS AM PEAK SERPL-MCNC: 11.1 UG/DL (ref 6.2–19.4)
CREAT SERPL-MCNC: 1.3 MG/DL (ref 0.5–1.2)
EOSINOPHIL # BLD: 0.32 K/UL (ref 0.04–0.54)
EOSINOPHIL NFR BLD: 4.7 % (ref 0.7–7)
ERYTHROCYTE [DISTWIDTH] IN BLOOD BY AUTOMATED COUNT: 19.1 % (ref 11.6–14.4)
GLUCOSE SERPL-MCNC: 127 MG/DL (ref 74–109)
HCT VFR BLD AUTO: 40 % (ref 40.1–51)
HGB BLD-MCNC: 12.8 G/DL (ref 13.7–17.5)
LYMPHOCYTES # BLD: 1.12 K/UL (ref 1.18–3.74)
LYMPHOCYTES NFR BLD: 16.5 % (ref 19.3–53.1)
MCH RBC QN AUTO: 26.4 PG (ref 25.7–32.2)
MCHC RBC AUTO-ENTMCNC: 32 G/DL (ref 32.3–36.5)
MCV RBC AUTO: 82.5 FL (ref 79–92.2)
MONOCYTES # BLD: 0.44 K/UL (ref 0.24–0.82)
MONOCYTES NFR BLD: 6.5 % (ref 4.7–12.5)
NEUTROPHILS # BLD: 4.82 K/UL (ref 1.56–6.13)
NEUTS SEG NFR BLD: 71 % (ref 34–71.1)
PLATELET # BLD AUTO: 222 K/UL (ref 163–337)
PMV BLD AUTO: 12.3 FL (ref 7.4–10.4)
POTASSIUM SERPL-SCNC: 4.3 MMOL/L (ref 3.5–5)
PROT SERPL-MCNC: 7.2 G/DL (ref 6.6–8.7)
RBC # BLD AUTO: 4.85 M/UL (ref 4.63–6.08)
SODIUM SERPL-SCNC: 141 MMOL/L (ref 136–145)
T4 FREE SERPL-MCNC: 0.99 NG/DL (ref 0.93–1.7)
TSH SERPL DL<=0.005 MIU/L-ACNC: 4.51 UIU/ML (ref 0.27–4.2)
WBC # BLD AUTO: 6.79 K/UL (ref 4.23–9.07)

## 2023-10-02 PROCEDURE — 3074F SYST BP LT 130 MM HG: CPT | Performed by: INTERNAL MEDICINE

## 2023-10-02 PROCEDURE — 85025 COMPLETE CBC W/AUTO DIFF WBC: CPT

## 2023-10-02 PROCEDURE — 99214 OFFICE O/P EST MOD 30 MIN: CPT | Performed by: INTERNAL MEDICINE

## 2023-10-02 PROCEDURE — 3079F DIAST BP 80-89 MM HG: CPT | Performed by: INTERNAL MEDICINE

## 2023-10-02 PROCEDURE — 96413 CHEMO IV INFUSION 1 HR: CPT

## 2023-10-02 PROCEDURE — 36415 COLL VENOUS BLD VENIPUNCTURE: CPT

## 2023-10-02 PROCEDURE — 2580000003 HC RX 258: Performed by: INTERNAL MEDICINE

## 2023-10-02 PROCEDURE — 6360000002 HC RX W HCPCS: Performed by: INTERNAL MEDICINE

## 2023-10-02 RX ORDER — SODIUM CHLORIDE 9 MG/ML
5-250 INJECTION, SOLUTION INTRAVENOUS PRN
Status: DISCONTINUED | OUTPATIENT
Start: 2023-10-02 | End: 2023-10-03 | Stop reason: HOSPADM

## 2023-10-02 RX ORDER — FAMOTIDINE 10 MG/ML
20 INJECTION, SOLUTION INTRAVENOUS
Status: CANCELLED | OUTPATIENT
Start: 2023-10-02

## 2023-10-02 RX ORDER — SODIUM CHLORIDE 0.9 % (FLUSH) 0.9 %
5-40 SYRINGE (ML) INJECTION PRN
Status: CANCELLED | OUTPATIENT
Start: 2023-10-02

## 2023-10-02 RX ORDER — SODIUM CHLORIDE 9 MG/ML
INJECTION, SOLUTION INTRAVENOUS CONTINUOUS
Status: CANCELLED | OUTPATIENT
Start: 2023-10-02

## 2023-10-02 RX ORDER — DIPHENHYDRAMINE HYDROCHLORIDE 50 MG/ML
50 INJECTION INTRAMUSCULAR; INTRAVENOUS
Status: CANCELLED | OUTPATIENT
Start: 2023-10-02

## 2023-10-02 RX ORDER — ONDANSETRON 2 MG/ML
8 INJECTION INTRAMUSCULAR; INTRAVENOUS
Status: CANCELLED | OUTPATIENT
Start: 2023-10-02

## 2023-10-02 RX ORDER — MEPERIDINE HYDROCHLORIDE 50 MG/ML
12.5 INJECTION INTRAMUSCULAR; INTRAVENOUS; SUBCUTANEOUS PRN
Status: CANCELLED | OUTPATIENT
Start: 2023-10-02

## 2023-10-02 RX ORDER — HEPARIN SODIUM (PORCINE) LOCK FLUSH IV SOLN 100 UNIT/ML 100 UNIT/ML
500 SOLUTION INTRAVENOUS PRN
Status: CANCELLED | OUTPATIENT
Start: 2023-10-02

## 2023-10-02 RX ORDER — SODIUM CHLORIDE 9 MG/ML
5-250 INJECTION, SOLUTION INTRAVENOUS PRN
Status: CANCELLED | OUTPATIENT
Start: 2023-10-02

## 2023-10-02 RX ORDER — EPINEPHRINE 1 MG/ML
0.3 INJECTION, SOLUTION, CONCENTRATE INTRAVENOUS PRN
Status: CANCELLED | OUTPATIENT
Start: 2023-10-02

## 2023-10-02 RX ORDER — ACETAMINOPHEN 325 MG/1
650 TABLET ORAL
Status: CANCELLED | OUTPATIENT
Start: 2023-10-02

## 2023-10-02 RX ORDER — ALBUTEROL SULFATE 90 UG/1
4 AEROSOL, METERED RESPIRATORY (INHALATION) PRN
Status: CANCELLED | OUTPATIENT
Start: 2023-10-02

## 2023-10-02 RX ADMIN — SODIUM CHLORIDE 100 ML/HR: 9 INJECTION, SOLUTION INTRAVENOUS at 13:46

## 2023-10-02 RX ADMIN — SODIUM CHLORIDE 200 MG: 9 INJECTION, SOLUTION INTRAVENOUS at 13:47

## 2023-10-25 ENCOUNTER — HOSPITAL ENCOUNTER (OUTPATIENT)
Dept: INFUSION THERAPY | Age: 53
Setting detail: INFUSION SERIES
Discharge: HOME | End: 2023-10-25
Payer: COMMERCIAL

## 2023-10-25 VITALS
SYSTOLIC BLOOD PRESSURE: 136 MMHG | DIASTOLIC BLOOD PRESSURE: 87 MMHG | HEART RATE: 69 BPM | RESPIRATION RATE: 18 BRPM | TEMPERATURE: 97.2 F | OXYGEN SATURATION: 100 %

## 2023-10-25 DIAGNOSIS — C64.9 RENAL CELL CARCINOMA OF KIDNEY EXCLUDING RENAL PELVIS (HCC): Primary | ICD-10-CM

## 2023-10-25 DIAGNOSIS — R79.89 ELEVATED SERUM CREATININE: ICD-10-CM

## 2023-10-25 DIAGNOSIS — E86.0 DEHYDRATION: ICD-10-CM

## 2023-10-25 LAB
ALBUMIN SERPL-MCNC: 4.3 G/DL (ref 3.5–5.2)
ALP SERPL-CCNC: 90 U/L (ref 40–130)
ALT SERPL-CCNC: 10 U/L (ref 5–41)
ANION GAP SERPL CALCULATED.3IONS-SCNC: 11 MMOL/L (ref 7–19)
AST SERPL-CCNC: 21 U/L (ref 5–40)
BASOPHILS # BLD: 0.1 K/UL (ref 0–0.2)
BASOPHILS NFR BLD: 0.8 % (ref 0–1)
BILIRUB SERPL-MCNC: 0.8 MG/DL (ref 0.2–1.2)
BUN SERPL-MCNC: 13 MG/DL (ref 6–20)
CALCIUM SERPL-MCNC: 9.1 MG/DL (ref 8.6–10)
CHLORIDE SERPL-SCNC: 101 MMOL/L (ref 98–111)
CO2 SERPL-SCNC: 27 MMOL/L (ref 22–29)
CREAT SERPL-MCNC: 1.4 MG/DL (ref 0.5–1.2)
EOSINOPHIL # BLD: 0.3 K/UL (ref 0–0.6)
EOSINOPHIL NFR BLD: 4.6 % (ref 0–5)
ERYTHROCYTE [DISTWIDTH] IN BLOOD BY AUTOMATED COUNT: 17.3 % (ref 11.5–14.5)
GLUCOSE SERPL-MCNC: 160 MG/DL (ref 74–109)
HCT VFR BLD AUTO: 40 % (ref 42–52)
HGB BLD-MCNC: 12.5 G/DL (ref 14–18)
IMM GRANULOCYTES # BLD: 0 K/UL
LYMPHOCYTES # BLD: 1.5 K/UL (ref 1.1–4.5)
LYMPHOCYTES NFR BLD: 23.5 % (ref 20–40)
MCH RBC QN AUTO: 27.7 PG (ref 27–31)
MCHC RBC AUTO-ENTMCNC: 31.3 G/DL (ref 33–37)
MCV RBC AUTO: 88.7 FL (ref 80–94)
MONOCYTES # BLD: 0.5 K/UL (ref 0–0.9)
MONOCYTES NFR BLD: 8 % (ref 0–10)
NEUTROPHILS # BLD: 4 K/UL (ref 1.5–7.5)
NEUTS SEG NFR BLD: 62.8 % (ref 50–65)
PLATELET # BLD AUTO: 197 K/UL (ref 130–400)
PMV BLD AUTO: 11.5 FL (ref 9.4–12.4)
POTASSIUM SERPL-SCNC: 4.3 MMOL/L (ref 3.5–5)
PROT SERPL-MCNC: 7.5 G/DL (ref 6.6–8.7)
RBC # BLD AUTO: 4.51 M/UL (ref 4.7–6.1)
REASON FOR REJECTION: NORMAL
REJECTED TEST: NORMAL
SODIUM SERPL-SCNC: 139 MMOL/L (ref 136–145)
WBC # BLD AUTO: 6.3 K/UL (ref 4.8–10.8)

## 2023-10-25 PROCEDURE — 80053 COMPREHEN METABOLIC PANEL: CPT

## 2023-10-25 PROCEDURE — 36415 COLL VENOUS BLD VENIPUNCTURE: CPT

## 2023-10-25 PROCEDURE — 85025 COMPLETE CBC W/AUTO DIFF WBC: CPT

## 2023-10-25 PROCEDURE — 99211 OFF/OP EST MAY X REQ PHY/QHP: CPT

## 2023-10-25 RX ORDER — ONDANSETRON 2 MG/ML
8 INJECTION INTRAMUSCULAR; INTRAVENOUS
OUTPATIENT
Start: 2023-10-26

## 2023-10-25 RX ORDER — ACETAMINOPHEN 325 MG/1
650 TABLET ORAL
OUTPATIENT
Start: 2023-10-26

## 2023-10-25 RX ORDER — ALBUTEROL SULFATE 90 UG/1
4 AEROSOL, METERED RESPIRATORY (INHALATION) PRN
OUTPATIENT
Start: 2023-10-26

## 2023-10-25 RX ORDER — SODIUM CHLORIDE 9 MG/ML
INJECTION, SOLUTION INTRAVENOUS CONTINUOUS
OUTPATIENT
Start: 2023-10-26

## 2023-10-25 RX ORDER — 0.9 % SODIUM CHLORIDE 0.9 %
1000 INTRAVENOUS SOLUTION INTRAVENOUS ONCE
Status: CANCELLED | OUTPATIENT
Start: 2023-10-26 | End: 2023-10-26

## 2023-10-25 RX ORDER — FAMOTIDINE 10 MG/ML
20 INJECTION, SOLUTION INTRAVENOUS
OUTPATIENT
Start: 2023-10-26

## 2023-10-25 RX ORDER — SODIUM CHLORIDE 0.9 % (FLUSH) 0.9 %
5-40 SYRINGE (ML) INJECTION PRN
OUTPATIENT
Start: 2023-10-26

## 2023-10-25 RX ORDER — SODIUM CHLORIDE 9 MG/ML
5-250 INJECTION, SOLUTION INTRAVENOUS PRN
Status: CANCELLED | OUTPATIENT
Start: 2023-10-26

## 2023-10-25 RX ORDER — DIPHENHYDRAMINE HYDROCHLORIDE 50 MG/ML
50 INJECTION INTRAMUSCULAR; INTRAVENOUS
OUTPATIENT
Start: 2023-10-26

## 2023-10-25 RX ORDER — EPINEPHRINE 1 MG/ML
0.3 INJECTION, SOLUTION, CONCENTRATE INTRAVENOUS PRN
OUTPATIENT
Start: 2023-10-26

## 2023-10-25 RX ORDER — HEPARIN SODIUM (PORCINE) LOCK FLUSH IV SOLN 100 UNIT/ML 100 UNIT/ML
500 SOLUTION INTRAVENOUS PRN
OUTPATIENT
Start: 2023-10-26

## 2023-10-25 RX ORDER — ONDANSETRON 2 MG/ML
8 INJECTION INTRAMUSCULAR; INTRAVENOUS
Status: CANCELLED | OUTPATIENT
Start: 2023-10-26

## 2023-10-25 RX ORDER — HEPARIN 100 UNIT/ML
500 SYRINGE INTRAVENOUS PRN
OUTPATIENT
Start: 2023-10-26

## 2023-10-25 RX ORDER — MEPERIDINE HYDROCHLORIDE 50 MG/ML
12.5 INJECTION INTRAMUSCULAR; INTRAVENOUS; SUBCUTANEOUS PRN
OUTPATIENT
Start: 2023-10-26

## 2023-10-25 RX ORDER — SODIUM CHLORIDE 9 MG/ML
5-250 INJECTION, SOLUTION INTRAVENOUS PRN
OUTPATIENT
Start: 2023-10-26

## 2023-10-25 NOTE — PROGRESS NOTES
Lab Results   Component Value Date    WBC 6.3 10/25/2023    HGB 12.5 (L) 10/25/2023    HCT 40.0 (L) 10/25/2023    MCV 88.7 10/25/2023     10/25/2023     Lab Results   Component Value Date    NEUTROABS 4.0 10/25/2023     Lab Results   Component Value Date     10/25/2023    K 4.3 10/25/2023     10/25/2023    CO2 27 10/25/2023    BUN 13 10/25/2023    CREATININE 1.4 (H) 10/25/2023    GLUCOSE 160 (H) 10/25/2023    CALCIUM 9.1 10/25/2023    PROT 7.5 10/25/2023    LABALBU 4.3 10/25/2023    BILITOT 0.8 10/25/2023    ALKPHOS 90 10/25/2023    AST 21 10/25/2023    ALT 10 10/25/2023    LABGLOM 59 (A) 10/25/2023

## 2023-10-25 NOTE — PROGRESS NOTES
Baseline labs drawn on arrival to [address]. CMP  hemolyzed, and redrawn. Pt stated he had to leave and wished to reschedule for tomorrow. Hien Kurtz, PICC RN notified of difficult IV insertion, and stated he will be available tomorrow to insert IV using US. Renae Bowers, RN notified of events.

## 2023-10-26 ENCOUNTER — HOSPITAL ENCOUNTER (OUTPATIENT)
Dept: INFUSION THERAPY | Age: 53
Setting detail: INFUSION SERIES
Discharge: HOME | End: 2023-10-26
Payer: COMMERCIAL

## 2023-10-26 VITALS
HEART RATE: 75 BPM | SYSTOLIC BLOOD PRESSURE: 179 MMHG | BODY MASS INDEX: 36.72 KG/M2 | TEMPERATURE: 96.1 F | OXYGEN SATURATION: 97 % | WEIGHT: 270.73 LBS | RESPIRATION RATE: 18 BRPM | DIASTOLIC BLOOD PRESSURE: 94 MMHG

## 2023-10-26 DIAGNOSIS — R79.89 ELEVATED SERUM CREATININE: Primary | ICD-10-CM

## 2023-10-26 DIAGNOSIS — C64.9 RENAL CELL CARCINOMA OF KIDNEY EXCLUDING RENAL PELVIS (HCC): ICD-10-CM

## 2023-10-26 DIAGNOSIS — E86.0 DEHYDRATION: ICD-10-CM

## 2023-10-26 PROCEDURE — 96417 CHEMO IV INFUS EACH ADDL SEQ: CPT

## 2023-10-26 PROCEDURE — 96413 CHEMO IV INFUSION 1 HR: CPT

## 2023-10-26 PROCEDURE — 2580000003 HC RX 258: Performed by: INTERNAL MEDICINE

## 2023-10-26 PROCEDURE — 6360000002 HC RX W HCPCS: Performed by: INTERNAL MEDICINE

## 2023-10-26 PROCEDURE — 96361 HYDRATE IV INFUSION ADD-ON: CPT

## 2023-10-26 RX ORDER — ONDANSETRON 2 MG/ML
8 INJECTION INTRAMUSCULAR; INTRAVENOUS
Status: DISCONTINUED | OUTPATIENT
Start: 2023-10-26 | End: 2023-10-27 | Stop reason: HOSPADM

## 2023-10-26 RX ORDER — 0.9 % SODIUM CHLORIDE 0.9 %
1000 INTRAVENOUS SOLUTION INTRAVENOUS ONCE
Status: COMPLETED | OUTPATIENT
Start: 2023-10-26 | End: 2023-10-26

## 2023-10-26 RX ORDER — APIXABAN 5 MG/1
5 TABLET, FILM COATED ORAL 2 TIMES DAILY
Qty: 60 TABLET | Refills: 0 | OUTPATIENT
Start: 2023-10-26

## 2023-10-26 RX ORDER — 0.9 % SODIUM CHLORIDE 0.9 %
1000 INTRAVENOUS SOLUTION INTRAVENOUS ONCE
OUTPATIENT
Start: 2023-10-26 | End: 2023-10-26

## 2023-10-26 RX ORDER — SODIUM CHLORIDE 9 MG/ML
5-250 INJECTION, SOLUTION INTRAVENOUS PRN
Status: DISCONTINUED | OUTPATIENT
Start: 2023-10-26 | End: 2023-10-27 | Stop reason: HOSPADM

## 2023-10-26 RX ORDER — HEPARIN 100 UNIT/ML
500 SYRINGE INTRAVENOUS PRN
OUTPATIENT
Start: 2023-10-26

## 2023-10-26 RX ORDER — SODIUM CHLORIDE 0.9 % (FLUSH) 0.9 %
5-40 SYRINGE (ML) INJECTION PRN
OUTPATIENT
Start: 2023-10-26

## 2023-10-26 RX ADMIN — SODIUM CHLORIDE 1000 ML: 9 INJECTION, SOLUTION INTRAVENOUS at 16:03

## 2023-10-26 RX ADMIN — SODIUM CHLORIDE 200 MG: 9 INJECTION, SOLUTION INTRAVENOUS at 17:07

## 2023-10-26 RX ADMIN — SODIUM CHLORIDE 20 ML/HR: 9 INJECTION, SOLUTION INTRAVENOUS at 17:06

## 2023-10-26 NOTE — TELEPHONE ENCOUNTER
Requested Prescriptions     Pending Prescriptions Disp Refills    ELIQUIS 5 MG TABS tablet [Pharmacy Med Name: Eliquis 5 MG Oral Tablet] 60 tablet 0     Sig: Take 1 tablet by mouth twice daily       Last Office Visit: Visit date not found  Next Office Visit: Visit date not found  Last Medication Refill: 9/8/2023 with [id number] [facility] patient for Dr. Kaia Alcala

## 2023-10-26 NOTE — PROGRESS NOTES
1L NS bolus given. Keytruda 200mg administered. Pt tolerated well.      Electronically signed by Tay Sutherland RN on 10/26/2023 at 5:36 PM

## 2023-10-26 NOTE — DISCHARGE INSTRUCTIONS
pembrolizumab  Pronunciation:  PEM brogurmeet BANUELOS ue mab  Brand:  Reba Metcalf  What is the most important information I should know about pembrolizumab? Pembrolizumab can cause serious or life-threatening side effects. Some side effects may need to be treated with other medicine, and your cancer treatments may be delayed. You will need frequent medical tests to help your doctor determine if it is safe for you to keep receiving pembrolizumab. Call your doctor at once if you have: skin problems, vision problems, fever, swollen glands, neck stiffness, chest pain, cough, shortness of breath, muscle or joint pain, pale skin, weakness, diarrhea, severe stomach pain, blood in your stools, bruising or bleeding, dark urine, yellowing of the skin or eyes, a hormonal disorder (frequent headaches, feeling light-headed, rapid heartbeats, a deeper voice, increased thirst or urination, feeling cold, weight gain or loss), or a change in the amount or color of your urine. What is pembrolizumab? Pembrolizumab is a cancer medicine that is used alone or in combination with other medicines to treat certain types of cancer such as:  skin cancer (melanoma, Merkel cell carcinoma, squamous cell carcinoma);  lung cancer;  head and neck cancer;  classical Hodgkin lymphoma;  primary mediastinal large B-cell lymphoma;  cancer of the kidney, bladder, and urinary tract;  colorectal cancer;  liver cancer;  triple-negative breast cancer;  cancer of the cervix or uterus;  advanced stomach or esophageal cancer; or  a type of cancer that laboratory testing proves to have certain specific DNA mutations. Pembrolizumab is often given when the cancer has spread to other parts of the body or cannot be treated with surgery or radiation, or when other cancer treatments did not work or have stopped working.   For some types of cancer, pembrolizumab is given only if your tumor tests positive for \"PD-L1\", or if the tumor has been tested for a specific genetic this service as a supplement to, and not a substitute for, the expertise, skill, knowledge and judgment of healthcare practitioners. The absence of a warning for a given drug or drug combination in no way should be construed to indicate that the drug or drug combination is safe, effective or appropriate for any given patient. [facility] does not assume any responsibility for any aspect of healthcare administered with the aid of information [facility] provides. The information contained herein is not intended to cover all possible uses, directions, precautions, warnings, drug interactions, allergic reactions, or adverse effects. If you have questions about the drugs you are taking, check with your doctor, nurse or pharmacist.  Copyright 8683-5675 [address]: 14.01. Revision date: 3/29/2021. Care instructions adapted under license by Francisco Marroquin. If you have questions about a medical condition or this instruction, always ask your healthcare professional. [address] any warranty or liability for your use of this information.

## 2023-10-27 ENCOUNTER — TELEPHONE (OUTPATIENT)
Dept: INFUSION THERAPY | Age: 53
End: 2023-10-27

## 2023-10-27 NOTE — TELEPHONE ENCOUNTER
Patient contacted clinic and left voicemail reporting increased swelling in bilateral lower legs since receiving IVF and Keytruda yesterday. Returned phone call to patient. He states that he always has swelling in his legs, however it does seem slightly increased. He denies having any redness or pain to bilateral legs. He tells me he is prescribed bumex by nephrology. Provided their phone number to patient. He will call them to report increased swelling for recommendations as well as to get follow up appointment with their office. Patient has pcp appointment scheduled for 11/1.

## 2023-10-31 DIAGNOSIS — E78.5 HYPERLIPIDEMIA, UNSPECIFIED HYPERLIPIDEMIA TYPE: ICD-10-CM

## 2023-10-31 DIAGNOSIS — E11.9 TYPE 2 DIABETES MELLITUS WITHOUT COMPLICATION, WITH LONG-TERM CURRENT USE OF INSULIN (HCC): ICD-10-CM

## 2023-10-31 DIAGNOSIS — Z79.4 TYPE 2 DIABETES MELLITUS WITHOUT COMPLICATION, WITH LONG-TERM CURRENT USE OF INSULIN (HCC): ICD-10-CM

## 2023-10-31 RX ORDER — APIXABAN 5 MG/1
5 TABLET, FILM COATED ORAL 2 TIMES DAILY
Qty: 60 TABLET | Refills: 0 | OUTPATIENT
Start: 2023-10-31

## 2023-10-31 RX ORDER — ATORVASTATIN CALCIUM 80 MG/1
TABLET, FILM COATED ORAL
Qty: 30 TABLET | Refills: 0 | Status: SHIPPED | OUTPATIENT
Start: 2023-10-31

## 2023-11-06 ENCOUNTER — PATIENT MESSAGE (OUTPATIENT)
Dept: HEMATOLOGY | Age: 53
End: 2023-11-06

## 2023-11-06 DIAGNOSIS — R53.83 FATIGUE DUE TO TREATMENT: Primary | ICD-10-CM

## 2023-11-06 DIAGNOSIS — Z83.2 FAMILY HISTORY OF FACTOR V LEIDEN MUTATION: Primary | ICD-10-CM

## 2023-11-06 DIAGNOSIS — I82.4Z3 DVT, LOWER EXTREMITY, DISTAL, ACUTE, BILATERAL (HCC): Primary | ICD-10-CM

## 2023-11-06 DIAGNOSIS — I82.4Z3 DVT, LOWER EXTREMITY, DISTAL, ACUTE, BILATERAL (HCC): ICD-10-CM

## 2023-11-06 DIAGNOSIS — C64.9 RENAL CELL CARCINOMA OF KIDNEY EXCLUDING RENAL PELVIS (HCC): ICD-10-CM

## 2023-11-06 DIAGNOSIS — Z79.01 CHRONIC ANTICOAGULATION: ICD-10-CM

## 2023-11-06 RX ORDER — APIXABAN 5 MG/1
5 TABLET, FILM COATED ORAL 2 TIMES DAILY
Qty: 60 TABLET | Refills: 0 | Status: SHIPPED | OUTPATIENT
Start: 2023-11-06

## 2023-11-06 NOTE — TELEPHONE ENCOUNTER
From: Petra Lacy  To: Dr. Joyce Layton: 11/6/2023 10:33 AM CST  Subject: History of [address] It is confirmed that I have a family history of Factor Five Lieden. Have I ever been tested for it? Does it affect any of my treatments? It has been several weeks since my KIDNEY/CANCER surgery and my legs are still severely swollen! As you may know I was admitted to the hospital with blood clots throughout my legs? PLEASE SHARE WITH ALL MY [facility] DOCTORS TO MAKE SURE ALL ARE AWARE AND WORKING TO RESOLVE THIS SWELLING ISSUE.    THANK YOU IN ADVANCE!! Please return immediately to the ER with ANY new or worsening symptoms such as fever, chills, chest pain, shortness of breath, lightheadedness, headache, nausea, vomiting, abdominal pain, or inability to eat or drink. If you feel as if your condition has worsened please return back to the ER for a recheck.     Please follow up with your primary care doctor in the next 1-3 days. Call tomorrow for an appointment. Also, if recommended to see a specialist please call tomorrow to schedule appointment.     If you were prescribed narcotics(pain pills) or sedating medications do not drive or take care of children while taking these medications, and do not take more often than prescribed.     If you have any difficulty obtaining medications prescribed to you, especially antibiotics, then please have your pharmacist call the ER for further assistance.     Call your doctor to discuss potential medication interactions.

## 2023-11-06 NOTE — TELEPHONE ENCOUNTER
Requested Prescriptions     Pending Prescriptions Disp Refills    ELIQUIS 5 MG TABS tablet [Pharmacy Med Name: Eliquis 5 MG Oral Tablet] 60 tablet 0     Sig: Take 1 tablet by mouth twice daily       Last Office Visit: Visit date not found  Next Office Visit: Visit date not found  Last Medication Refill: 9/8/2023 with 1 RF

## 2023-11-10 ENCOUNTER — HOSPITAL ENCOUNTER (OUTPATIENT)
Dept: GENERAL RADIOLOGY | Age: 53
Discharge: HOME | End: 2023-11-10
Attending: INTERNAL MEDICINE
Payer: COMMERCIAL

## 2023-11-10 DIAGNOSIS — Z79.01 CHRONIC ANTICOAGULATION: ICD-10-CM

## 2023-11-10 DIAGNOSIS — I82.4Z3 DVT, LOWER EXTREMITY, DISTAL, ACUTE, BILATERAL (HCC): ICD-10-CM

## 2023-11-10 PROCEDURE — 93970 EXTREMITY STUDY: CPT

## 2023-11-13 ENCOUNTER — HOSPITAL ENCOUNTER (OUTPATIENT)
Dept: INFUSION THERAPY | Age: 53
Discharge: HOME | End: 2023-11-13

## 2023-11-13 DIAGNOSIS — I82.4Z3 DVT, LOWER EXTREMITY, DISTAL, ACUTE, BILATERAL (HCC): Primary | ICD-10-CM

## 2023-11-13 DIAGNOSIS — I82.220 TUMOR OF RIGHT KIDNEY WITH THROMBUS OF IVC (HCC): ICD-10-CM

## 2023-11-13 DIAGNOSIS — D49.511 TUMOR OF RIGHT KIDNEY WITH THROMBUS OF IVC (HCC): ICD-10-CM

## 2023-11-15 ENCOUNTER — OFFICE VISIT (OUTPATIENT)
Dept: VASCULAR SURGERY | Age: 53
End: 2023-11-15
Payer: COMMERCIAL

## 2023-11-15 VITALS
HEART RATE: 78 BPM | TEMPERATURE: 97.7 F | SYSTOLIC BLOOD PRESSURE: 132 MMHG | DIASTOLIC BLOOD PRESSURE: 78 MMHG | OXYGEN SATURATION: 99 %

## 2023-11-15 DIAGNOSIS — M79.89 LEG SWELLING: ICD-10-CM

## 2023-11-15 DIAGNOSIS — M79.604 LEG PAIN, RIGHT: ICD-10-CM

## 2023-11-15 DIAGNOSIS — I82.413 ACUTE DEEP VEIN THROMBOSIS (DVT) OF FEMORAL VEIN OF BOTH LOWER EXTREMITIES (HCC): Primary | ICD-10-CM

## 2023-11-15 DIAGNOSIS — M79.605 LEG PAIN, LEFT: ICD-10-CM

## 2023-11-15 PROCEDURE — 3078F DIAST BP <80 MM HG: CPT | Performed by: NURSE PRACTITIONER

## 2023-11-15 PROCEDURE — 3074F SYST BP LT 130 MM HG: CPT | Performed by: NURSE PRACTITIONER

## 2023-11-15 PROCEDURE — 99203 OFFICE O/P NEW LOW 30 MIN: CPT | Performed by: NURSE PRACTITIONER

## 2023-11-16 DIAGNOSIS — M79.605 LEG PAIN, LEFT: Primary | ICD-10-CM

## 2023-11-16 DIAGNOSIS — M79.604 LEG PAIN, RIGHT: ICD-10-CM

## 2023-11-16 DIAGNOSIS — M79.89 LEG SWELLING: ICD-10-CM

## 2023-11-17 ENCOUNTER — OFFICE VISIT (OUTPATIENT)
Dept: HEMATOLOGY | Age: 53
End: 2023-11-17
Payer: COMMERCIAL

## 2023-11-17 ENCOUNTER — HOSPITAL ENCOUNTER (OUTPATIENT)
Dept: INFUSION THERAPY | Age: 53
Discharge: HOME | End: 2023-11-17
Payer: COMMERCIAL

## 2023-11-17 ENCOUNTER — HOSPITAL ENCOUNTER (OUTPATIENT)
Dept: INFUSION THERAPY | Age: 53
Setting detail: INFUSION SERIES
Discharge: HOME | End: 2023-11-17
Payer: COMMERCIAL

## 2023-11-17 VITALS
TEMPERATURE: 97.4 F | WEIGHT: 278 LBS | HEIGHT: 72 IN | OXYGEN SATURATION: 99 % | SYSTOLIC BLOOD PRESSURE: 142 MMHG | BODY MASS INDEX: 37.65 KG/M2 | HEART RATE: 72 BPM | DIASTOLIC BLOOD PRESSURE: 70 MMHG

## 2023-11-17 VITALS
OXYGEN SATURATION: 100 % | RESPIRATION RATE: 18 BRPM | DIASTOLIC BLOOD PRESSURE: 81 MMHG | SYSTOLIC BLOOD PRESSURE: 150 MMHG | TEMPERATURE: 96.8 F | HEART RATE: 75 BPM

## 2023-11-17 DIAGNOSIS — Z51.11 CHEMOTHERAPY MANAGEMENT, ENCOUNTER FOR: ICD-10-CM

## 2023-11-17 DIAGNOSIS — E86.0 DEHYDRATION: ICD-10-CM

## 2023-11-17 DIAGNOSIS — C64.9 RENAL CELL CARCINOMA OF KIDNEY EXCLUDING RENAL PELVIS (HCC): ICD-10-CM

## 2023-11-17 DIAGNOSIS — Z71.89 CARE PLAN DISCUSSED WITH PATIENT: ICD-10-CM

## 2023-11-17 DIAGNOSIS — Z51.12 MAINTENANCE ANTINEOPLASTIC IMMUNOTHERAPY: ICD-10-CM

## 2023-11-17 DIAGNOSIS — R79.89 ELEVATED SERUM CREATININE: ICD-10-CM

## 2023-11-17 DIAGNOSIS — C64.9 RENAL CELL CARCINOMA OF KIDNEY EXCLUDING RENAL PELVIS (HCC): Primary | ICD-10-CM

## 2023-11-17 DIAGNOSIS — R53.83 FATIGUE DUE TO TREATMENT: ICD-10-CM

## 2023-11-17 DIAGNOSIS — I82.4Z3 DVT, LOWER EXTREMITY, DISTAL, ACUTE, BILATERAL (HCC): ICD-10-CM

## 2023-11-17 LAB
ALBUMIN SERPL-MCNC: 4.1 G/DL (ref 3.5–5.2)
ALP SERPL-CCNC: 94 U/L (ref 40–130)
ALT SERPL-CCNC: 22 U/L (ref 21–72)
ANION GAP SERPL CALCULATED.3IONS-SCNC: 10 MMOL/L (ref 7–19)
AST SERPL-CCNC: 22 U/L (ref 17–59)
BASOPHILS # BLD: 0.05 K/UL (ref 0.01–0.08)
BASOPHILS NFR BLD: 0.8 % (ref 0.1–1.2)
BILIRUB SERPL-MCNC: 1.2 MG/DL (ref 0.2–1.3)
BUN SERPL-MCNC: 20 MG/DL (ref 9–20)
CALCIUM SERPL-MCNC: 9.4 MG/DL (ref 8.4–10.2)
CHLORIDE SERPL-SCNC: 98 MMOL/L (ref 98–111)
CO2 SERPL-SCNC: 29 MMOL/L (ref 22–29)
CORTIS PM SERPL-MCNC: 7 UG/DL (ref 2.3–11.9)
CREAT SERPL-MCNC: 1.5 MG/DL (ref 0.6–1.2)
EOSINOPHIL # BLD: 0.3 K/UL (ref 0.04–0.54)
EOSINOPHIL NFR BLD: 5 % (ref 0.7–7)
ERYTHROCYTE [DISTWIDTH] IN BLOOD BY AUTOMATED COUNT: 15.3 % (ref 11.6–14.4)
GLOBULIN: 3.7 G/DL
GLUCOSE SERPL-MCNC: 112 MG/DL (ref 74–106)
HCT VFR BLD AUTO: 40.2 % (ref 40.1–51)
HGB BLD-MCNC: 13.1 G/DL (ref 13.7–17.5)
LYMPHOCYTES # BLD: 1.3 K/UL (ref 1.18–3.74)
LYMPHOCYTES NFR BLD: 21.6 % (ref 19.3–53.1)
MCH RBC QN AUTO: 27.6 PG (ref 25.7–32.2)
MCHC RBC AUTO-ENTMCNC: 32.6 G/DL (ref 32.3–36.5)
MCV RBC AUTO: 84.6 FL (ref 79–92.2)
MONOCYTES # BLD: 0.49 K/UL (ref 0.24–0.82)
MONOCYTES NFR BLD: 8.1 % (ref 4.7–12.5)
NEUTROPHILS # BLD: 3.87 K/UL (ref 1.56–6.13)
NEUTS SEG NFR BLD: 64.2 % (ref 34–71.1)
PLATELET # BLD AUTO: 178 K/UL (ref 163–337)
PMV BLD AUTO: 10.8 FL (ref 7.4–10.4)
POTASSIUM SERPL-SCNC: 5 MMOL/L (ref 3.5–5.1)
PROT SERPL-MCNC: 7.7 G/DL (ref 6.3–8.2)
RBC # BLD AUTO: 4.75 M/UL (ref 4.63–6.08)
SODIUM SERPL-SCNC: 137 MMOL/L (ref 137–145)
T4 FREE SERPL-MCNC: 0.94 NG/DL (ref 0.93–1.7)
TSH SERPL DL<=0.005 MIU/L-ACNC: 6.18 UIU/ML (ref 0.27–4.2)
WBC # BLD AUTO: 6.03 K/UL (ref 4.23–9.07)

## 2023-11-17 PROCEDURE — 6360000002 HC RX W HCPCS: Performed by: INTERNAL MEDICINE

## 2023-11-17 PROCEDURE — 3078F DIAST BP <80 MM HG: CPT | Performed by: INTERNAL MEDICINE

## 2023-11-17 PROCEDURE — 76937 US GUIDE VASCULAR ACCESS: CPT

## 2023-11-17 PROCEDURE — 36410 VNPNXR 3YR/> PHY/QHP DX/THER: CPT

## 2023-11-17 PROCEDURE — 85025 COMPLETE CBC W/AUTO DIFF WBC: CPT

## 2023-11-17 PROCEDURE — 36415 COLL VENOUS BLD VENIPUNCTURE: CPT

## 2023-11-17 PROCEDURE — 3074F SYST BP LT 130 MM HG: CPT | Performed by: INTERNAL MEDICINE

## 2023-11-17 PROCEDURE — 96368 THER/DIAG CONCURRENT INF: CPT

## 2023-11-17 PROCEDURE — 2580000003 HC RX 258: Performed by: INTERNAL MEDICINE

## 2023-11-17 PROCEDURE — 96413 CHEMO IV INFUSION 1 HR: CPT

## 2023-11-17 PROCEDURE — 99214 OFFICE O/P EST MOD 30 MIN: CPT | Performed by: INTERNAL MEDICINE

## 2023-11-17 PROCEDURE — 80053 COMPREHEN METABOLIC PANEL: CPT

## 2023-11-17 RX ORDER — SODIUM CHLORIDE 0.9 % (FLUSH) 0.9 %
5-40 SYRINGE (ML) INJECTION PRN
Status: CANCELLED | OUTPATIENT
Start: 2023-11-17

## 2023-11-17 RX ORDER — ACETAMINOPHEN 325 MG/1
650 TABLET ORAL
OUTPATIENT
Start: 2023-11-17

## 2023-11-17 RX ORDER — SODIUM CHLORIDE 9 MG/ML
5-250 INJECTION, SOLUTION INTRAVENOUS PRN
Status: CANCELLED | OUTPATIENT
Start: 2023-11-17

## 2023-11-17 RX ORDER — 0.9 % SODIUM CHLORIDE 0.9 %
1000 INTRAVENOUS SOLUTION INTRAVENOUS ONCE
Status: COMPLETED | OUTPATIENT
Start: 2023-11-17 | End: 2023-11-17

## 2023-11-17 RX ORDER — SODIUM CHLORIDE 0.9 % (FLUSH) 0.9 %
5-40 SYRINGE (ML) INJECTION PRN
OUTPATIENT
Start: 2023-11-17

## 2023-11-17 RX ORDER — SODIUM CHLORIDE 9 MG/ML
5-250 INJECTION, SOLUTION INTRAVENOUS PRN
OUTPATIENT
Start: 2023-11-17

## 2023-11-17 RX ORDER — EPINEPHRINE 1 MG/ML
0.3 INJECTION, SOLUTION, CONCENTRATE INTRAVENOUS PRN
OUTPATIENT
Start: 2023-11-17

## 2023-11-17 RX ORDER — MEPERIDINE HYDROCHLORIDE 50 MG/ML
12.5 INJECTION INTRAMUSCULAR; INTRAVENOUS; SUBCUTANEOUS PRN
OUTPATIENT
Start: 2023-11-17

## 2023-11-17 RX ORDER — DIPHENHYDRAMINE HYDROCHLORIDE 50 MG/ML
50 INJECTION INTRAMUSCULAR; INTRAVENOUS
OUTPATIENT
Start: 2023-11-17

## 2023-11-17 RX ORDER — HEPARIN SODIUM (PORCINE) LOCK FLUSH IV SOLN 100 UNIT/ML 100 UNIT/ML
500 SOLUTION INTRAVENOUS PRN
OUTPATIENT
Start: 2023-11-17

## 2023-11-17 RX ORDER — ONDANSETRON 2 MG/ML
8 INJECTION INTRAMUSCULAR; INTRAVENOUS
OUTPATIENT
Start: 2023-11-17

## 2023-11-17 RX ORDER — 0.9 % SODIUM CHLORIDE 0.9 %
1000 INTRAVENOUS SOLUTION INTRAVENOUS ONCE
OUTPATIENT
Start: 2023-11-17 | End: 2023-11-17

## 2023-11-17 RX ORDER — SODIUM CHLORIDE 0.9 % (FLUSH) 0.9 %
5-40 SYRINGE (ML) INJECTION PRN
Status: DISCONTINUED | OUTPATIENT
Start: 2023-11-17 | End: 2023-11-18 | Stop reason: HOSPADM

## 2023-11-17 RX ORDER — SODIUM CHLORIDE 9 MG/ML
5-250 INJECTION, SOLUTION INTRAVENOUS PRN
Status: DISCONTINUED | OUTPATIENT
Start: 2023-11-17 | End: 2023-11-18 | Stop reason: HOSPADM

## 2023-11-17 RX ORDER — HEPARIN 100 UNIT/ML
500 SYRINGE INTRAVENOUS PRN
OUTPATIENT
Start: 2023-11-17

## 2023-11-17 RX ORDER — ALBUTEROL SULFATE 90 UG/1
4 AEROSOL, METERED RESPIRATORY (INHALATION) PRN
OUTPATIENT
Start: 2023-11-17

## 2023-11-17 RX ORDER — FAMOTIDINE 10 MG/ML
20 INJECTION, SOLUTION INTRAVENOUS
OUTPATIENT
Start: 2023-11-17

## 2023-11-17 RX ORDER — SODIUM CHLORIDE 9 MG/ML
INJECTION, SOLUTION INTRAVENOUS CONTINUOUS
OUTPATIENT
Start: 2023-11-17

## 2023-11-17 RX ADMIN — SODIUM CHLORIDE 200 MG: 9 INJECTION, SOLUTION INTRAVENOUS at 15:29

## 2023-11-17 RX ADMIN — SODIUM CHLORIDE 1000 ML: 9 INJECTION, SOLUTION INTRAVENOUS at 15:26

## 2023-11-17 RX ADMIN — SODIUM CHLORIDE 20 ML/HR: 9 INJECTION, SOLUTION INTRAVENOUS at 15:21

## 2023-11-17 NOTE — PROGRESS NOTES
Multiple IV attempts. PIC placed by Sindhu Wilcox RN using [address] guidance. 1L NS given. Keytruda 200 mg given. Pt tolerated well. Blood return checked and present before, during, and after infusion.

## 2023-11-17 NOTE — PROCEDURES
Placed a 25 gauge ultrasound guided peripheral IV in the patient's left upper arm (cephalic vein) with one attempt and no complications. Patient tolerated well. Blood return present. Prior to this nurse's arrive, there were 4 attempts at obtaining IV access by [facility] RNs. The patient has a history of requiring multiple IV start attempts and has been encouraged to discuss with doctor about having a port placed. Patient seemingly unsure of the procedure, however upon ultrasound assessment of both upper extremities, there was only one compressible vein that was suitable for IV access - which is the one that was used. All other veins were either non-compressible or too small in diameter to safely cannulate.      Electronically signed by Monse Otto RN on 11/17/2023 at 3:29 PM

## 2023-11-17 NOTE — DISCHARGE INSTRUCTIONS
pembrolizumab  Pronunciation:  PEM brogurmeet BANUELOS ue mab  Brand:  Idalmis Woodruffxley  What is the most important information I should know about pembrolizumab? Pembrolizumab can cause serious or life-threatening side effects. Some side effects may need to be treated with other medicine, and your cancer treatments may be delayed. You will need frequent medical tests to help your doctor determine if it is safe for you to keep receiving pembrolizumab. Call your doctor at once if you have: skin problems, vision problems, fever, swollen glands, neck stiffness, chest pain, cough, shortness of breath, muscle or joint pain, pale skin, weakness, diarrhea, severe stomach pain, blood in your stools, bruising or bleeding, dark urine, yellowing of the skin or eyes, a hormonal disorder (frequent headaches, feeling light-headed, rapid heartbeats, a deeper voice, increased thirst or urination, feeling cold, weight gain or loss), or a change in the amount or color of your urine. What is pembrolizumab? Pembrolizumab is a cancer medicine that is used alone or in combination with other medicines to treat certain types of cancer such as:  skin cancer (melanoma, Merkel cell carcinoma, squamous cell carcinoma);  lung cancer;  head and neck cancer;  classical Hodgkin lymphoma;  primary mediastinal large B-cell lymphoma;  cancer of the kidney, bladder, and urinary tract;  colorectal cancer;  liver cancer;  triple-negative breast cancer;  cancer of the cervix or uterus;  advanced stomach or esophageal cancer; or  a type of cancer that laboratory testing proves to have certain specific DNA mutations. Pembrolizumab is often given when the cancer has spread to other parts of the body or cannot be treated with surgery or radiation, or when other cancer treatments did not work or have stopped working.   For some types of cancer, pembrolizumab is given only if your tumor tests positive for \"PD-L1\", or if the tumor has been tested for a specific genetic this service as a supplement to, and not a substitute for, the expertise, skill, knowledge and judgment of healthcare practitioners. The absence of a warning for a given drug or drug combination in no way should be construed to indicate that the drug or drug combination is safe, effective or appropriate for any given patient. [facility] does not assume any responsibility for any aspect of healthcare administered with the aid of information [facility] provides. The information contained herein is not intended to cover all possible uses, directions, precautions, warnings, drug interactions, allergic reactions, or adverse effects. If you have questions about the drugs you are taking, check with your doctor, nurse or pharmacist.  Copyright 3284-3419 [address]: 14.01. Revision date: 3/29/2021. Care instructions adapted under license by [address]. If you have questions about a medical condition or this instruction, always ask your healthcare professional. [address] any warranty or liability for your use of this information.

## 2023-11-30 ENCOUNTER — TELEPHONE (OUTPATIENT)
Dept: VASCULAR SURGERY | Age: 53
End: 2023-11-30

## 2023-11-30 NOTE — TELEPHONE ENCOUNTER
Contacted the patient to let him know the following. The patient acknowledged.              Please call patient and let him know ivc was stapled closed as he recalls during surgery and the notes are in his chart for everyone to see

## 2023-12-01 DIAGNOSIS — E78.5 HYPERLIPIDEMIA, UNSPECIFIED HYPERLIPIDEMIA TYPE: ICD-10-CM

## 2023-12-01 RX ORDER — APIXABAN 5 MG/1
5 TABLET, FILM COATED ORAL 2 TIMES DAILY
Qty: 60 TABLET | Refills: 0 | OUTPATIENT
Start: 2023-12-01

## 2023-12-01 NOTE — TELEPHONE ENCOUNTER
Requested Prescriptions     Pending Prescriptions Disp Refills    ELIQUIS 5 MG TABS tablet [Pharmacy Med Name: Eliquis 5 MG Oral Tablet] 60 tablet 0     Sig: Take 1 tablet by mouth twice daily       Last Office Visit: Visit date not found  Next Office Visit: Visit date not found  Last Medication Refill: 11/6/2023 with 0 [facility] patient

## 2023-12-03 ENCOUNTER — PATIENT MESSAGE (OUTPATIENT)
Dept: HEMATOLOGY | Age: 53
End: 2023-12-03

## 2023-12-04 RX ORDER — APIXABAN 5 MG/1
5 TABLET, FILM COATED ORAL 2 TIMES DAILY
Qty: 60 TABLET | Refills: 0 | OUTPATIENT
Start: 2023-12-04

## 2023-12-04 RX ORDER — ATORVASTATIN CALCIUM 80 MG/1
TABLET, FILM COATED ORAL
Qty: 30 TABLET | Refills: 0 | Status: SHIPPED | OUTPATIENT
Start: 2023-12-04 | End: 2023-12-20

## 2023-12-04 NOTE — TELEPHONE ENCOUNTER
From: Franklin Neil  To: Dr. Noemí Jay: 12/3/2023 8:52 PM CST  Subject: Factor 5 test    Hi did we get the results?   Thanks

## 2023-12-08 ENCOUNTER — HOSPITAL ENCOUNTER (OUTPATIENT)
Dept: INFUSION THERAPY | Age: 53
Setting detail: INFUSION SERIES
Discharge: HOME | End: 2023-12-08
Payer: COMMERCIAL

## 2023-12-08 VITALS
WEIGHT: 267 LBS | SYSTOLIC BLOOD PRESSURE: 127 MMHG | DIASTOLIC BLOOD PRESSURE: 77 MMHG | RESPIRATION RATE: 18 BRPM | BODY MASS INDEX: 36.21 KG/M2 | OXYGEN SATURATION: 97 % | HEART RATE: 97 BPM | TEMPERATURE: 96.9 F

## 2023-12-08 DIAGNOSIS — E11.9 TYPE 2 DIABETES MELLITUS WITHOUT COMPLICATION, WITH LONG-TERM CURRENT USE OF INSULIN (HCC): ICD-10-CM

## 2023-12-08 DIAGNOSIS — C64.9 RENAL CELL CARCINOMA OF KIDNEY EXCLUDING RENAL PELVIS (HCC): Primary | ICD-10-CM

## 2023-12-08 DIAGNOSIS — Z79.4 TYPE 2 DIABETES MELLITUS WITHOUT COMPLICATION, WITH LONG-TERM CURRENT USE OF INSULIN (HCC): ICD-10-CM

## 2023-12-08 LAB
ALBUMIN SERPL-MCNC: 4.2 G/DL (ref 3.5–5.2)
ALP SERPL-CCNC: 80 U/L (ref 40–130)
ALT SERPL-CCNC: 13 U/L (ref 5–41)
ANION GAP SERPL CALCULATED.3IONS-SCNC: 9 MMOL/L (ref 7–19)
AST SERPL-CCNC: 17 U/L (ref 5–40)
BASOPHILS # BLD: 0.1 K/UL (ref 0–0.2)
BASOPHILS NFR BLD: 1 % (ref 0–1)
BILIRUB SERPL-MCNC: 1 MG/DL (ref 0.2–1.2)
BUN SERPL-MCNC: 23 MG/DL (ref 6–20)
CALCIUM SERPL-MCNC: 8.7 MG/DL (ref 8.6–10)
CHLORIDE SERPL-SCNC: 107 MMOL/L (ref 98–111)
CO2 SERPL-SCNC: 24 MMOL/L (ref 22–29)
CREAT SERPL-MCNC: 1.5 MG/DL (ref 0.5–1.2)
EOSINOPHIL # BLD: 0.5 K/UL (ref 0–0.6)
EOSINOPHIL NFR BLD: 7.8 % (ref 0–5)
ERYTHROCYTE [DISTWIDTH] IN BLOOD BY AUTOMATED COUNT: 14.8 % (ref 11.5–14.5)
GLUCOSE SERPL-MCNC: 136 MG/DL (ref 74–109)
HCT VFR BLD AUTO: 39.4 % (ref 42–52)
HGB BLD-MCNC: 13 G/DL (ref 14–18)
IMM GRANULOCYTES # BLD: 0 K/UL
LYMPHOCYTES # BLD: 1.3 K/UL (ref 1.1–4.5)
LYMPHOCYTES NFR BLD: 23.1 % (ref 20–40)
MCH RBC QN AUTO: 29.2 PG (ref 27–31)
MCHC RBC AUTO-ENTMCNC: 33 G/DL (ref 33–37)
MCV RBC AUTO: 88.5 FL (ref 80–94)
MONOCYTES # BLD: 0.4 K/UL (ref 0–0.9)
MONOCYTES NFR BLD: 7.3 % (ref 0–10)
NEUTROPHILS # BLD: 3.5 K/UL (ref 1.5–7.5)
NEUTS SEG NFR BLD: 60.5 % (ref 50–65)
PLATELET # BLD AUTO: 164 K/UL (ref 130–400)
PMV BLD AUTO: 11 FL (ref 9.4–12.4)
POTASSIUM SERPL-SCNC: 4.1 MMOL/L (ref 3.5–5)
PROT SERPL-MCNC: 6.6 G/DL (ref 6.6–8.7)
RBC # BLD AUTO: 4.45 M/UL (ref 4.7–6.1)
SODIUM SERPL-SCNC: 140 MMOL/L (ref 136–145)
SPECIMEN SOURCE: NORMAL
WBC # BLD AUTO: 5.8 K/UL (ref 4.8–10.8)

## 2023-12-08 PROCEDURE — 96413 CHEMO IV INFUSION 1 HR: CPT

## 2023-12-08 PROCEDURE — 81241 F5 GENE: CPT

## 2023-12-08 PROCEDURE — 80053 COMPREHEN METABOLIC PANEL: CPT

## 2023-12-08 PROCEDURE — 85025 COMPLETE CBC W/AUTO DIFF WBC: CPT

## 2023-12-08 PROCEDURE — 6360000002 HC RX W HCPCS: Performed by: INTERNAL MEDICINE

## 2023-12-08 PROCEDURE — 2580000003 HC RX 258: Performed by: INTERNAL MEDICINE

## 2023-12-08 RX ORDER — FAMOTIDINE 10 MG/ML
20 INJECTION, SOLUTION INTRAVENOUS
OUTPATIENT
Start: 2023-12-08

## 2023-12-08 RX ORDER — ONDANSETRON 2 MG/ML
8 INJECTION INTRAMUSCULAR; INTRAVENOUS
OUTPATIENT
Start: 2023-12-08

## 2023-12-08 RX ORDER — ACETAMINOPHEN 325 MG/1
650 TABLET ORAL
OUTPATIENT
Start: 2023-12-08

## 2023-12-08 RX ORDER — MEPERIDINE HYDROCHLORIDE 50 MG/ML
12.5 INJECTION INTRAMUSCULAR; INTRAVENOUS; SUBCUTANEOUS PRN
OUTPATIENT
Start: 2023-12-08

## 2023-12-08 RX ORDER — SODIUM CHLORIDE 9 MG/ML
5-250 INJECTION, SOLUTION INTRAVENOUS PRN
Status: CANCELLED | OUTPATIENT
Start: 2023-12-08

## 2023-12-08 RX ORDER — SODIUM CHLORIDE 9 MG/ML
INJECTION, SOLUTION INTRAVENOUS CONTINUOUS
OUTPATIENT
Start: 2023-12-08

## 2023-12-08 RX ORDER — SODIUM CHLORIDE 9 MG/ML
5-250 INJECTION, SOLUTION INTRAVENOUS PRN
Status: DISCONTINUED | OUTPATIENT
Start: 2023-12-08 | End: 2023-12-09 | Stop reason: HOSPADM

## 2023-12-08 RX ORDER — SODIUM CHLORIDE 9 MG/ML
5-250 INJECTION, SOLUTION INTRAVENOUS PRN
OUTPATIENT
Start: 2023-12-08

## 2023-12-08 RX ORDER — ALBUTEROL SULFATE 90 UG/1
4 AEROSOL, METERED RESPIRATORY (INHALATION) PRN
OUTPATIENT
Start: 2023-12-08

## 2023-12-08 RX ORDER — HEPARIN SODIUM (PORCINE) LOCK FLUSH IV SOLN 100 UNIT/ML 100 UNIT/ML
500 SOLUTION INTRAVENOUS PRN
OUTPATIENT
Start: 2023-12-08

## 2023-12-08 RX ORDER — EPINEPHRINE 1 MG/ML
0.3 INJECTION, SOLUTION, CONCENTRATE INTRAVENOUS PRN
OUTPATIENT
Start: 2023-12-08

## 2023-12-08 RX ORDER — ONDANSETRON 2 MG/ML
8 INJECTION INTRAMUSCULAR; INTRAVENOUS
Status: CANCELLED | OUTPATIENT
Start: 2023-12-08

## 2023-12-08 RX ORDER — DIPHENHYDRAMINE HYDROCHLORIDE 50 MG/ML
50 INJECTION INTRAMUSCULAR; INTRAVENOUS
OUTPATIENT
Start: 2023-12-08

## 2023-12-08 RX ORDER — SODIUM CHLORIDE 0.9 % (FLUSH) 0.9 %
5-40 SYRINGE (ML) INJECTION PRN
Status: DISCONTINUED | OUTPATIENT
Start: 2023-12-08 | End: 2023-12-09 | Stop reason: HOSPADM

## 2023-12-08 RX ORDER — ONDANSETRON 2 MG/ML
8 INJECTION INTRAMUSCULAR; INTRAVENOUS
Status: DISCONTINUED | OUTPATIENT
Start: 2023-12-08 | End: 2023-12-09 | Stop reason: HOSPADM

## 2023-12-08 RX ORDER — SODIUM CHLORIDE 0.9 % (FLUSH) 0.9 %
5-40 SYRINGE (ML) INJECTION PRN
Status: CANCELLED | OUTPATIENT
Start: 2023-12-08

## 2023-12-08 RX ADMIN — SODIUM CHLORIDE 200 MG: 9 INJECTION, SOLUTION INTRAVENOUS at 15:41

## 2023-12-08 RX ADMIN — SODIUM CHLORIDE 20 ML/HR: 9 INJECTION, SOLUTION INTRAVENOUS at 15:39

## 2023-12-08 NOTE — TELEPHONE ENCOUNTER
Petra Lacy called to request a refill on his medication.       Last office visit : 11/1/2023   Next office visit : Visit date not found     Requested Prescriptions     Pending Prescriptions Disp Refills    insulin 70-30 (HUMULIN 70/30) (70-30) 100 UNIT per ML injection vial [Pharmacy Med Name: HumuLIN 70/30 (70-30) 100 UNIT/ML Subcutaneous Suspension] 60 mL 0     Sig: INJECT 100 UNITS SUBCUTANEOUSLY TWICE DAILY            Guillermo Lozada, [facility]

## 2023-12-08 NOTE — PROGRESS NOTES
Notified Justo Santos with [facility] that pt is refusing 1L NS fluids. Pt states when he receives fluid he goes into fluid overload and he has to take bumex.  Justo Santos stated she will notify [facility]

## 2023-12-08 NOTE — PROGRESS NOTES
Lab Results   Component Value Date    WBC 6.03 11/17/2023    HGB 13.1 (L) 11/17/2023    HCT 40.2 11/17/2023    MCV 84.6 11/17/2023     11/17/2023     Lab Results   Component Value Date    NEUTROABS 3.87 11/17/2023     Lab Results   Component Value Date     11/17/2023    K 5.0 11/17/2023    CL 98 11/17/2023    CO2 29 11/17/2023    BUN 20 11/17/2023    CREATININE 1.5 (H) 11/17/2023    GLUCOSE 112 (H) 11/17/2023    CALCIUM 9.4 11/17/2023    PROT 7.7 11/17/2023    LABALBU 4.1 11/17/2023    BILITOT 1.2 11/17/2023    ALKPHOS 94 11/17/2023    AST 22 11/17/2023    ALT 22 11/17/2023    LABGLOM 55 (A) 11/17/2023    GLOB 3.7 11/17/2023

## 2023-12-08 NOTE — DISCHARGE INSTRUCTIONS
pembrolizumab  Pronunciation:  LORI blade BANUELOS ue mab  Brand:  Clent Laughter  What is the most important information I should know about pembrolizumab? Pembrolizumab can cause serious or life-threatening side effects. Some side effects may need to be treated with other medicine, and your cancer treatments may be delayed. You will need frequent medical tests to help your doctor determine if it is safe for you to keep receiving pembrolizumab. Call your doctor at once if you have: skin problems, vision problems, fever, swollen glands, neck stiffness, chest pain, cough, shortness of breath, muscle or joint pain, pale skin, weakness, diarrhea, severe stomach pain, blood in your stools, bruising or bleeding, dark urine, yellowing of the skin or eyes, a hormonal disorder (frequent headaches, feeling light-headed, rapid heartbeats, a deeper voice, increased thirst or urination, feeling cold, weight gain or loss), or a change in the amount or color of your urine. What is pembrolizumab? Pembrolizumab is a cancer medicine that is used alone or in combination with other medicines to treat certain types of cancer such as:  skin cancer (melanoma, Merkel cell carcinoma, squamous cell carcinoma);  lung cancer;  head and neck cancer;  classical Hodgkin lymphoma;  primary mediastinal large B-cell lymphoma;  cancer of the kidney, bladder, and urinary tract;  colorectal cancer;  liver cancer;  triple-negative breast cancer;  cancer of the cervix or uterus;  advanced stomach or esophageal cancer; or  a type of cancer that laboratory testing proves to have certain specific DNA mutations. Pembrolizumab is often given when the cancer has spread to other parts of the body or cannot be treated with surgery or radiation, or when other cancer treatments did not work or have stopped working.   For some types of cancer, pembrolizumab is given only if your tumor tests positive for \"PD-L1\", or if the tumor has been tested for a specific genetic certain side effects. Common side effects (some are more likely with combination chemotherapy) may include:  nausea, vomiting, stomach pain, loss of appetite, diarrhea, constipation;  low sodium levels, abnormal liver or thyroid function tests;  fever, feeling weak or tired;  cough, hoarse voice, feeling short of breath;  itching, rash, hair loss;  increased blood pressure;  pain in your muscles, bones, or joints; or  soreness in or around your mouth, nose, eyes, throat, or vagina. This is not a complete list of side effects and others may occur. Call your doctor for medical advice about side effects. You may report side effects to FDA at 2-207-WJH-1140. What other drugs will affect pembrolizumab? Other drugs may affect pembrolizumab, including prescription and over-the-counter medicines, vitamins, and herbal products. Tell your doctor about all your current medicines and any medicine you start or stop using. Where can I get more information? Your pharmacist can provide more information about pembrolizumab. Remember, keep this and all other medicines out of the reach of children, never share your medicines with others, and use this medication only for the indication prescribed. Every effort has been made to ensure that the information provided by [address] [place] [address] is accurate, up-to-date, and complete, but no guarantee is made to that effect. Drug information contained herein may be time sensitive. [employer] information has been compiled for use by healthcare practitioners and consumers in the [facility] and therefore [employer] does not warrant that uses outside of the [facility] are appropriate, unless specifically indicated otherwise. [employer]'s drug information does not endorse drugs, diagnose patients or recommend therapy.  [employer] drug information is an informational resource designed to assist licensed healthcare practitioners in caring for their patients and/or to serve consumers viewing

## 2023-12-08 NOTE — PROGRESS NOTES
Pt arrived to [facility] and PIV placed. STAT labs drawn. Keytruda 200mg administered. Pt declined fluids at this time. Pt tolerated well.    Electronically signed by Jae Hannah RN on 12/8/2023 at 4:36 PM

## 2023-12-14 LAB
F5 P.R506Q BLD/T QL: ABNORMAL
SPECIMEN SOURCE: ABNORMAL

## 2023-12-20 RX ORDER — BUMETANIDE 1 MG/1
1 TABLET ORAL DAILY
Qty: 30 TABLET | Refills: 0 | OUTPATIENT
Start: 2023-12-20

## 2023-12-20 RX ORDER — APIXABAN 5 MG/1
5 TABLET, FILM COATED ORAL 2 TIMES DAILY
Qty: 60 TABLET | Refills: 0 | OUTPATIENT
Start: 2023-12-20

## 2023-12-20 NOTE — TELEPHONE ENCOUNTER
Requested Prescriptions     Pending Prescriptions Disp Refills    bumetanide (BUMEX) 1 MG tablet [Pharmacy Med Name: Bumetanide 1 MG Oral Tablet] 30 tablet 0     Sig: Take 1 tablet by mouth once daily    ELIQUIS 5 MG TABS tablet [Pharmacy Med Name: Eliquis 5 MG Oral Tablet] 60 tablet 0     Sig: Take 1 tablet by mouth twice daily       Last Office Visit: Visit date not found  Next Office Visit: Visit date not found  Last Medication Refill: 9/8/2023 with 3 RF & 11/6/2023 with 0 RF

## 2023-12-24 DIAGNOSIS — Z79.4 TYPE 2 DIABETES MELLITUS WITHOUT COMPLICATION, WITH LONG-TERM CURRENT USE OF INSULIN (HCC): ICD-10-CM

## 2023-12-24 DIAGNOSIS — E11.9 TYPE 2 DIABETES MELLITUS WITHOUT COMPLICATION, WITH LONG-TERM CURRENT USE OF INSULIN (HCC): ICD-10-CM

## 2023-12-26 RX ORDER — PROCHLORPERAZINE 25 MG/1
1 SUPPOSITORY RECTAL
Qty: 3 EACH | Refills: 2 | Status: SHIPPED | OUTPATIENT
Start: 2023-12-26 | End: 2023-12-28 | Stop reason: SDUPTHER

## 2023-12-27 RX ORDER — APIXABAN 5 MG/1
5 TABLET, FILM COATED ORAL 2 TIMES DAILY
Qty: 60 TABLET | Refills: 0 | Status: SHIPPED | OUTPATIENT
Start: 2023-12-27

## 2023-12-27 NOTE — TELEPHONE ENCOUNTER
Requested Prescriptions     Pending Prescriptions Disp Refills    apixaban (ELIQUIS) 5 MG TABS tablet 60 tablet 0     Sig: Take 1 tablet by mouth 2 times daily       Last Office Visit: Visit date not found  Next Office Visit: 12/26/2023  Last Medication Refill: 11/6/2023 with 0 RF

## 2023-12-27 NOTE — TELEPHONE ENCOUNTER
Requested Prescriptions     Pending Prescriptions Disp Refills    ELIQUIS 5 MG TABS tablet [Pharmacy Med Name: Eliquis 5 MG Oral Tablet] 60 tablet 0     Sig: Take 1 tablet by mouth twice daily       Last Office Visit: Visit date not found  Next Office Visit: Visit date not found  Last Medication Refill: 11/6/2023 with 0 RF

## 2023-12-28 DIAGNOSIS — Z79.4 TYPE 2 DIABETES MELLITUS WITHOUT COMPLICATION, WITH LONG-TERM CURRENT USE OF INSULIN (HCC): ICD-10-CM

## 2023-12-28 DIAGNOSIS — E11.9 TYPE 2 DIABETES MELLITUS WITHOUT COMPLICATION, WITH LONG-TERM CURRENT USE OF INSULIN (HCC): ICD-10-CM

## 2023-12-28 RX ORDER — PROCHLORPERAZINE 25 MG/1
1 SUPPOSITORY RECTAL
Qty: 3 EACH | Refills: 2 | Status: SHIPPED | OUTPATIENT
Start: 2023-12-28

## 2023-12-29 ENCOUNTER — HOSPITAL ENCOUNTER (OUTPATIENT)
Dept: INFUSION THERAPY | Age: 53
Setting detail: INFUSION SERIES
Discharge: HOME | End: 2023-12-29
Payer: COMMERCIAL

## 2023-12-29 VITALS
HEART RATE: 76 BPM | SYSTOLIC BLOOD PRESSURE: 132 MMHG | OXYGEN SATURATION: 98 % | RESPIRATION RATE: 18 BRPM | DIASTOLIC BLOOD PRESSURE: 78 MMHG | TEMPERATURE: 97.2 F

## 2023-12-29 DIAGNOSIS — C64.9 RENAL CELL CARCINOMA OF KIDNEY EXCLUDING RENAL PELVIS (HCC): Primary | ICD-10-CM

## 2023-12-29 LAB
ALBUMIN SERPL-MCNC: 3.9 G/DL (ref 3.5–5.2)
ALP SERPL-CCNC: 84 U/L (ref 40–130)
ALT SERPL-CCNC: 11 U/L (ref 5–41)
ANION GAP SERPL CALCULATED.3IONS-SCNC: 12 MMOL/L (ref 7–19)
AST SERPL-CCNC: 17 U/L (ref 5–40)
BASOPHILS # BLD: 0.1 K/UL (ref 0–0.2)
BASOPHILS NFR BLD: 0.7 % (ref 0–1)
BILIRUB SERPL-MCNC: 1.3 MG/DL (ref 0.2–1.2)
BUN SERPL-MCNC: 17 MG/DL (ref 6–20)
CALCIUM SERPL-MCNC: 8.6 MG/DL (ref 8.6–10)
CHLORIDE SERPL-SCNC: 105 MMOL/L (ref 98–111)
CO2 SERPL-SCNC: 25 MMOL/L (ref 22–29)
CORTIS PM SERPL-MCNC: 7.3 UG/DL (ref 2.3–11.9)
CREAT SERPL-MCNC: 1.2 MG/DL (ref 0.5–1.2)
EOSINOPHIL # BLD: 0.5 K/UL (ref 0–0.6)
EOSINOPHIL NFR BLD: 7.7 % (ref 0–5)
ERYTHROCYTE [DISTWIDTH] IN BLOOD BY AUTOMATED COUNT: 14.6 % (ref 11.5–14.5)
GLUCOSE SERPL-MCNC: 199 MG/DL (ref 74–109)
HCT VFR BLD AUTO: 41.5 % (ref 42–52)
HGB BLD-MCNC: 13.8 G/DL (ref 14–18)
IMM GRANULOCYTES # BLD: 0 K/UL
LYMPHOCYTES # BLD: 1.5 K/UL (ref 1.1–4.5)
LYMPHOCYTES NFR BLD: 22.2 % (ref 20–40)
MCH RBC QN AUTO: 29.4 PG (ref 27–31)
MCHC RBC AUTO-ENTMCNC: 33.3 G/DL (ref 33–37)
MCV RBC AUTO: 88.5 FL (ref 80–94)
MONOCYTES # BLD: 0.5 K/UL (ref 0–0.9)
MONOCYTES NFR BLD: 7.5 % (ref 0–10)
NEUTROPHILS # BLD: 4.2 K/UL (ref 1.5–7.5)
NEUTS SEG NFR BLD: 61.5 % (ref 50–65)
PLATELET # BLD AUTO: 148 K/UL (ref 130–400)
PMV BLD AUTO: 11 FL (ref 9.4–12.4)
POTASSIUM SERPL-SCNC: 4.4 MMOL/L (ref 3.5–5)
PROT SERPL-MCNC: 6.8 G/DL (ref 6.6–8.7)
RBC # BLD AUTO: 4.69 M/UL (ref 4.7–6.1)
SODIUM SERPL-SCNC: 142 MMOL/L (ref 136–145)
T4 FREE SERPL-MCNC: 1.01 NG/DL (ref 0.93–1.7)
TSH SERPL DL<=0.005 MIU/L-ACNC: 3.75 UIU/ML (ref 0.27–4.2)
WBC # BLD AUTO: 6.8 K/UL (ref 4.8–10.8)

## 2023-12-29 PROCEDURE — 6360000002 HC RX W HCPCS: Performed by: NURSE PRACTITIONER

## 2023-12-29 PROCEDURE — 84439 ASSAY OF FREE THYROXINE: CPT

## 2023-12-29 PROCEDURE — 96413 CHEMO IV INFUSION 1 HR: CPT

## 2023-12-29 PROCEDURE — 80053 COMPREHEN METABOLIC PANEL: CPT

## 2023-12-29 PROCEDURE — 82533 TOTAL CORTISOL: CPT

## 2023-12-29 PROCEDURE — 84443 ASSAY THYROID STIM HORMONE: CPT

## 2023-12-29 PROCEDURE — 2580000003 HC RX 258: Performed by: NURSE PRACTITIONER

## 2023-12-29 PROCEDURE — 85025 COMPLETE CBC W/AUTO DIFF WBC: CPT

## 2023-12-29 RX ORDER — SODIUM CHLORIDE 0.9 % (FLUSH) 0.9 %
5-40 SYRINGE (ML) INJECTION PRN
Status: DISCONTINUED | OUTPATIENT
Start: 2023-12-29 | End: 2023-12-30 | Stop reason: HOSPADM

## 2023-12-29 RX ORDER — HEPARIN SODIUM (PORCINE) LOCK FLUSH IV SOLN 100 UNIT/ML 100 UNIT/ML
500 SOLUTION INTRAVENOUS PRN
OUTPATIENT
Start: 2023-12-29

## 2023-12-29 RX ORDER — SODIUM CHLORIDE 9 MG/ML
5-250 INJECTION, SOLUTION INTRAVENOUS PRN
Status: DISCONTINUED | OUTPATIENT
Start: 2023-12-29 | End: 2023-12-30 | Stop reason: HOSPADM

## 2023-12-29 RX ORDER — SODIUM CHLORIDE 9 MG/ML
5-250 INJECTION, SOLUTION INTRAVENOUS PRN
OUTPATIENT
Start: 2023-12-29

## 2023-12-29 RX ORDER — DIPHENHYDRAMINE HYDROCHLORIDE 50 MG/ML
50 INJECTION INTRAMUSCULAR; INTRAVENOUS
OUTPATIENT
Start: 2023-12-29

## 2023-12-29 RX ORDER — MEPERIDINE HYDROCHLORIDE 50 MG/ML
12.5 INJECTION INTRAMUSCULAR; INTRAVENOUS; SUBCUTANEOUS PRN
OUTPATIENT
Start: 2023-12-29

## 2023-12-29 RX ORDER — FAMOTIDINE 10 MG/ML
20 INJECTION, SOLUTION INTRAVENOUS
OUTPATIENT
Start: 2023-12-29

## 2023-12-29 RX ORDER — SODIUM CHLORIDE 0.9 % (FLUSH) 0.9 %
5-40 SYRINGE (ML) INJECTION PRN
Status: CANCELLED | OUTPATIENT
Start: 2023-12-29

## 2023-12-29 RX ORDER — ACETAMINOPHEN 325 MG/1
650 TABLET ORAL
OUTPATIENT
Start: 2023-12-29

## 2023-12-29 RX ORDER — ONDANSETRON 2 MG/ML
8 INJECTION INTRAMUSCULAR; INTRAVENOUS
OUTPATIENT
Start: 2023-12-29

## 2023-12-29 RX ORDER — SODIUM CHLORIDE 9 MG/ML
INJECTION, SOLUTION INTRAVENOUS CONTINUOUS
OUTPATIENT
Start: 2023-12-29

## 2023-12-29 RX ORDER — ALBUTEROL SULFATE 90 UG/1
4 AEROSOL, METERED RESPIRATORY (INHALATION) PRN
OUTPATIENT
Start: 2023-12-29

## 2023-12-29 RX ORDER — EPINEPHRINE 1 MG/ML
0.3 INJECTION, SOLUTION, CONCENTRATE INTRAVENOUS PRN
OUTPATIENT
Start: 2023-12-29

## 2023-12-29 RX ORDER — SODIUM CHLORIDE 9 MG/ML
5-250 INJECTION, SOLUTION INTRAVENOUS PRN
Status: CANCELLED | OUTPATIENT
Start: 2023-12-29

## 2023-12-29 RX ADMIN — SODIUM CHLORIDE 20 ML/HR: 9 INJECTION, SOLUTION INTRAVENOUS at 16:26

## 2023-12-29 RX ADMIN — SODIUM CHLORIDE, PRESERVATIVE FREE 10 ML: 5 INJECTION INTRAVENOUS at 17:00

## 2023-12-29 RX ADMIN — SODIUM CHLORIDE 200 MG: 9 INJECTION, SOLUTION INTRAVENOUS at 16:27

## 2023-12-29 NOTE — PROGRESS NOTES
LABS DRAWN AND REVIEWED. KEYTRUDA  200MG GIVEN AS ORDERED AND PT TOLERATED WELL. EXCELLENT BLOOD RETURN NOTED BEFORE AND AFTER CHEMO INFUSION TO PERIPHERAL IV SITE.

## 2023-12-29 NOTE — DISCHARGE INSTRUCTIONS
pembrolizumab  Pronunciation:  PEM blade BANUELOS ue mab  Brand:  Keytruda  What is the most important information I should know about pembrolizumab?  Pembrolizumab can cause serious or life-threatening side effects. Some side effects may need to be treated with other medicine, and your cancer treatments may be delayed. You will need frequent medical tests to help your doctor determine if it is safe for you to keep receiving pembrolizumab.  Call your doctor at once if you have: skin problems, vision problems, fever, swollen glands, neck stiffness, chest pain, cough, shortness of breath, muscle or joint pain, pale skin, weakness, diarrhea, severe stomach pain, blood in your stools, bruising or bleeding, dark urine, yellowing of the skin or eyes, a hormonal disorder (frequent headaches, feeling light-headed, rapid heartbeats, a deeper voice, increased thirst or urination, feeling cold, weight gain or loss), or a change in the amount or color of your urine.  What is pembrolizumab?  Pembrolizumab is a cancer medicine that is used alone or in combination with other medicines to treat certain types of cancer such as:  skin cancer (melanoma, Merkel cell carcinoma, squamous cell carcinoma);  lung cancer;  head and neck cancer;  classical Hodgkin lymphoma;  primary mediastinal large B-cell lymphoma;  cancer of the kidney, bladder, and urinary tract;  colorectal cancer;  liver cancer;  triple-negative breast cancer;  cancer of the cervix or uterus;  advanced stomach or esophageal cancer; or  a type of cancer that laboratory testing proves to have certain specific DNA mutations.  Pembrolizumab is often given when the cancer has spread to other parts of the body or cannot be treated with surgery or radiation, or when other cancer treatments did not work or have stopped working.  For some types of cancer, pembrolizumab is given only if your tumor tests positive for \"PD-L1\", or if the tumor has been tested for a specific genetic  certain side effects. Common side effects (some are more likely with combination chemotherapy) may include:  nausea, vomiting, stomach pain, loss of appetite, diarrhea, constipation;  low sodium levels, abnormal liver or thyroid function tests;  fever, feeling weak or tired;  cough, hoarse voice, feeling short of breath;  itching, rash, hair loss;  increased blood pressure;  pain in your muscles, bones, or joints; or  soreness in or around your mouth, nose, eyes, throat, or vagina. This is not a complete list of side effects and others may occur. Call your doctor for medical advice about side effects. You may report side effects to FDA at 4-890-FGB-0249. What other drugs will affect pembrolizumab? Other drugs may affect pembrolizumab, including prescription and over-the-counter medicines, vitamins, and herbal products. Tell your doctor about all your current medicines and any medicine you start or stop using. Where can I get more information? Your pharmacist can provide more information about pembrolizumab. Remember, keep this and all other medicines out of the reach of children, never share your medicines with others, and use this medication only for the indication prescribed. Every effort has been made to ensure that the information provided by [address] [place] [address] is accurate, up-to-date, and complete, but no guarantee is made to that effect. Drug information contained herein may be time sensitive. [employer] information has been compiled for use by healthcare practitioners and consumers in the [facility] and therefore [employer] does not warrant that uses outside of the [facility] are appropriate, unless specifically indicated otherwise. "[employer]"s drug information does not endorse drugs, diagnose patients or recommend therapy.  "[employer]"s drug information is an informational resource designed to assist licensed healthcare practitioners in caring for their patients and/or to serve consumers viewing

## 2023-12-31 DIAGNOSIS — C64.9 RENAL CELL CARCINOMA OF KIDNEY EXCLUDING RENAL PELVIS (HCC): ICD-10-CM

## 2024-01-02 NOTE — TELEPHONE ENCOUNTER
Requested Prescriptions     Pending Prescriptions Disp Refills    bumetanide (BUMEX) 1 MG tablet [Pharmacy Med Name: Bumetanide 1 MG Oral Tablet] 30 tablet 0     Sig: Take 1 tablet by mouth once daily    pregabalin (LYRICA) 100 MG capsule [Pharmacy Med Name: Pregabalin 100 MG Oral Capsule] 60 capsule 0     Sig: Take 1 capsule by mouth 2 times daily for 30 days. Max Daily Amount: 200 mg       Last Office Visit:  Visit date not found  Next Office Visit:  Visit date not found  Last Medication Refill:  9/8/2023 with 3 NIKI Ennis up to date:  1/2/2024    *RX updated to reflect   1/2/2024  & 1/19/2024 fill date*

## 2024-01-03 RX ORDER — BUMETANIDE 1 MG/1
1 TABLET ORAL DAILY
Qty: 30 TABLET | Refills: 0 | Status: SHIPPED | OUTPATIENT
Start: 2024-01-03

## 2024-01-03 RX ORDER — PREGABALIN 100 MG/1
100 CAPSULE ORAL 2 TIMES DAILY
Qty: 60 CAPSULE | Refills: 0 | Status: SHIPPED | OUTPATIENT
Start: 2024-01-19 | End: 2024-02-18

## 2024-01-11 DIAGNOSIS — E11.9 TYPE 2 DIABETES MELLITUS WITHOUT COMPLICATION, WITH LONG-TERM CURRENT USE OF INSULIN (HCC): ICD-10-CM

## 2024-01-11 DIAGNOSIS — Z79.4 TYPE 2 DIABETES MELLITUS WITHOUT COMPLICATION, WITH LONG-TERM CURRENT USE OF INSULIN (HCC): ICD-10-CM

## 2024-01-17 ENCOUNTER — TELEPHONE (OUTPATIENT)
Dept: HEMATOLOGY | Age: 54
End: 2024-01-17

## 2024-01-17 DIAGNOSIS — R53.83 FATIGUE DUE TO TREATMENT: ICD-10-CM

## 2024-01-17 DIAGNOSIS — C64.9 RENAL CELL CARCINOMA OF KIDNEY EXCLUDING RENAL PELVIS (HCC): Primary | ICD-10-CM

## 2024-01-17 NOTE — TELEPHONE ENCOUNTER
Called patient and reminded patient of their appointment on 1/19/2024and patient confirmed they would be here

## 2024-01-17 NOTE — PROGRESS NOTES
MEDICAL ONCOLOGY PROGRESS NOTE                                                          Fracisco Reeves   1970 1/19/2024     Chief Complaint   Patient presents with    Follow-up     Renal cell carcinoma of kidney excluding renal pelvis       INTERVAL HISTORY/HISTORY OF PRESENT ILLNESS:  Reason for MD visit-toxicity assessment disease management  The patient presents today for cycle #6 of adjuvant pembrolizumab.  The patient has been tolerating treatment relatively well except for mild fatigue.  He has a mild rash lower extremity.  Denies any nausea vomit diarrhea.  Denies any new respiratory symptoms.  In addition, he has been diagnosed with bilateral DVT.  He is currently on Eliquis.  He denies any bleeding.  He is scheduled to be seen by vascular for repeat ultrasound next month.    Diagnosis  Renal cell carcinoma, clear-cell, Aug 2023  [id number], stage III  Provoked bilateral lower extremity DVT, Aug 2023     Treatment summary  8/15/23 UFH heparin-> Eliquis  8/22/2023 Right nephrectomy with IVC thrombectomyby Dr Lopez Wilkerson at [facility]  10/2/23 Initiated adjuvant Pembrolizumab 200mg every 3 weeks x17 cycles/1 year      Cancer history  Fracisco Reeves was first seen by me on 8/15/2023 during patient was positioned [facility].  The patient has a history of type 2 diabetes, hypertension hyperlipidemia, chronic back pain, LI with CPAP, history of depression.  He presented to the ER department at [facility] complaints of bilateral lower extremity swelling for about 6 weeks.  This is getting progressively worse.  In addition, complains of right flank pain.  Patient denies any hematuria.  Patient reports increased fatigue.  Laboratory work-up in the emergency remarkable for neutrophil leukocytosis, elevated creatinine/decreased GFR, mild elevation of bilirubin.  Imaging studies as below  8/15/2023-CT abdomen/pelvis w/o ([facility]):  Liver: Grossly unremarkable. Biliary: Gallstones are seen. No bile

## 2024-01-18 DIAGNOSIS — E78.5 HYPERLIPIDEMIA, UNSPECIFIED HYPERLIPIDEMIA TYPE: ICD-10-CM

## 2024-01-18 RX ORDER — ATORVASTATIN CALCIUM 80 MG/1
TABLET, FILM COATED ORAL
Qty: 30 TABLET | Refills: 0 | OUTPATIENT
Start: 2024-01-18

## 2024-01-18 RX ORDER — ATORVASTATIN CALCIUM 80 MG/1
TABLET, FILM COATED ORAL
Qty: 30 TABLET | Refills: 0 | Status: SHIPPED | OUTPATIENT
Start: 2024-01-18

## 2024-01-19 ENCOUNTER — HOSPITAL ENCOUNTER (OUTPATIENT)
Dept: INFUSION THERAPY | Age: 54
Setting detail: INFUSION SERIES
Discharge: HOME | End: 2024-01-19
Payer: COMMERCIAL

## 2024-01-19 ENCOUNTER — HOSPITAL ENCOUNTER (OUTPATIENT)
Dept: INFUSION THERAPY | Age: 54
Discharge: HOME | End: 2024-01-19
Payer: COMMERCIAL

## 2024-01-19 ENCOUNTER — OFFICE VISIT (OUTPATIENT)
Dept: HEMATOLOGY | Age: 54
End: 2024-01-19
Payer: COMMERCIAL

## 2024-01-19 VITALS
TEMPERATURE: 98.3 F | WEIGHT: 268.3 LBS | HEART RATE: 88 BPM | DIASTOLIC BLOOD PRESSURE: 80 MMHG | SYSTOLIC BLOOD PRESSURE: 130 MMHG | BODY MASS INDEX: 36.34 KG/M2 | HEIGHT: 72 IN | OXYGEN SATURATION: 96 %

## 2024-01-19 VITALS
HEART RATE: 73 BPM | RESPIRATION RATE: 18 BRPM | SYSTOLIC BLOOD PRESSURE: 135 MMHG | DIASTOLIC BLOOD PRESSURE: 75 MMHG | OXYGEN SATURATION: 100 % | TEMPERATURE: 97.7 F

## 2024-01-19 DIAGNOSIS — R53.83 FATIGUE DUE TO TREATMENT: ICD-10-CM

## 2024-01-19 DIAGNOSIS — L27.0 DRUG RASH: ICD-10-CM

## 2024-01-19 DIAGNOSIS — G62.0 CHEMOTHERAPY-INDUCED NEUROPATHY (HCC): ICD-10-CM

## 2024-01-19 DIAGNOSIS — C64.9 RENAL CELL CARCINOMA OF KIDNEY EXCLUDING RENAL PELVIS (HCC): Primary | ICD-10-CM

## 2024-01-19 DIAGNOSIS — Z71.89 CARE PLAN DISCUSSED WITH PATIENT: ICD-10-CM

## 2024-01-19 DIAGNOSIS — T45.1X5A CHEMOTHERAPY-INDUCED NEUROPATHY (HCC): ICD-10-CM

## 2024-01-19 DIAGNOSIS — Z51.11 CHEMOTHERAPY MANAGEMENT, ENCOUNTER FOR: ICD-10-CM

## 2024-01-19 DIAGNOSIS — C64.9 RENAL CELL CARCINOMA OF KIDNEY EXCLUDING RENAL PELVIS (HCC): ICD-10-CM

## 2024-01-19 DIAGNOSIS — D64.9 NORMOCYTIC ANEMIA: ICD-10-CM

## 2024-01-19 DIAGNOSIS — R79.89 ELEVATED SERUM CREATININE: ICD-10-CM

## 2024-01-19 DIAGNOSIS — E86.0 DEHYDRATION: ICD-10-CM

## 2024-01-19 LAB
ALBUMIN SERPL-MCNC: 4.3 G/DL (ref 3.5–5.2)
ALP SERPL-CCNC: 72 U/L (ref 40–130)
ALT SERPL-CCNC: 20 U/L (ref 21–72)
ANION GAP SERPL CALCULATED.3IONS-SCNC: 7 MMOL/L (ref 7–19)
AST SERPL-CCNC: 25 U/L (ref 17–59)
BASOPHILS # BLD: 0.06 K/UL (ref 0.01–0.08)
BASOPHILS NFR BLD: 0.7 % (ref 0.1–1.2)
BILIRUB SERPL-MCNC: 2 MG/DL (ref 0.2–1.3)
BUN SERPL-MCNC: 19 MG/DL (ref 9–20)
CALCIUM SERPL-MCNC: 8.7 MG/DL (ref 8.4–10.2)
CHLORIDE SERPL-SCNC: 108 MMOL/L (ref 98–111)
CO2 SERPL-SCNC: 26 MMOL/L (ref 22–29)
CORTIS AM PEAK SERPL-MCNC: 7.1 UG/DL (ref 6.2–19.4)
CREAT SERPL-MCNC: 1.3 MG/DL (ref 0.6–1.2)
EOSINOPHIL # BLD: 0.28 K/UL (ref 0.04–0.54)
EOSINOPHIL NFR BLD: 3.3 % (ref 0.7–7)
ERYTHROCYTE [DISTWIDTH] IN BLOOD BY AUTOMATED COUNT: 14.6 % (ref 11.6–14.4)
GLOBULIN: 3 G/DL
GLUCOSE SERPL-MCNC: 145 MG/DL (ref 74–106)
HCT VFR BLD AUTO: 40.3 % (ref 40.1–51)
HGB BLD-MCNC: 13.5 G/DL (ref 13.7–17.5)
LYMPHOCYTES # BLD: 1.52 K/UL (ref 1.18–3.74)
LYMPHOCYTES NFR BLD: 17.9 % (ref 19.3–53.1)
MCH RBC QN AUTO: 29.1 PG (ref 25.7–32.2)
MCHC RBC AUTO-ENTMCNC: 33.5 G/DL (ref 32.3–36.5)
MCV RBC AUTO: 86.9 FL (ref 79–92.2)
MONOCYTES # BLD: 0.5 K/UL (ref 0.24–0.82)
MONOCYTES NFR BLD: 5.9 % (ref 4.7–12.5)
NEUTROPHILS # BLD: 6.12 K/UL (ref 1.56–6.13)
NEUTS SEG NFR BLD: 72 % (ref 34–71.1)
PLATELET # BLD AUTO: 169 K/UL (ref 163–337)
PMV BLD AUTO: 11.1 FL (ref 7.4–10.4)
POTASSIUM SERPL-SCNC: 4 MMOL/L (ref 3.5–5.1)
PROT SERPL-MCNC: 7.3 G/DL (ref 6.3–8.2)
RBC # BLD AUTO: 4.64 M/UL (ref 4.63–6.08)
SODIUM SERPL-SCNC: 141 MMOL/L (ref 137–145)
T4 FREE SERPL-MCNC: 0.95 NG/DL (ref 0.93–1.7)
TSH SERPL DL<=0.005 MIU/L-ACNC: 3.78 UIU/ML (ref 0.27–4.2)
WBC # BLD AUTO: 8.5 K/UL (ref 4.23–9.07)

## 2024-01-19 PROCEDURE — 96413 CHEMO IV INFUSION 1 HR: CPT

## 2024-01-19 PROCEDURE — 3075F SYST BP GE 130 - 139MM HG: CPT | Performed by: INTERNAL MEDICINE

## 2024-01-19 PROCEDURE — 36415 COLL VENOUS BLD VENIPUNCTURE: CPT

## 2024-01-19 PROCEDURE — 3079F DIAST BP 80-89 MM HG: CPT | Performed by: INTERNAL MEDICINE

## 2024-01-19 PROCEDURE — 2580000003 HC RX 258: Performed by: INTERNAL MEDICINE

## 2024-01-19 PROCEDURE — 85025 COMPLETE CBC W/AUTO DIFF WBC: CPT

## 2024-01-19 PROCEDURE — 99214 OFFICE O/P EST MOD 30 MIN: CPT | Performed by: INTERNAL MEDICINE

## 2024-01-19 PROCEDURE — 6360000002 HC RX W HCPCS: Performed by: INTERNAL MEDICINE

## 2024-01-19 PROCEDURE — 80053 COMPREHEN METABOLIC PANEL: CPT

## 2024-01-19 RX ORDER — 0.9 % SODIUM CHLORIDE 0.9 %
1000 INTRAVENOUS SOLUTION INTRAVENOUS ONCE
Status: DISCONTINUED | OUTPATIENT
Start: 2024-01-19 | End: 2024-01-21 | Stop reason: HOSPADM

## 2024-01-19 RX ORDER — SODIUM CHLORIDE 9 MG/ML
INJECTION, SOLUTION INTRAVENOUS CONTINUOUS
Status: CANCELLED | OUTPATIENT
Start: 2024-01-19

## 2024-01-19 RX ORDER — HEPARIN SODIUM (PORCINE) LOCK FLUSH IV SOLN 100 UNIT/ML 100 UNIT/ML
500 SOLUTION INTRAVENOUS PRN
Status: CANCELLED | OUTPATIENT
Start: 2024-01-19

## 2024-01-19 RX ORDER — ACETAMINOPHEN 325 MG/1
650 TABLET ORAL
Status: CANCELLED | OUTPATIENT
Start: 2024-01-19

## 2024-01-19 RX ORDER — MEPERIDINE HYDROCHLORIDE 50 MG/ML
12.5 INJECTION INTRAMUSCULAR; INTRAVENOUS; SUBCUTANEOUS PRN
Status: CANCELLED | OUTPATIENT
Start: 2024-01-19

## 2024-01-19 RX ORDER — ONDANSETRON 2 MG/ML
8 INJECTION INTRAMUSCULAR; INTRAVENOUS
Status: CANCELLED | OUTPATIENT
Start: 2024-01-19

## 2024-01-19 RX ORDER — SODIUM CHLORIDE 0.9 % (FLUSH) 0.9 %
5-40 SYRINGE (ML) INJECTION PRN
Status: DISCONTINUED | OUTPATIENT
Start: 2024-01-19 | End: 2024-01-20 | Stop reason: HOSPADM

## 2024-01-19 RX ORDER — SODIUM CHLORIDE 9 MG/ML
5-250 INJECTION, SOLUTION INTRAVENOUS PRN
Status: DISCONTINUED | OUTPATIENT
Start: 2024-01-19 | End: 2024-01-20 | Stop reason: HOSPADM

## 2024-01-19 RX ORDER — ALBUTEROL SULFATE 90 UG/1
4 AEROSOL, METERED RESPIRATORY (INHALATION) PRN
Status: CANCELLED | OUTPATIENT
Start: 2024-01-19

## 2024-01-19 RX ORDER — EPINEPHRINE 1 MG/ML
0.3 INJECTION, SOLUTION, CONCENTRATE INTRAVENOUS PRN
Status: CANCELLED | OUTPATIENT
Start: 2024-01-19

## 2024-01-19 RX ORDER — SODIUM CHLORIDE 0.9 % (FLUSH) 0.9 %
5-40 SYRINGE (ML) INJECTION PRN
Status: CANCELLED | OUTPATIENT
Start: 2024-01-19

## 2024-01-19 RX ORDER — SODIUM CHLORIDE 9 MG/ML
5-250 INJECTION, SOLUTION INTRAVENOUS PRN
Status: CANCELLED | OUTPATIENT
Start: 2024-01-19

## 2024-01-19 RX ORDER — HEPARIN 100 UNIT/ML
500 SYRINGE INTRAVENOUS PRN
OUTPATIENT
Start: 2024-01-19

## 2024-01-19 RX ORDER — DIPHENHYDRAMINE HYDROCHLORIDE 50 MG/ML
50 INJECTION INTRAMUSCULAR; INTRAVENOUS
Status: CANCELLED | OUTPATIENT
Start: 2024-01-19

## 2024-01-19 RX ORDER — SODIUM CHLORIDE 0.9 % (FLUSH) 0.9 %
5-40 SYRINGE (ML) INJECTION PRN
OUTPATIENT
Start: 2024-01-19

## 2024-01-19 RX ORDER — FAMOTIDINE 10 MG/ML
20 INJECTION, SOLUTION INTRAVENOUS
Status: CANCELLED | OUTPATIENT
Start: 2024-01-19

## 2024-01-19 RX ORDER — 0.9 % SODIUM CHLORIDE 0.9 %
1000 INTRAVENOUS SOLUTION INTRAVENOUS ONCE
OUTPATIENT
Start: 2024-01-19 | End: 2024-01-19

## 2024-01-19 RX ADMIN — SODIUM CHLORIDE 200 MG: 9 INJECTION, SOLUTION INTRAVENOUS at 13:49

## 2024-01-19 RX ADMIN — SODIUM CHLORIDE 20 ML/HR: 9 INJECTION, SOLUTION INTRAVENOUS at 13:46

## 2024-01-19 RX ADMIN — SODIUM CHLORIDE, PRESERVATIVE FREE 10 ML: 5 INJECTION INTRAVENOUS at 14:20

## 2024-01-19 NOTE — PROGRESS NOTES
Cycle 6, Day 1 completed.  Labs drawn at MD office.  Keytruda 200 mg infused as ordered.  Brisk blood return noted before and after infusion.  Tolerated well.  This nurse spoke with DAQUAN Cr RN, who stated   [facility]'s future appts to take place [facility].  Pt informed and states understanding.

## 2024-01-19 NOTE — DISCHARGE INSTRUCTIONS
pembrolizumab  Pronunciation:  PEM blade BANUELOS ue mab  Brand:  Keytruda  What is the most important information I should know about pembrolizumab?  Pembrolizumab can cause serious or life-threatening side effects. Some side effects may need to be treated with other medicine, and your cancer treatments may be delayed. You will need frequent medical tests to help your doctor determine if it is safe for you to keep receiving pembrolizumab.  Call your doctor at once if you have: skin problems, vision problems, fever, swollen glands, neck stiffness, chest pain, cough, shortness of breath, muscle or joint pain, pale skin, weakness, diarrhea, severe stomach pain, blood in your stools, bruising or bleeding, dark urine, yellowing of the skin or eyes, a hormonal disorder (frequent headaches, feeling light-headed, rapid heartbeats, a deeper voice, increased thirst or urination, feeling cold, weight gain or loss), or a change in the amount or color of your urine.  What is pembrolizumab?  Pembrolizumab is a cancer medicine that is used alone or in combination with other medicines to treat certain types of cancer such as:  skin cancer (melanoma, Merkel cell carcinoma, squamous cell carcinoma);  lung cancer;  head and neck cancer;  classical Hodgkin lymphoma;  primary mediastinal large B-cell lymphoma;  cancer of the kidney, bladder, and urinary tract;  colorectal cancer;  liver cancer;  triple-negative breast cancer;  cancer of the cervix or uterus;  advanced stomach or esophageal cancer; or  a type of cancer that laboratory testing proves to have certain specific DNA mutations.  Pembrolizumab is often given when the cancer has spread to other parts of the body or cannot be treated with surgery or radiation, or when other cancer treatments did not work or have stopped working.  For some types of cancer, pembrolizumab is given only if your tumor tests positive for \"PD-L1\", or if the tumor has been tested for a specific genetic  certain side effects.  Common side effects (some are more likely with combination chemotherapy) may include:  nausea, vomiting, stomach pain, loss of appetite, diarrhea, constipation;  low sodium levels, abnormal liver or thyroid function tests;  fever, feeling weak or tired;  cough, hoarse voice, feeling short of breath;  itching, rash, hair loss;  increased blood pressure;  pain in your muscles, bones, or joints; or  soreness in or around your mouth, nose, eyes, throat, or vagina.  This is not a complete list of side effects and others may occur. Call your doctor for medical advice about side effects. You may report side effects to FDA at 7-894-KJQ-9992.  What other drugs will affect pembrolizumab?  Other drugs may affect pembrolizumab, including prescription and over-the-counter medicines, vitamins, and herbal products. Tell your doctor about all your current medicines and any medicine you start or stop using.  Where can I get more information?  Your pharmacist can provide more information about pembrolizumab.  Remember, keep this and all other medicines out of the reach of children, never share your medicines with others, and use this medication only for the indication prescribed.   Every effort has been made to ensure that the information provided by [employer]') is accurate, up-to-date, and complete, but no guarantee is made to that effect. Drug information contained herein may be time sensitive. [employer] information has been compiled for use by healthcare practitioners and consumers in the [place] and therefore [employer] does not warrant that uses outside of the [place] are appropriate, unless specifically indicated otherwise. [employer] drug information does not endorse drugs, diagnose patients or recommend therapy. [employer] drug information is an informational resource designed to assist licensed healthcare practitioners in caring for their patients and/or to serve consumers viewing

## 2024-01-21 DIAGNOSIS — Z79.4 TYPE 2 DIABETES MELLITUS WITHOUT COMPLICATION, WITH LONG-TERM CURRENT USE OF INSULIN (HCC): ICD-10-CM

## 2024-01-21 DIAGNOSIS — E11.9 TYPE 2 DIABETES MELLITUS WITHOUT COMPLICATION, WITH LONG-TERM CURRENT USE OF INSULIN (HCC): ICD-10-CM

## 2024-01-22 RX ORDER — GLUCOSAMINE HCL/CHONDROITIN SU 500-400 MG
CAPSULE ORAL
Qty: 100 STRIP | Refills: 2 | Status: SHIPPED | OUTPATIENT
Start: 2024-01-22

## 2024-02-08 ENCOUNTER — HOSPITAL ENCOUNTER (OUTPATIENT)
Dept: CT IMAGING | Age: 54
Discharge: HOME | End: 2024-02-08
Payer: COMMERCIAL

## 2024-02-08 DIAGNOSIS — C64.9 RENAL CELL CARCINOMA OF KIDNEY EXCLUDING RENAL PELVIS (HCC): ICD-10-CM

## 2024-02-08 PROCEDURE — 74176 CT ABD & PELVIS W/O CONTRAST: CPT

## 2024-02-08 PROCEDURE — 71250 CT THORAX DX C-: CPT

## 2024-02-09 ENCOUNTER — HOSPITAL ENCOUNTER (OUTPATIENT)
Dept: INFUSION THERAPY | Age: 54
Discharge: HOME | End: 2024-02-09

## 2024-02-12 ENCOUNTER — HOSPITAL ENCOUNTER (OUTPATIENT)
Dept: VASCULAR LAB | Age: 54
Discharge: HOME | End: 2024-02-12
Payer: COMMERCIAL

## 2024-02-12 ENCOUNTER — HOSPITAL ENCOUNTER (OUTPATIENT)
Dept: INFUSION THERAPY | Age: 54
Discharge: HOME | End: 2024-02-12
Payer: COMMERCIAL

## 2024-02-12 ENCOUNTER — OFFICE VISIT (OUTPATIENT)
Dept: HEMATOLOGY | Age: 54
End: 2024-02-12
Payer: COMMERCIAL

## 2024-02-12 VITALS
BODY MASS INDEX: 36.39 KG/M2 | OXYGEN SATURATION: 100 % | WEIGHT: 268.7 LBS | HEIGHT: 72 IN | SYSTOLIC BLOOD PRESSURE: 150 MMHG | TEMPERATURE: 97.8 F | RESPIRATION RATE: 18 BRPM | HEART RATE: 74 BPM | DIASTOLIC BLOOD PRESSURE: 80 MMHG

## 2024-02-12 DIAGNOSIS — R21 SKIN RASH: ICD-10-CM

## 2024-02-12 DIAGNOSIS — R60.0 BILATERAL LEG EDEMA: ICD-10-CM

## 2024-02-12 DIAGNOSIS — I25.10 CORONARY ARTERY CALCIFICATION: ICD-10-CM

## 2024-02-12 DIAGNOSIS — K80.00 CHOLELITHIASIS AND ACUTE CHOLECYSTITIS WITHOUT OBSTRUCTION: ICD-10-CM

## 2024-02-12 DIAGNOSIS — C64.9 RENAL CELL CARCINOMA OF KIDNEY EXCLUDING RENAL PELVIS (HCC): Primary | ICD-10-CM

## 2024-02-12 DIAGNOSIS — R53.83 FATIGUE DUE TO TREATMENT: ICD-10-CM

## 2024-02-12 DIAGNOSIS — Z13.29 SCREENING FOR THYROID DISORDER: ICD-10-CM

## 2024-02-12 DIAGNOSIS — I25.84 CORONARY ARTERY CALCIFICATION: ICD-10-CM

## 2024-02-12 DIAGNOSIS — Z71.89 CARE PLAN DISCUSSED WITH PATIENT: ICD-10-CM

## 2024-02-12 DIAGNOSIS — Z79.01 ANTICOAGULATION MANAGEMENT ENCOUNTER: ICD-10-CM

## 2024-02-12 DIAGNOSIS — N50.811 TESTICULAR PAIN, RIGHT: ICD-10-CM

## 2024-02-12 DIAGNOSIS — Z51.12 ENCOUNTER FOR ANTINEOPLASTIC IMMUNOTHERAPY: ICD-10-CM

## 2024-02-12 DIAGNOSIS — M79.605 LEG PAIN, LEFT: ICD-10-CM

## 2024-02-12 DIAGNOSIS — M79.89 LEG SWELLING: ICD-10-CM

## 2024-02-12 DIAGNOSIS — C64.9 RENAL CELL CARCINOMA OF KIDNEY EXCLUDING RENAL PELVIS (HCC): ICD-10-CM

## 2024-02-12 DIAGNOSIS — R73.9 HYPERGLYCEMIA: ICD-10-CM

## 2024-02-12 DIAGNOSIS — Z51.81 ANTICOAGULATION MANAGEMENT ENCOUNTER: ICD-10-CM

## 2024-02-12 DIAGNOSIS — N28.9 RENAL IMPAIRMENT: ICD-10-CM

## 2024-02-12 DIAGNOSIS — M79.604 LEG PAIN, RIGHT: ICD-10-CM

## 2024-02-12 LAB
ALBUMIN SERPL-MCNC: 4.7 G/DL (ref 3.5–5.2)
ALP SERPL-CCNC: 60 U/L (ref 40–130)
ALT SERPL-CCNC: 21 U/L (ref 21–72)
ANION GAP SERPL CALCULATED.3IONS-SCNC: 8 MMOL/L (ref 7–19)
AST SERPL-CCNC: 47 U/L (ref 17–59)
BASOPHILS # BLD: 0.06 K/UL (ref 0.01–0.08)
BASOPHILS NFR BLD: 0.9 % (ref 0.1–1.2)
BILIRUB SERPL-MCNC: 1.7 MG/DL (ref 0.2–1.3)
BUN SERPL-MCNC: 27 MG/DL (ref 9–20)
CALCIUM SERPL-MCNC: 8.8 MG/DL (ref 8.4–10.2)
CHLORIDE SERPL-SCNC: 102 MMOL/L (ref 98–111)
CO2 SERPL-SCNC: 29 MMOL/L (ref 22–29)
CORTIS PM SERPL-MCNC: 7.3 UG/DL (ref 2.3–11.9)
CREAT SERPL-MCNC: 1.2 MG/DL (ref 0.6–1.2)
EOSINOPHIL # BLD: 0.4 K/UL (ref 0.04–0.54)
EOSINOPHIL NFR BLD: 6.1 % (ref 0.7–7)
ERYTHROCYTE [DISTWIDTH] IN BLOOD BY AUTOMATED COUNT: 14.2 % (ref 11.6–14.4)
GLOBULIN: 3.4 G/DL
GLUCOSE SERPL-MCNC: 228 MG/DL (ref 74–106)
HCT VFR BLD AUTO: 40.4 % (ref 40.1–51)
HGB BLD-MCNC: 13.3 G/DL (ref 13.7–17.5)
LYMPHOCYTES # BLD: 1.46 K/UL (ref 1.18–3.74)
LYMPHOCYTES NFR BLD: 22.3 % (ref 19.3–53.1)
MCH RBC QN AUTO: 28.9 PG (ref 25.7–32.2)
MCHC RBC AUTO-ENTMCNC: 32.9 G/DL (ref 32.3–36.5)
MCV RBC AUTO: 87.8 FL (ref 79–92.2)
MONOCYTES # BLD: 0.5 K/UL (ref 0.24–0.82)
MONOCYTES NFR BLD: 7.6 % (ref 4.7–12.5)
NEUTROPHILS # BLD: 4.11 K/UL (ref 1.56–6.13)
NEUTS SEG NFR BLD: 62.8 % (ref 34–71.1)
PLATELET # BLD AUTO: 162 K/UL (ref 163–337)
PMV BLD AUTO: 11.9 FL (ref 7.4–10.4)
POTASSIUM SERPL-SCNC: 5.1 MMOL/L (ref 3.5–5.1)
PROT SERPL-MCNC: 8.2 G/DL (ref 6.3–8.2)
RBC # BLD AUTO: 4.6 M/UL (ref 4.63–6.08)
SODIUM SERPL-SCNC: 139 MMOL/L (ref 137–145)
T4 FREE SERPL-MCNC: 1.01 NG/DL (ref 0.93–1.7)
TSH SERPL DL<=0.005 MIU/L-ACNC: 4.57 UIU/ML (ref 0.27–4.2)
WBC # BLD AUTO: 6.55 K/UL (ref 4.23–9.07)

## 2024-02-12 PROCEDURE — 80053 COMPREHEN METABOLIC PANEL: CPT

## 2024-02-12 PROCEDURE — 6360000002 HC RX W HCPCS: Performed by: INTERNAL MEDICINE

## 2024-02-12 PROCEDURE — 2580000003 HC RX 258: Performed by: INTERNAL MEDICINE

## 2024-02-12 PROCEDURE — 96413 CHEMO IV INFUSION 1 HR: CPT

## 2024-02-12 PROCEDURE — 93970 EXTREMITY STUDY: CPT

## 2024-02-12 PROCEDURE — 99215 OFFICE O/P EST HI 40 MIN: CPT | Performed by: INTERNAL MEDICINE

## 2024-02-12 PROCEDURE — 3079F DIAST BP 80-89 MM HG: CPT | Performed by: INTERNAL MEDICINE

## 2024-02-12 PROCEDURE — 36415 COLL VENOUS BLD VENIPUNCTURE: CPT

## 2024-02-12 PROCEDURE — 85025 COMPLETE CBC W/AUTO DIFF WBC: CPT

## 2024-02-12 PROCEDURE — 3077F SYST BP >= 140 MM HG: CPT | Performed by: INTERNAL MEDICINE

## 2024-02-12 RX ORDER — SODIUM CHLORIDE 9 MG/ML
5-250 INJECTION, SOLUTION INTRAVENOUS PRN
Status: CANCELLED | OUTPATIENT
Start: 2024-02-12

## 2024-02-12 RX ORDER — SODIUM CHLORIDE 9 MG/ML
INJECTION, SOLUTION INTRAVENOUS CONTINUOUS
Status: CANCELLED | OUTPATIENT
Start: 2024-02-12

## 2024-02-12 RX ORDER — MEPERIDINE HYDROCHLORIDE 50 MG/ML
12.5 INJECTION INTRAMUSCULAR; INTRAVENOUS; SUBCUTANEOUS PRN
Status: CANCELLED | OUTPATIENT
Start: 2024-02-12

## 2024-02-12 RX ORDER — EPINEPHRINE 1 MG/ML
0.3 INJECTION, SOLUTION, CONCENTRATE INTRAVENOUS PRN
Status: CANCELLED | OUTPATIENT
Start: 2024-02-12

## 2024-02-12 RX ORDER — ONDANSETRON 2 MG/ML
8 INJECTION INTRAMUSCULAR; INTRAVENOUS
Status: CANCELLED | OUTPATIENT
Start: 2024-02-12

## 2024-02-12 RX ORDER — HEPARIN SODIUM (PORCINE) LOCK FLUSH IV SOLN 100 UNIT/ML 100 UNIT/ML
500 SOLUTION INTRAVENOUS PRN
Status: CANCELLED | OUTPATIENT
Start: 2024-02-12

## 2024-02-12 RX ORDER — FAMOTIDINE 10 MG/ML
20 INJECTION, SOLUTION INTRAVENOUS
Status: CANCELLED | OUTPATIENT
Start: 2024-02-12

## 2024-02-12 RX ORDER — DIPHENHYDRAMINE HYDROCHLORIDE 50 MG/ML
50 INJECTION INTRAMUSCULAR; INTRAVENOUS
Status: CANCELLED | OUTPATIENT
Start: 2024-02-12

## 2024-02-12 RX ORDER — ALBUTEROL SULFATE 90 UG/1
4 AEROSOL, METERED RESPIRATORY (INHALATION) PRN
Status: CANCELLED | OUTPATIENT
Start: 2024-02-12

## 2024-02-12 RX ORDER — SODIUM CHLORIDE 0.9 % (FLUSH) 0.9 %
5-40 SYRINGE (ML) INJECTION PRN
Status: CANCELLED | OUTPATIENT
Start: 2024-02-12

## 2024-02-12 RX ORDER — ACETAMINOPHEN 325 MG/1
650 TABLET ORAL
Status: CANCELLED | OUTPATIENT
Start: 2024-02-12

## 2024-02-12 RX ADMIN — SODIUM CHLORIDE 400 MG: 9 INJECTION, SOLUTION INTRAVENOUS at 14:56

## 2024-02-12 NOTE — PROGRESS NOTES
Fracisco Reeves (:  1970) is a 53 y.o. male,Established patient, here for evaluation of the following chief complaint(s):  Follow-up            SUBJECTIVE/OBJECTIVE:  He presents for follow-up of known DVT.he had surgical ligation of IVC.   He has no known history of DVT. His current treatment includes  eliquis . He does not have a family history of hypercoagulability. Risk factors for hypercoagulable state include: cancer. He does not have an IVC filter. He has symptoms involving   left leg and right leg.  Symptoms include swelling   Differential diagnosis includes but is not limited to CHF, thyroid disease, venous disease, DVT, SVT, peripheral vascular disease.      I have personally reviewed the following: problem list, current meds, allergies, PMH, PSH, family hx, and social hx  Fracisco Reeves is a 53 y.o. male with the following history as recorded in [facility]:  Patient Active Problem List    Diagnosis Date Noted    Elevated serum creatinine 10/25/2023    Dehydration 10/25/2023    Renal cell carcinoma of kidney excluding renal pelvis (HCC) 2023    Deep vein thrombosis (DVT) of both lower extremities ([facility]) 2023    DVT, lower extremity, distal, acute, bilateral ([facility]) 08/15/2023    JO (acute kidney injury) ([facility]) 08/15/2023    Type 2 diabetes mellitus without complication, with long-term current use of insulin ([facility]) 2022    Primary hypertension 2022    Hyperlipidemia 2022    Chronic back pain 2022    LI on CPAP 2022    Recurrent major depressive disorder, in partial remission ([facility]) 2022     Current Outpatient Medications   Medication Sig Dispense Refill    methocarbamol (ROBAXIN) 500 MG tablet       blood glucose monitor strips Test 3 times a day & as needed for symptoms of irregular blood glucose. Dispense sufficient amount for indicated testing frequency plus additional to accommodate PRN testing needs. 100 strip 2    atorvastatin (LIPITOR) 80 MG tablet Take 1

## 2024-02-12 NOTE — PROGRESS NOTES
MEDICAL ONCOLOGY PROGRESS NOTE                                                          Fracisco Reeves   1970 2/12/2024     Chief Complaint   Patient presents with    Cancer      INTERVAL HISTORY/HISTORY OF PRESENT ILLNESS:  Reason for MD visit-toxicity assessment disease management  The patient is currently receiving adjuvant immunotherapy with pembrolizumab for his stage III renal cell carcinoma, clear-cell subtype.  Tolerated treatment relatively well.  In addition, history of provoked bilateral lower extremity DVT.  He is currently on treatment with Eliquis.  He denies any bleeding.  He is compliant with Eliquis.  Denies any systemic symptoms.  He had a CT chest abdomen pelvis as part of image surveillance.  Patient complaining of right testicular pain.    Diagnosis  Renal cell carcinoma, clear-cell, Aug 2023  [id number], stage III  Provoked bilateral lower extremity DVT, Aug 2023     Treatment summary  8/15/23 UFH heparin-> Eliquis  8/22/2023 Right nephrectomy with IVC thromb[facility]by Dr Lopez Wilkerson at [facility]  10/2/23 Initiated adjuvant Pembrolizumab 200mg every 3 weeks x17 cycles/1 year-6 cycles 200mg received  2/12/24 Change Pembrolizumab to 400mg every 6 weeks x6 additional cycles to complete 1 year     Cancer history  Fracisco Reeves was first seen by me on 8/15/2023 during patient was positioned [facility].  The patient has a history of type 2 diabetes, hypertension hyperlipidemia, chronic back pain, LI with CPAP, history of depression.  He presented to the ER department at [facility] complaints of bilateral lower extremity swelling for about 6 weeks.  This is getting progressively worse.  In addition, complains of right flank pain.  Patient denies any hematuria.  Patient reports increased fatigue.  Laboratory work-up in the emergency remarkable for neutrophil leukocytosis, elevated creatinine/decreased GFR, mild elevation of bilirubin.  Imaging studies as below  8/15/2023-CT

## 2024-02-15 ENCOUNTER — HOSPITAL ENCOUNTER (OUTPATIENT)
Dept: GENERAL RADIOLOGY | Age: 54
Discharge: HOME | End: 2024-02-15
Payer: COMMERCIAL

## 2024-02-15 DIAGNOSIS — N50.811 TESTICULAR PAIN, RIGHT: ICD-10-CM

## 2024-02-15 PROCEDURE — 76870 US EXAM SCROTUM: CPT

## 2024-02-19 ENCOUNTER — OFFICE VISIT (OUTPATIENT)
Dept: VASCULAR SURGERY | Age: 54
End: 2024-02-19
Payer: COMMERCIAL

## 2024-02-19 VITALS
HEART RATE: 92 BPM | SYSTOLIC BLOOD PRESSURE: 142 MMHG | TEMPERATURE: 97.1 F | OXYGEN SATURATION: 98 % | DIASTOLIC BLOOD PRESSURE: 87 MMHG

## 2024-02-19 DIAGNOSIS — M79.605 LEG PAIN, LEFT: Primary | ICD-10-CM

## 2024-02-19 DIAGNOSIS — M79.89 LEG SWELLING: ICD-10-CM

## 2024-02-19 DIAGNOSIS — M79.604 LEG PAIN, RIGHT: ICD-10-CM

## 2024-02-19 PROCEDURE — 99214 OFFICE O/P EST MOD 30 MIN: CPT | Performed by: NURSE PRACTITIONER

## 2024-02-19 PROCEDURE — 3079F DIAST BP 80-89 MM HG: CPT | Performed by: NURSE PRACTITIONER

## 2024-02-19 PROCEDURE — 3077F SYST BP >= 140 MM HG: CPT | Performed by: NURSE PRACTITIONER

## 2024-02-19 RX ORDER — METHOCARBAMOL 500 MG/1
TABLET, FILM COATED ORAL
COMMUNITY
Start: 2024-01-18

## 2024-02-20 ENCOUNTER — TELEPHONE (OUTPATIENT)
Dept: HEMATOLOGY | Age: 54
End: 2024-02-20

## 2024-02-20 DIAGNOSIS — E78.5 HYPERLIPIDEMIA, UNSPECIFIED HYPERLIPIDEMIA TYPE: ICD-10-CM

## 2024-02-20 DIAGNOSIS — C64.9 RENAL CELL CARCINOMA OF KIDNEY EXCLUDING RENAL PELVIS (HCC): ICD-10-CM

## 2024-02-20 DIAGNOSIS — E11.9 TYPE 2 DIABETES MELLITUS WITHOUT COMPLICATION, WITH LONG-TERM CURRENT USE OF INSULIN (HCC): ICD-10-CM

## 2024-02-20 DIAGNOSIS — Z79.4 TYPE 2 DIABETES MELLITUS WITHOUT COMPLICATION, WITH LONG-TERM CURRENT USE OF INSULIN (HCC): ICD-10-CM

## 2024-02-20 DIAGNOSIS — N50.811 TESTICULAR PAIN, RIGHT: Primary | ICD-10-CM

## 2024-02-20 RX ORDER — ATORVASTATIN CALCIUM 80 MG/1
TABLET, FILM COATED ORAL
Qty: 30 TABLET | Refills: 0 | Status: SHIPPED | OUTPATIENT
Start: 2024-02-20

## 2024-02-20 NOTE — TELEPHONE ENCOUNTER
Requested Prescriptions     Pending Prescriptions Disp Refills    pregabalin (LYRICA) 100 MG capsule [Pharmacy Med Name: Pregabalin 100 MG Oral Capsule] 60 capsule 0     Sig: Take 1 capsule by mouth 2 times daily.    ELIQUIS 5 MG TABS tablet [Pharmacy Med Name: Eliquis 5 MG Oral Tablet] 60 tablet 0     Sig: Take 1 tablet by mouth twice daily       Last Office Visit:  Visit date not found  Next Office Visit:  Visit date not found  Last Medication Refill:  1/19/2024 with 0 NIKI Ennis up to date:  1/2/2024    *RX updated to reflect   2/20/2024  fill date*

## 2024-02-20 NOTE — TELEPHONE ENCOUNTER
Attempted to call patient related to US results. Left message for patient to call me back at this time. Electronically signed by Ibis Shelton RN on 2/20/2024 at 12:07 PM

## 2024-02-21 RX ORDER — PREGABALIN 100 MG/1
100 CAPSULE ORAL 2 TIMES DAILY
Qty: 60 CAPSULE | Refills: 0 | Status: SHIPPED | OUTPATIENT
Start: 2024-02-21 | End: 2024-04-21

## 2024-02-21 RX ORDER — APIXABAN 5 MG/1
5 TABLET, FILM COATED ORAL 2 TIMES DAILY
Qty: 60 TABLET | Refills: 0 | Status: SHIPPED | OUTPATIENT
Start: 2024-02-21

## 2024-02-27 ENCOUNTER — TELEPHONE (OUTPATIENT)
Dept: FAMILY MEDICINE CLINIC | Age: 54
End: 2024-02-27

## 2024-02-27 DIAGNOSIS — Z79.4 TYPE 2 DIABETES MELLITUS WITHOUT COMPLICATION, WITH LONG-TERM CURRENT USE OF INSULIN (HCC): ICD-10-CM

## 2024-02-27 DIAGNOSIS — E11.9 TYPE 2 DIABETES MELLITUS WITHOUT COMPLICATION, WITH LONG-TERM CURRENT USE OF INSULIN (HCC): ICD-10-CM

## 2024-02-27 RX ORDER — PROCHLORPERAZINE 25 MG/1
1 SUPPOSITORY RECTAL
Qty: 3 EACH | Refills: 2 | Status: CANCELLED | OUTPATIENT
Start: 2024-02-27

## 2024-02-27 NOTE — TELEPHONE ENCOUNTER
----- Message from Mona Bowman sent at 2024  1:02 PM CST -----  Subject: Message to Provider    QUESTIONS  Information for Provider? Alex from [facility] calling and stated on   Continuous Blood Gluc Sensor (DEXCOM G6 SENSOR) MISC has  and want   to see if it can be redone so pt can recieve it ,  ---------------------------------------------------------------------------  --------------  CALL BACK INFO  [phone]; Do not leave any message, patient will call back for answer  ---------------------------------------------------------------------------  --------------  SCRIPT ANSWERS  Relationship to Patient? Covered Entity  Covered Entity Type? Pharmacy?  Representative Name? alex

## 2024-02-29 DIAGNOSIS — Z79.4 TYPE 2 DIABETES MELLITUS WITHOUT COMPLICATION, WITH LONG-TERM CURRENT USE OF INSULIN (HCC): ICD-10-CM

## 2024-02-29 DIAGNOSIS — E11.9 TYPE 2 DIABETES MELLITUS WITHOUT COMPLICATION, WITH LONG-TERM CURRENT USE OF INSULIN (HCC): ICD-10-CM

## 2024-02-29 RX ORDER — PROCHLORPERAZINE 25 MG/1
1 SUPPOSITORY RECTAL CONTINUOUS
Qty: 3 EACH | Refills: 3 | Status: SHIPPED | OUTPATIENT
Start: 2024-02-29

## 2024-02-29 RX ORDER — PROCHLORPERAZINE 25 MG/1
1 SUPPOSITORY RECTAL
Qty: 3 EACH | Refills: 2 | Status: SHIPPED | OUTPATIENT
Start: 2024-02-29

## 2024-03-01 ENCOUNTER — OFFICE VISIT (OUTPATIENT)
Dept: CARDIOLOGY CLINIC | Age: 54
End: 2024-03-01
Payer: COMMERCIAL

## 2024-03-01 VITALS
WEIGHT: 272 LBS | BODY MASS INDEX: 36.84 KG/M2 | HEIGHT: 72 IN | DIASTOLIC BLOOD PRESSURE: 88 MMHG | SYSTOLIC BLOOD PRESSURE: 138 MMHG

## 2024-03-01 DIAGNOSIS — I25.10 CORONARY ARTERY CALCIFICATION: Primary | ICD-10-CM

## 2024-03-01 DIAGNOSIS — I82.4Z3 DVT, LOWER EXTREMITY, DISTAL, ACUTE, BILATERAL (HCC): ICD-10-CM

## 2024-03-01 DIAGNOSIS — E11.9 TYPE 2 DIABETES MELLITUS WITHOUT COMPLICATION, WITH LONG-TERM CURRENT USE OF INSULIN (HCC): ICD-10-CM

## 2024-03-01 DIAGNOSIS — E78.2 MIXED HYPERLIPIDEMIA: ICD-10-CM

## 2024-03-01 DIAGNOSIS — Z79.4 TYPE 2 DIABETES MELLITUS WITHOUT COMPLICATION, WITH LONG-TERM CURRENT USE OF INSULIN (HCC): ICD-10-CM

## 2024-03-01 DIAGNOSIS — I10 PRIMARY HYPERTENSION: ICD-10-CM

## 2024-03-01 DIAGNOSIS — I25.84 CORONARY ARTERY CALCIFICATION: Primary | ICD-10-CM

## 2024-03-01 PROCEDURE — 99203 OFFICE O/P NEW LOW 30 MIN: CPT | Performed by: CLINICAL NURSE SPECIALIST

## 2024-03-01 PROCEDURE — 3079F DIAST BP 80-89 MM HG: CPT | Performed by: CLINICAL NURSE SPECIALIST

## 2024-03-01 PROCEDURE — 93000 ELECTROCARDIOGRAM COMPLETE: CPT | Performed by: CLINICAL NURSE SPECIALIST

## 2024-03-01 PROCEDURE — 3075F SYST BP GE 130 - 139MM HG: CPT | Performed by: CLINICAL NURSE SPECIALIST

## 2024-03-01 NOTE — PATIENT INSTRUCTIONS
Dobutamine stress echo and 2 d echo soon     Continue anticoagulation with Eliquis  Maintain good blood pressure control-goal<130/80 at rest  Maintain good cholesterol control LDL goal<70 with arterial disease  If you are diabetic work to keep/obtain hemoglobin A1c< 7    Follow up yearly if testing is OK   Call with any questions or concerns  Follow up with PCP for non cardiac problems  Report any new problems  Cardiovascular Fitness-Exercise as tolerated.  Strive for 30 minutes of exercise most days of the week.    Cardiac / Healthy Diet  Continue current medications as directed  Continue plan of treatment

## 2024-03-01 NOTE — PROGRESS NOTES
[facility], [facility]  [address], [place] [address]  Phone: [phone]  Fax: [phone]    OFFICE VISIT:  3/1/2024    Fracisco Reeves - : 1970    Dear Ofelia Johns and Melanie,     I appreciate the opportunity of participating in the care of Fracisco Reeves.  He is a very pleasant 53 y.o. male who I had the opportunity of seeing in my office today, 3/1/24.  Records from your office have been obtained and reviewed.    Reason For Visit:  Fracisco is a 53 y.o. male who is here for New Patient (Pt has kidney cancer amd had a test that showed plaque in his heart )  Patient has history of diabetes, hypertension, hyperlipidemia and stage III renal cell carcinoma with surgery and chemotherapy.  Patient had IVC thrombectomy at time of his nephrectomy   He is now on maintenance immunotherapy  With Keytruda   DVTs developed last year and following with vascular surgery for this.  Patient was found to have coronary calcification on CT of his chest.  Here today at referral from oncologist    He is here today for evaluation.  He states has been a diabetic since he was in his 30s.  I have a CGM.  Overall he is slowly progressing well from his extensive surgery last August and chemotherapy.  He is now on maintenance therapy with Keytruda.    Has some mild SHANKAR.  Denies any chest pain.  Significant peripheral edema due to chronic DVTs.  Wearing compression socks    He did lose weight with his surgery and treatment.  States he has not been wearing his CPAP regularly.  He needs some new masks    Subjective  Fracisco has no exertional chest pain, pressure, burning or squeezing.  He is able to lie flat without evidence of orthopnea or paroxysmal nocturnal dyspnea.  No symptomatic tachy- or kandy-arrhythmia.  No numbness or weakness to suggest cerebrovascular accident or transient ischemic attack.      Fracisco Reeves has the following history as recorded in [employer]:  Patient Active Problem List    Diagnosis

## 2024-03-04 RX ORDER — PROCHLORPERAZINE 25 MG/1
1 SUPPOSITORY RECTAL
Qty: 3 EACH | Refills: 2 | Status: SHIPPED | OUTPATIENT
Start: 2024-03-04

## 2024-03-04 RX ORDER — PROCHLORPERAZINE 25 MG/1
1 SUPPOSITORY RECTAL CONTINUOUS
Qty: 3 EACH | Refills: 3 | Status: SHIPPED | OUTPATIENT
Start: 2024-03-04

## 2024-03-11 RX ORDER — APIXABAN 5 MG/1
5 TABLET, FILM COATED ORAL 2 TIMES DAILY
Qty: 60 TABLET | Refills: 5 | Status: SHIPPED | OUTPATIENT
Start: 2024-03-11

## 2024-03-11 NOTE — TELEPHONE ENCOUNTER
Requested Prescriptions     Pending Prescriptions Disp Refills    ELIQUIS 5 MG TABS tablet [Pharmacy Med Name: Eliquis 5 MG Oral Tablet] 60 tablet 0     Sig: Take 1 tablet by mouth twice daily       Last Office Visit: Visit date not found  Next Office Visit: Visit date not found  Last Medication Refill: 2/21/2024 with 0 RF

## 2024-03-15 DIAGNOSIS — E78.5 HYPERLIPIDEMIA, UNSPECIFIED HYPERLIPIDEMIA TYPE: ICD-10-CM

## 2024-03-18 RX ORDER — ATORVASTATIN CALCIUM 80 MG/1
TABLET, FILM COATED ORAL
Qty: 30 TABLET | Refills: 0 | Status: SHIPPED | OUTPATIENT
Start: 2024-03-18

## 2024-03-21 NOTE — PROGRESS NOTES
impairment      Latest Ref Rng & Units 3/25/2024     3:09 PM 2/12/2024     2:40 PM 1/19/2024    11:53 AM 12/29/2023     2:44 PM 12/8/2023     2:25 PM   Labs Renal   BUN 9 - 20 mg/dL 19  27  19  17  23    Cr 0.6 - 1.2 mg/dL 1.2  1.2  1.3  1.2  1.5    K 3.5 - 5.1 mmol/L 4.1  5.1  4.0  4.4  4.1    Na 137 - 145 mmol/L 138  139  141  142  140      Coronary calcifications-patient has three-vessel coronary calcifications.  Referral to cardiology for further evaluation    Cholelithiasis without acute cholecystitis-patient is asymptomatic.    Right testicular pain  2/15/24 US bilateral testicle: Moderate left varicocele. 6 mm right epididymal cyst. No acute findings.    PLAN:  RTC with MD in treatment room 6 weeks  CBC, CMP, TSH, T4 Free, Cortisol PM today and with each treatment  C# 8/12 Pembrolizumab to 400mg every 6 weeks x6 additional cycles to complete 1 year  Continue Eliquis 5mg twice a day for lifetime  Continue to follow up with [facility], 3/4/25  Proceed with 2D echo and stress echo per cardiology, 4/11/24  Repeat CT scans chest/abdomen/pelvis WO contrast June 2024 for continued surveillance    Follow Up:   Return in about 6 weeks (around 5/6/2024) for Treatment & see  in [place] RM Keytruda.   TSH, T4, Cortisol, Keytruda 6 weeks     Ibis FAUSTIN am pre-charting as a registered nurse for Kenia Navarro MD. Electronically signed by Ibis Shelton RN on 3/25/2024 at 2:03 PM CDT.    I have seen, examined and reviewed this patient medication list, appropriate labs and imaging studies. I reviewed relevant medical records and others physician’s notes. I discussed the plans of care with the patient. I answered all the questions to the patient’s satisfaction. I have also reviewed the chief complaint (CC) and part of the history (History of Present Illness (HPI), Past Family Social History (PFSH), or Review of Systems (ROS) and made changes when appropriated.       (Please note that portions of this note

## 2024-03-22 ENCOUNTER — HOSPITAL ENCOUNTER (OUTPATIENT)
Dept: INFUSION THERAPY | Age: 54
Discharge: HOME | End: 2024-03-22

## 2024-03-22 DIAGNOSIS — Z79.4 TYPE 2 DIABETES MELLITUS WITHOUT COMPLICATION, WITH LONG-TERM CURRENT USE OF INSULIN (HCC): ICD-10-CM

## 2024-03-22 DIAGNOSIS — C64.9 RENAL CELL CARCINOMA OF KIDNEY EXCLUDING RENAL PELVIS (HCC): ICD-10-CM

## 2024-03-22 DIAGNOSIS — E11.9 TYPE 2 DIABETES MELLITUS WITHOUT COMPLICATION, WITH LONG-TERM CURRENT USE OF INSULIN (HCC): ICD-10-CM

## 2024-03-22 NOTE — TELEPHONE ENCOUNTER
Fracisco Reeves called to request a refill on his medication.      Last office visit : 11/1/2023   Next office visit : Visit date not found     Requested Prescriptions     Pending Prescriptions Disp Refills    insulin 70-30 (HUMULIN 70/30) (70-30) 100 UNIT per ML injection vial [Pharmacy Med Name: HumuLIN 70/30 (70-30) 100 UNIT/ML Subcutaneous Suspension] 60 mL 0     Sig: INJECT 100 UNITS SUBCUTANEOUSLY TWICE DAILY            Rose Nixon MA

## 2024-03-25 ENCOUNTER — HOSPITAL ENCOUNTER (OUTPATIENT)
Dept: INFUSION THERAPY | Age: 54
Discharge: HOME | End: 2024-03-25
Payer: COMMERCIAL

## 2024-03-25 ENCOUNTER — OFFICE VISIT (OUTPATIENT)
Dept: HEMATOLOGY | Age: 54
End: 2024-03-25
Payer: COMMERCIAL

## 2024-03-25 VITALS
HEART RATE: 77 BPM | HEIGHT: 72 IN | RESPIRATION RATE: 18 BRPM | TEMPERATURE: 98 F | DIASTOLIC BLOOD PRESSURE: 77 MMHG | BODY MASS INDEX: 37.65 KG/M2 | WEIGHT: 278 LBS | SYSTOLIC BLOOD PRESSURE: 131 MMHG | OXYGEN SATURATION: 93 %

## 2024-03-25 DIAGNOSIS — Z71.89 CARE PLAN DISCUSSED WITH PATIENT: ICD-10-CM

## 2024-03-25 DIAGNOSIS — Z51.12 ENCOUNTER FOR ANTINEOPLASTIC IMMUNOTHERAPY: ICD-10-CM

## 2024-03-25 DIAGNOSIS — D68.51 FACTOR 5 LEIDEN MUTATION, HETEROZYGOUS (HCC): ICD-10-CM

## 2024-03-25 DIAGNOSIS — R60.0 BILATERAL LEG EDEMA: ICD-10-CM

## 2024-03-25 DIAGNOSIS — R53.0 NEOPLASTIC MALIGNANT RELATED FATIGUE: ICD-10-CM

## 2024-03-25 DIAGNOSIS — Z71.89 COORDINATION OF COMPLEX CARE: ICD-10-CM

## 2024-03-25 DIAGNOSIS — C64.9 RENAL CELL CARCINOMA OF KIDNEY EXCLUDING RENAL PELVIS (HCC): Primary | ICD-10-CM

## 2024-03-25 DIAGNOSIS — Z79.01 CHRONIC ANTICOAGULATION: ICD-10-CM

## 2024-03-25 DIAGNOSIS — I82.503 CHRONIC DEEP VEIN THROMBOSIS (DVT) OF BOTH LOWER EXTREMITIES, UNSPECIFIED VEIN (HCC): ICD-10-CM

## 2024-03-25 DIAGNOSIS — C64.9 RENAL CELL CARCINOMA OF KIDNEY EXCLUDING RENAL PELVIS (HCC): ICD-10-CM

## 2024-03-25 LAB
ALBUMIN SERPL-MCNC: 3.8 G/DL (ref 3.5–5.2)
ALP SERPL-CCNC: 61 U/L (ref 40–130)
ALT SERPL-CCNC: 17 U/L (ref 21–72)
ANION GAP SERPL CALCULATED.3IONS-SCNC: 8 MMOL/L (ref 7–19)
AST SERPL-CCNC: 22 U/L (ref 17–59)
BASOPHILS # BLD: 0.08 K/UL (ref 0.01–0.08)
BASOPHILS NFR BLD: 1.1 % (ref 0.1–1.2)
BILIRUB SERPL-MCNC: 1 MG/DL (ref 0.2–1.3)
BUN SERPL-MCNC: 19 MG/DL (ref 9–20)
CALCIUM SERPL-MCNC: 8.5 MG/DL (ref 8.4–10.2)
CHLORIDE SERPL-SCNC: 102 MMOL/L (ref 98–111)
CO2 SERPL-SCNC: 28 MMOL/L (ref 22–29)
CORTIS PM SERPL-MCNC: 2.6 UG/DL (ref 2.3–11.9)
CREAT SERPL-MCNC: 1.2 MG/DL (ref 0.6–1.2)
EOSINOPHIL # BLD: 0.59 K/UL (ref 0.04–0.54)
EOSINOPHIL NFR BLD: 8.4 % (ref 0.7–7)
ERYTHROCYTE [DISTWIDTH] IN BLOOD BY AUTOMATED COUNT: 13.8 % (ref 11.6–14.4)
GLOBULIN: 2.9 G/DL
GLUCOSE SERPL-MCNC: 200 MG/DL (ref 74–106)
HCT VFR BLD AUTO: 35.8 % (ref 40.1–51)
HGB BLD-MCNC: 11.8 G/DL (ref 13.7–17.5)
LYMPHOCYTES # BLD: 1.73 K/UL (ref 1.18–3.74)
LYMPHOCYTES NFR BLD: 24.7 % (ref 19.3–53.1)
MCH RBC QN AUTO: 29.3 PG (ref 25.7–32.2)
MCHC RBC AUTO-ENTMCNC: 33 G/DL (ref 32.3–36.5)
MCV RBC AUTO: 88.8 FL (ref 79–92.2)
MONOCYTES # BLD: 0.56 K/UL (ref 0.24–0.82)
MONOCYTES NFR BLD: 8 % (ref 4.7–12.5)
NEUTROPHILS # BLD: 4.01 K/UL (ref 1.56–6.13)
NEUTS SEG NFR BLD: 57.5 % (ref 34–71.1)
PLATELET # BLD AUTO: 154 K/UL (ref 163–337)
PMV BLD AUTO: 11.1 FL (ref 7.4–10.4)
POTASSIUM SERPL-SCNC: 4.1 MMOL/L (ref 3.5–5.1)
PROT SERPL-MCNC: 6.7 G/DL (ref 6.3–8.2)
RBC # BLD AUTO: 4.03 M/UL (ref 4.63–6.08)
SODIUM SERPL-SCNC: 138 MMOL/L (ref 137–145)
T4 FREE SERPL-MCNC: 0.7 NG/DL (ref 0.93–1.7)
TSH SERPL DL<=0.005 MIU/L-ACNC: 5.66 UIU/ML (ref 0.27–4.2)
WBC # BLD AUTO: 6.99 K/UL (ref 4.23–9.07)

## 2024-03-25 PROCEDURE — 80053 COMPREHEN METABOLIC PANEL: CPT

## 2024-03-25 PROCEDURE — 3078F DIAST BP <80 MM HG: CPT | Performed by: INTERNAL MEDICINE

## 2024-03-25 PROCEDURE — 99214 OFFICE O/P EST MOD 30 MIN: CPT | Performed by: INTERNAL MEDICINE

## 2024-03-25 PROCEDURE — 3075F SYST BP GE 130 - 139MM HG: CPT | Performed by: INTERNAL MEDICINE

## 2024-03-25 PROCEDURE — 6360000002 HC RX W HCPCS: Performed by: INTERNAL MEDICINE

## 2024-03-25 PROCEDURE — 85025 COMPLETE CBC W/AUTO DIFF WBC: CPT

## 2024-03-25 PROCEDURE — 96413 CHEMO IV INFUSION 1 HR: CPT

## 2024-03-25 PROCEDURE — 36415 COLL VENOUS BLD VENIPUNCTURE: CPT

## 2024-03-25 PROCEDURE — 2580000003 HC RX 258: Performed by: INTERNAL MEDICINE

## 2024-03-25 PROCEDURE — G2211 COMPLEX E/M VISIT ADD ON: HCPCS | Performed by: INTERNAL MEDICINE

## 2024-03-25 RX ORDER — ACETAMINOPHEN 325 MG/1
650 TABLET ORAL
OUTPATIENT
Start: 2024-03-25

## 2024-03-25 RX ORDER — SODIUM CHLORIDE 9 MG/ML
5-250 INJECTION, SOLUTION INTRAVENOUS PRN
OUTPATIENT
Start: 2024-03-25

## 2024-03-25 RX ORDER — EPINEPHRINE 1 MG/ML
0.3 INJECTION, SOLUTION, CONCENTRATE INTRAVENOUS PRN
OUTPATIENT
Start: 2024-03-25

## 2024-03-25 RX ORDER — FAMOTIDINE 10 MG/ML
20 INJECTION, SOLUTION INTRAVENOUS
OUTPATIENT
Start: 2024-03-25

## 2024-03-25 RX ORDER — ONDANSETRON 2 MG/ML
8 INJECTION INTRAMUSCULAR; INTRAVENOUS
OUTPATIENT
Start: 2024-03-25

## 2024-03-25 RX ORDER — SODIUM CHLORIDE 9 MG/ML
INJECTION, SOLUTION INTRAVENOUS CONTINUOUS
OUTPATIENT
Start: 2024-03-25

## 2024-03-25 RX ORDER — ALBUTEROL SULFATE 90 UG/1
4 AEROSOL, METERED RESPIRATORY (INHALATION) PRN
OUTPATIENT
Start: 2024-03-25

## 2024-03-25 RX ORDER — HEPARIN SODIUM (PORCINE) LOCK FLUSH IV SOLN 100 UNIT/ML 100 UNIT/ML
500 SOLUTION INTRAVENOUS PRN
OUTPATIENT
Start: 2024-03-25

## 2024-03-25 RX ORDER — SODIUM CHLORIDE 0.9 % (FLUSH) 0.9 %
5-40 SYRINGE (ML) INJECTION PRN
OUTPATIENT
Start: 2024-03-25

## 2024-03-25 RX ORDER — DIPHENHYDRAMINE HYDROCHLORIDE 50 MG/ML
50 INJECTION INTRAMUSCULAR; INTRAVENOUS
OUTPATIENT
Start: 2024-03-25

## 2024-03-25 RX ORDER — MEPERIDINE HYDROCHLORIDE 50 MG/ML
12.5 INJECTION INTRAMUSCULAR; INTRAVENOUS; SUBCUTANEOUS PRN
OUTPATIENT
Start: 2024-03-25

## 2024-03-25 RX ADMIN — SODIUM CHLORIDE 400 MG: 9 INJECTION, SOLUTION INTRAVENOUS at 15:13

## 2024-03-26 RX ORDER — PREGABALIN 100 MG/1
100 CAPSULE ORAL 2 TIMES DAILY
Qty: 60 CAPSULE | Refills: 0 | Status: SHIPPED | OUTPATIENT
Start: 2024-03-26 | End: 2024-04-25

## 2024-04-11 ENCOUNTER — HOSPITAL ENCOUNTER (OUTPATIENT)
Dept: NON INVASIVE DIAGNOSTICS | Age: 54
Discharge: HOME | End: 2024-04-11
Payer: COMMERCIAL

## 2024-04-11 VITALS — BODY MASS INDEX: 37.67 KG/M2 | WEIGHT: 277.78 LBS

## 2024-04-11 DIAGNOSIS — I25.10 CORONARY ARTERY CALCIFICATION: ICD-10-CM

## 2024-04-11 DIAGNOSIS — I25.84 CORONARY ARTERY CALCIFICATION: ICD-10-CM

## 2024-04-11 DIAGNOSIS — I10 PRIMARY HYPERTENSION: ICD-10-CM

## 2024-04-11 PROCEDURE — 6360000004 HC RX CONTRAST MEDICATION: Performed by: CLINICAL NURSE SPECIALIST

## 2024-04-11 PROCEDURE — 6360000002 HC RX W HCPCS: Performed by: CLINICAL NURSE SPECIALIST

## 2024-04-11 PROCEDURE — C8929 TTE W OR WO FOL WCON,DOPPLER: HCPCS

## 2024-04-11 PROCEDURE — 2500000003 HC RX 250 WO HCPCS: Performed by: CLINICAL NURSE SPECIALIST

## 2024-04-11 PROCEDURE — C8928 TTE W OR W/O FOL W/CON,STRES: HCPCS

## 2024-04-11 RX ORDER — SODIUM CHLORIDE 9 MG/ML
INJECTION, SOLUTION INTRAVENOUS
Status: DISCONTINUED | OUTPATIENT
Start: 2024-04-11 | End: 2024-04-12 | Stop reason: HOSPADM

## 2024-04-11 RX ORDER — ATROPINE SULFATE 0.1 MG/ML
1 INJECTION INTRAVENOUS PRN
Status: DISCONTINUED | OUTPATIENT
Start: 2024-04-11 | End: 2024-04-12 | Stop reason: HOSPADM

## 2024-04-11 RX ORDER — METOPROLOL TARTRATE 1 MG/ML
5 INJECTION, SOLUTION INTRAVENOUS PRN
Status: DISCONTINUED | OUTPATIENT
Start: 2024-04-11 | End: 2024-04-12 | Stop reason: HOSPADM

## 2024-04-11 RX ORDER — DOBUTAMINE HYDROCHLORIDE 200 MG/100ML
10 INJECTION INTRAVENOUS CONTINUOUS PRN
Status: DISCONTINUED | OUTPATIENT
Start: 2024-04-11 | End: 2024-04-12 | Stop reason: HOSPADM

## 2024-04-11 RX ADMIN — PERFLUTREN 1.5 ML: 6.52 INJECTION, SUSPENSION INTRAVENOUS at 10:40

## 2024-04-11 RX ADMIN — ATROPINE SULFATE 1 MG: 0.1 INJECTION, SOLUTION ENDOTRACHEAL; INTRAMUSCULAR; INTRAVENOUS; SUBCUTANEOUS at 10:54

## 2024-04-11 RX ADMIN — DOBUTAMINE HYDROCHLORIDE 10 MCG/KG/MIN: 200 INJECTION INTRAVENOUS at 10:45

## 2024-04-11 RX ADMIN — METOPROLOL TARTRATE 3 MG: 1 INJECTION, SOLUTION INTRAVENOUS at 10:58

## 2024-04-12 ENCOUNTER — TELEPHONE (OUTPATIENT)
Dept: CARDIOLOGY CLINIC | Age: 54
End: 2024-04-12

## 2024-04-12 ENCOUNTER — TELEPHONE (OUTPATIENT)
Dept: VASCULAR SURGERY | Age: 54
End: 2024-04-12

## 2024-04-12 NOTE — TELEPHONE ENCOUNTER
----- Message from KIET Sung sent at 4/11/2024  4:05 PM CDT -----  Echo shows overall normal heart function.  Some mild thickening of the heart muscle wall.  1 small area that showed some mild weakness.  All valves look good.  Also had stress test and that looked good with no evidence of any blockage.  Will continue medical management and follow-up in a year or sooner if develops any symptoms

## 2024-04-12 NOTE — TELEPHONE ENCOUNTER
----- Message from KIET Sung sent at 4/11/2024  4:03 PM CDT -----  Dobutamine stress echo showed no evidence of any blockage.  Also had 2D echo

## 2024-04-24 DIAGNOSIS — E78.5 HYPERLIPIDEMIA, UNSPECIFIED HYPERLIPIDEMIA TYPE: ICD-10-CM

## 2024-04-25 RX ORDER — ATORVASTATIN CALCIUM 80 MG/1
TABLET, FILM COATED ORAL
Qty: 30 TABLET | Refills: 0 | Status: SHIPPED | OUTPATIENT
Start: 2024-04-25

## 2024-05-02 NOTE — PROGRESS NOTES
MEDICAL ONCOLOGY PROGRESS NOTE                                                          Fracisco Reeves   1970 5/6/2024     Chief Complaint   Patient presents with    Cancer      INTERVAL HISTORY/HISTORY OF PRESENT ILLNESS:  Reason for MD visit-toxicity assessment disease management  The patient is currently receiving adjuvant immunotherapy with pembrolizumab for his stage III renal cell carcinoma, clear-cell subtype.  Tolerated treatment relatively well.  In addition, history of provoked bilateral lower extremity DVT.  The patient is currently on Eliquis.  He is compliant with Eliquis.  Denies any bleeding.  He continues to complain of significant bilateral lower extremity edema.        Diagnosis  Renal cell carcinoma, clear-cell, Aug 2023  [id number], stage III  Provoked bilateral lower extremity DVT, Aug 2023  Factor V leiden-heterozygous positive     Treatment summary  8/15/23 UFH heparin-> Eliquis  8/22/2023 Right nephrectomy with IVC thrombectomyby Dr Lopez Wilkerson at [facility]  10/2/23 Initiated adjuvant Pembrolizumab 200mg every 3 weeks x17 cycles/1 year-6 cycles 200mg received  2/12/24 Change Pembrolizumab to 400mg every 6 weeks x6 additional cycles to complete 1 year     Cancer history  Fracisco Reeves was first seen by me on 8/15/2023 during patient was positioned [facility].  The patient has a history of type 2 diabetes, hypertension hyperlipidemia, chronic back pain, LI with CPAP, history of depression.  He presented to the ER department at [facility] complaints of bilateral lower extremity swelling for about 6 weeks.  This is getting progressively worse.  In addition, complains of right flank pain.  Patient denies any hematuria.  Patient reports increased fatigue. Laboratory work-up in the emergency remarkable for neutrophil leukocytosis, elevated creatinine/decreased GFR, mild elevation of bilirubin.  Imaging studies as below  8/15/2023-CT abdomen/pelvis w/o ([facility]):  Liver:

## 2024-05-06 ENCOUNTER — HOSPITAL ENCOUNTER (OUTPATIENT)
Dept: INFUSION THERAPY | Age: 54
Discharge: HOME | End: 2024-05-06
Payer: COMMERCIAL

## 2024-05-06 ENCOUNTER — OFFICE VISIT (OUTPATIENT)
Dept: HEMATOLOGY | Age: 54
End: 2024-05-06
Payer: COMMERCIAL

## 2024-05-06 VITALS
HEIGHT: 72 IN | SYSTOLIC BLOOD PRESSURE: 129 MMHG | HEART RATE: 83 BPM | OXYGEN SATURATION: 99 % | DIASTOLIC BLOOD PRESSURE: 67 MMHG | RESPIRATION RATE: 20 BRPM | TEMPERATURE: 97.7 F | BODY MASS INDEX: 36.75 KG/M2 | WEIGHT: 271.3 LBS

## 2024-05-06 DIAGNOSIS — Z71.89 CARE PLAN DISCUSSED WITH PATIENT: ICD-10-CM

## 2024-05-06 DIAGNOSIS — I82.503 CHRONIC DEEP VEIN THROMBOSIS (DVT) OF BOTH LOWER EXTREMITIES, UNSPECIFIED VEIN (HCC): ICD-10-CM

## 2024-05-06 DIAGNOSIS — D64.81 ANTINEOPLASTIC CHEMOTHERAPY INDUCED ANEMIA: ICD-10-CM

## 2024-05-06 DIAGNOSIS — T45.1X5A ANTINEOPLASTIC CHEMOTHERAPY INDUCED ANEMIA: ICD-10-CM

## 2024-05-06 DIAGNOSIS — Z51.12 ENCOUNTER FOR ANTINEOPLASTIC IMMUNOTHERAPY: ICD-10-CM

## 2024-05-06 DIAGNOSIS — R53.0 NEOPLASTIC MALIGNANT RELATED FATIGUE: ICD-10-CM

## 2024-05-06 DIAGNOSIS — R53.83 FATIGUE DUE TO TREATMENT: ICD-10-CM

## 2024-05-06 DIAGNOSIS — C64.9 RENAL CELL CARCINOMA OF KIDNEY EXCLUDING RENAL PELVIS (HCC): Primary | ICD-10-CM

## 2024-05-06 DIAGNOSIS — R60.0 BILATERAL LEG EDEMA: ICD-10-CM

## 2024-05-06 DIAGNOSIS — D68.51 FACTOR 5 LEIDEN MUTATION, HETEROZYGOUS (HCC): ICD-10-CM

## 2024-05-06 DIAGNOSIS — Z79.01 CHRONIC ANTICOAGULATION: ICD-10-CM

## 2024-05-06 DIAGNOSIS — C64.9 RENAL CELL CARCINOMA OF KIDNEY EXCLUDING RENAL PELVIS (HCC): ICD-10-CM

## 2024-05-06 DIAGNOSIS — E03.2 DRUG-INDUCED HYPOTHYROIDISM: ICD-10-CM

## 2024-05-06 DIAGNOSIS — R17 ELEVATED BILIRUBIN: ICD-10-CM

## 2024-05-06 LAB
ALBUMIN SERPL-MCNC: 4.1 G/DL (ref 3.5–5.2)
ALP SERPL-CCNC: 55 U/L (ref 40–130)
ALT SERPL-CCNC: 13 U/L (ref 21–72)
ANION GAP SERPL CALCULATED.3IONS-SCNC: 10 MMOL/L (ref 7–19)
AST SERPL-CCNC: 24 U/L (ref 17–59)
BASOPHILS # BLD: 0.07 K/UL (ref 0.01–0.08)
BASOPHILS NFR BLD: 1.1 % (ref 0.1–1.2)
BILIRUB SERPL-MCNC: 1.8 MG/DL (ref 0.2–1.3)
BUN SERPL-MCNC: 15 MG/DL (ref 9–20)
CALCIUM SERPL-MCNC: 9 MG/DL (ref 8.4–10.2)
CHLORIDE SERPL-SCNC: 101 MMOL/L (ref 98–111)
CO2 SERPL-SCNC: 26 MMOL/L (ref 22–29)
CORTIS PM SERPL-MCNC: 0.4 UG/DL (ref 2.3–11.9)
CREAT SERPL-MCNC: 1.2 MG/DL (ref 0.6–1.2)
EOSINOPHIL # BLD: 0.58 K/UL (ref 0.04–0.54)
EOSINOPHIL NFR BLD: 9.5 % (ref 0.7–7)
ERYTHROCYTE [DISTWIDTH] IN BLOOD BY AUTOMATED COUNT: 12.9 % (ref 11.6–14.4)
GLOBULIN: 2.6 G/DL
GLUCOSE SERPL-MCNC: 131 MG/DL (ref 74–106)
HCT VFR BLD AUTO: 37.4 % (ref 40.1–51)
HGB BLD-MCNC: 12.7 G/DL (ref 13.7–17.5)
LYMPHOCYTES # BLD: 1.58 K/UL (ref 1.18–3.74)
LYMPHOCYTES NFR BLD: 25.9 % (ref 19.3–53.1)
MCH RBC QN AUTO: 29.8 PG (ref 25.7–32.2)
MCHC RBC AUTO-ENTMCNC: 34 G/DL (ref 32.3–36.5)
MCV RBC AUTO: 87.8 FL (ref 79–92.2)
MONOCYTES # BLD: 0.42 K/UL (ref 0.24–0.82)
MONOCYTES NFR BLD: 6.9 % (ref 4.7–12.5)
NEUTROPHILS # BLD: 3.44 K/UL (ref 1.56–6.13)
NEUTS SEG NFR BLD: 56.4 % (ref 34–71.1)
PLATELET # BLD AUTO: 169 K/UL (ref 163–337)
PMV BLD AUTO: 10.6 FL (ref 7.4–10.4)
POTASSIUM SERPL-SCNC: 3.8 MMOL/L (ref 3.5–5.1)
PROT SERPL-MCNC: 6.7 G/DL (ref 6.3–8.2)
RBC # BLD AUTO: 4.26 M/UL (ref 4.63–6.08)
SODIUM SERPL-SCNC: 137 MMOL/L (ref 137–145)
T4 FREE SERPL-MCNC: 0.8 NG/DL (ref 0.93–1.7)
TSH SERPL DL<=0.005 MIU/L-ACNC: 8.87 UIU/ML (ref 0.27–4.2)
WBC # BLD AUTO: 6.1 K/UL (ref 4.23–9.07)

## 2024-05-06 PROCEDURE — 96413 CHEMO IV INFUSION 1 HR: CPT

## 2024-05-06 PROCEDURE — 80053 COMPREHEN METABOLIC PANEL: CPT

## 2024-05-06 PROCEDURE — G2211 COMPLEX E/M VISIT ADD ON: HCPCS | Performed by: INTERNAL MEDICINE

## 2024-05-06 PROCEDURE — 99215 OFFICE O/P EST HI 40 MIN: CPT | Performed by: INTERNAL MEDICINE

## 2024-05-06 PROCEDURE — 36415 COLL VENOUS BLD VENIPUNCTURE: CPT

## 2024-05-06 PROCEDURE — 2580000003 HC RX 258: Performed by: INTERNAL MEDICINE

## 2024-05-06 PROCEDURE — 6360000002 HC RX W HCPCS: Performed by: INTERNAL MEDICINE

## 2024-05-06 PROCEDURE — 85025 COMPLETE CBC W/AUTO DIFF WBC: CPT

## 2024-05-06 PROCEDURE — 3074F SYST BP LT 130 MM HG: CPT | Performed by: INTERNAL MEDICINE

## 2024-05-06 PROCEDURE — 3078F DIAST BP <80 MM HG: CPT | Performed by: INTERNAL MEDICINE

## 2024-05-06 RX ORDER — HEPARIN SODIUM (PORCINE) LOCK FLUSH IV SOLN 100 UNIT/ML 100 UNIT/ML
500 SOLUTION INTRAVENOUS PRN
Status: CANCELLED | OUTPATIENT
Start: 2024-05-06

## 2024-05-06 RX ORDER — ONDANSETRON 2 MG/ML
8 INJECTION INTRAMUSCULAR; INTRAVENOUS
Status: CANCELLED | OUTPATIENT
Start: 2024-05-06

## 2024-05-06 RX ORDER — EPINEPHRINE 1 MG/ML
0.3 INJECTION, SOLUTION, CONCENTRATE INTRAVENOUS PRN
Status: CANCELLED | OUTPATIENT
Start: 2024-05-06

## 2024-05-06 RX ORDER — FAMOTIDINE 10 MG/ML
20 INJECTION, SOLUTION INTRAVENOUS
Status: CANCELLED | OUTPATIENT
Start: 2024-05-06

## 2024-05-06 RX ORDER — SODIUM CHLORIDE 0.9 % (FLUSH) 0.9 %
5-40 SYRINGE (ML) INJECTION PRN
Status: CANCELLED | OUTPATIENT
Start: 2024-05-06

## 2024-05-06 RX ORDER — SODIUM CHLORIDE 9 MG/ML
5-250 INJECTION, SOLUTION INTRAVENOUS PRN
Status: CANCELLED | OUTPATIENT
Start: 2024-05-06

## 2024-05-06 RX ORDER — ACETAMINOPHEN 325 MG/1
650 TABLET ORAL
Status: CANCELLED | OUTPATIENT
Start: 2024-05-06

## 2024-05-06 RX ORDER — SODIUM CHLORIDE 9 MG/ML
INJECTION, SOLUTION INTRAVENOUS CONTINUOUS
Status: CANCELLED | OUTPATIENT
Start: 2024-05-06

## 2024-05-06 RX ORDER — ALBUTEROL SULFATE 90 UG/1
4 AEROSOL, METERED RESPIRATORY (INHALATION) PRN
Status: CANCELLED | OUTPATIENT
Start: 2024-05-06

## 2024-05-06 RX ORDER — MEPERIDINE HYDROCHLORIDE 50 MG/ML
12.5 INJECTION INTRAMUSCULAR; INTRAVENOUS; SUBCUTANEOUS PRN
Status: CANCELLED | OUTPATIENT
Start: 2024-05-06

## 2024-05-06 RX ORDER — DIPHENHYDRAMINE HYDROCHLORIDE 50 MG/ML
50 INJECTION INTRAMUSCULAR; INTRAVENOUS
Status: CANCELLED | OUTPATIENT
Start: 2024-05-06

## 2024-05-06 RX ADMIN — SODIUM CHLORIDE 400 MG: 9 INJECTION, SOLUTION INTRAVENOUS at 15:08

## 2024-05-06 NOTE — PROGRESS NOTES
Lab Results   Component Value Date    WBC 6.10 05/06/2024    HGB 12.7 (L) 05/06/2024    HCT 37.4 (L) 05/06/2024    MCV 87.8 05/06/2024     05/06/2024     Lab Results   Component Value Date    NEUTROABS 3.44 05/06/2024

## 2024-05-19 DIAGNOSIS — Z79.4 TYPE 2 DIABETES MELLITUS WITHOUT COMPLICATION, WITH LONG-TERM CURRENT USE OF INSULIN (HCC): ICD-10-CM

## 2024-05-19 DIAGNOSIS — E11.9 TYPE 2 DIABETES MELLITUS WITHOUT COMPLICATION, WITH LONG-TERM CURRENT USE OF INSULIN (HCC): ICD-10-CM

## 2024-05-23 ENCOUNTER — PATIENT MESSAGE (OUTPATIENT)
Dept: HEMATOLOGY | Age: 54
End: 2024-05-23

## 2024-05-23 ENCOUNTER — OFFICE VISIT (OUTPATIENT)
Dept: FAMILY MEDICINE CLINIC | Age: 54
End: 2024-05-23
Payer: COMMERCIAL

## 2024-05-23 ENCOUNTER — TELEPHONE (OUTPATIENT)
Dept: FAMILY MEDICINE CLINIC | Age: 54
End: 2024-05-23

## 2024-05-23 VITALS
WEIGHT: 259 LBS | DIASTOLIC BLOOD PRESSURE: 72 MMHG | BODY MASS INDEX: 35.08 KG/M2 | SYSTOLIC BLOOD PRESSURE: 128 MMHG | HEIGHT: 72 IN | HEART RATE: 88 BPM | TEMPERATURE: 97.7 F | OXYGEN SATURATION: 98 %

## 2024-05-23 DIAGNOSIS — Z79.4 TYPE 2 DIABETES MELLITUS WITHOUT COMPLICATION, WITH LONG-TERM CURRENT USE OF INSULIN (HCC): ICD-10-CM

## 2024-05-23 DIAGNOSIS — E11.9 TYPE 2 DIABETES MELLITUS WITHOUT COMPLICATION, WITH LONG-TERM CURRENT USE OF INSULIN (HCC): ICD-10-CM

## 2024-05-23 DIAGNOSIS — G47.33 OSA (OBSTRUCTIVE SLEEP APNEA): Primary | ICD-10-CM

## 2024-05-23 DIAGNOSIS — E78.5 HYPERLIPIDEMIA, UNSPECIFIED HYPERLIPIDEMIA TYPE: ICD-10-CM

## 2024-05-23 DIAGNOSIS — K21.9 GASTROESOPHAGEAL REFLUX DISEASE, UNSPECIFIED WHETHER ESOPHAGITIS PRESENT: ICD-10-CM

## 2024-05-23 DIAGNOSIS — R60.0 BILATERAL LOWER EXTREMITY EDEMA: ICD-10-CM

## 2024-05-23 LAB — HBA1C MFR BLD: 5.4 %

## 2024-05-23 PROCEDURE — 83036 HEMOGLOBIN GLYCOSYLATED A1C: CPT | Performed by: FAMILY MEDICINE

## 2024-05-23 PROCEDURE — 3074F SYST BP LT 130 MM HG: CPT | Performed by: FAMILY MEDICINE

## 2024-05-23 PROCEDURE — 99214 OFFICE O/P EST MOD 30 MIN: CPT | Performed by: FAMILY MEDICINE

## 2024-05-23 PROCEDURE — 3078F DIAST BP <80 MM HG: CPT | Performed by: FAMILY MEDICINE

## 2024-05-23 PROCEDURE — 3044F HG A1C LEVEL LT 7.0%: CPT | Performed by: FAMILY MEDICINE

## 2024-05-23 RX ORDER — ATORVASTATIN CALCIUM 20 MG/1
20 TABLET, FILM COATED ORAL DAILY
Qty: 90 TABLET | Refills: 1 | Status: ON HOLD | OUTPATIENT
Start: 2024-05-23

## 2024-05-23 RX ORDER — BUMETANIDE 1 MG/1
1 TABLET ORAL DAILY
Status: ON HOLD | COMMUNITY
Start: 2024-04-24

## 2024-05-23 RX ORDER — OMEPRAZOLE 40 MG/1
40 CAPSULE, DELAYED RELEASE ORAL
Qty: 90 CAPSULE | Refills: 1 | Status: ON HOLD | OUTPATIENT
Start: 2024-05-23

## 2024-05-23 SDOH — ECONOMIC STABILITY: FOOD INSECURITY: WITHIN THE PAST 12 MONTHS, THE FOOD YOU BOUGHT JUST DIDN'T LAST AND YOU DIDN'T HAVE MONEY TO GET MORE.: NEVER TRUE

## 2024-05-23 SDOH — ECONOMIC STABILITY: HOUSING INSECURITY
IN THE LAST 12 MONTHS, WAS THERE A TIME WHEN YOU DID NOT HAVE A STEADY PLACE TO SLEEP OR SLEPT IN A SHELTER (INCLUDING NOW)?: NO

## 2024-05-23 SDOH — ECONOMIC STABILITY: FOOD INSECURITY: WITHIN THE PAST 12 MONTHS, YOU WORRIED THAT YOUR FOOD WOULD RUN OUT BEFORE YOU GOT MONEY TO BUY MORE.: NEVER TRUE

## 2024-05-23 SDOH — ECONOMIC STABILITY: INCOME INSECURITY: HOW HARD IS IT FOR YOU TO PAY FOR THE VERY BASICS LIKE FOOD, HOUSING, MEDICAL CARE, AND HEATING?: NOT HARD AT ALL

## 2024-05-23 ASSESSMENT — PATIENT HEALTH QUESTIONNAIRE - PHQ9
5. POOR APPETITE OR OVEREATING: NOT AT ALL
4. FEELING TIRED OR HAVING LITTLE ENERGY: NOT AT ALL
9. THOUGHTS THAT YOU WOULD BE BETTER OFF DEAD, OR OF HURTING YOURSELF: NOT AT ALL
SUM OF ALL RESPONSES TO PHQ QUESTIONS 1-9: 0
2. FEELING DOWN, DEPRESSED OR HOPELESS: NOT AT ALL
SUM OF ALL RESPONSES TO PHQ QUESTIONS 1-9: 0
1. LITTLE INTEREST OR PLEASURE IN DOING THINGS: NOT AT ALL
8. MOVING OR SPEAKING SO SLOWLY THAT OTHER PEOPLE COULD HAVE NOTICED. OR THE OPPOSITE, BEING SO FIGETY OR RESTLESS THAT YOU HAVE BEEN MOVING AROUND A LOT MORE THAN USUAL: NOT AT ALL
SUM OF ALL RESPONSES TO PHQ QUESTIONS 1-9: 0
6. FEELING BAD ABOUT YOURSELF - OR THAT YOU ARE A FAILURE OR HAVE LET YOURSELF OR YOUR FAMILY DOWN: NOT AT ALL
7. TROUBLE CONCENTRATING ON THINGS, SUCH AS READING THE NEWSPAPER OR WATCHING TELEVISION: NOT AT ALL
3. TROUBLE FALLING OR STAYING ASLEEP: NOT AT ALL
10. IF YOU CHECKED OFF ANY PROBLEMS, HOW DIFFICULT HAVE THESE PROBLEMS MADE IT FOR YOU TO DO YOUR WORK, TAKE CARE OF THINGS AT HOME, OR GET ALONG WITH OTHER PEOPLE: NOT DIFFICULT AT ALL
SUM OF ALL RESPONSES TO PHQ QUESTIONS 1-9: 0
SUM OF ALL RESPONSES TO PHQ9 QUESTIONS 1 & 2: 0

## 2024-05-23 NOTE — PROGRESS NOTES
.   [facility]ACK[facility] DAR[facility] [address] [place] [address]  Dept: [phone]  Dept Fax: [phone]    Visit type: Established patient    Reason for Visit: Sleep Apnea, Referral - General (Vascular ), Anorexia, Hiccups, Edema, and Arm Pain      Assessment & Plan   Assessment and Plan       1. LI (obstructive sleep apnea)  -     Home Sleep Study; Future  -     External Referral To ENT  2. Hyperlipidemia, unspecified hyperlipidemia type  -     Lipid Panel; Future  -     atorvastatin (LIPITOR) 20 MG tablet; Take 1 tablet by mouth daily, Disp-90 tablet, R-1Normal  3. Bilateral lower extremity edema  -     External Referral To Vascular Surgery  4. Gastroesophageal reflux disease, unspecified whether esophagitis present  -     omeprazole (PRILOSEC) 40 MG delayed release capsule; Take 1 capsule by mouth every morning (before breakfast), Disp-90 capsule, R-1Normal  5. Type 2 diabetes mellitus without complication, with long-term current use of insulin (HCC)  -     POCT glycosylated hemoglobin (Hb A1C)      Discussed that since it has been so long since he has had a sleep study and since he really has not been compliant with the CPAP that we will probably need to update his sleep study and we will also work on getting him referred to the specialist for further discussion for the inspire device to see if he is a candidate.  With his lower extremity edema and interested in seeing vascular surgery for further evaluation recommendations we will get him set up for second opinion and further recommendations.  Discussed proper use of Lipitor and potential side effects.  Discussed possibility of a trial of Prilosec in efforts of improving his reflux symptoms.  Stressed importance of being diligent with his diabetic diet and medications.  Discussed signs and symptoms requiring medical attention.  All questions were answered and patient voiced understanding and agreement with plan as discussed.    Return in about 3 months

## 2024-05-24 NOTE — TELEPHONE ENCOUNTER
From: Fracisco Reeves  To: Dr. Kenia Navarro  Sent: 5/23/2024 3:41 PM CDT  Subject: Cancer scan    Is it possible to move up the cancer scan? I am having similar problems as when this started...hicups Indigestion

## 2024-05-28 ENCOUNTER — TELEPHONE (OUTPATIENT)
Dept: VASCULAR SURGERY | Age: 54
End: 2024-05-28

## 2024-05-28 NOTE — TELEPHONE ENCOUNTER
Called and left the patient a message regarding his mychart message from the weekend.  I let him know that per Tamanna his leg was surgical ligated at a different hospital.  The treatment for this is blood thinners and compression socks.  If he feels he needs to get a second opinion we would be glad to send him in a referral for his peace of mind.  Their suggestions for treatment will most likely be the same but he should be sure.  I left our office number should the patient want to call back to discuss.              ----- Message from Fracisco Reeves sent at 5/26/2024  2:29 PM CDT -----  Regarding: Changing docs  Contact: [phone]  I think I'm changing vascular doc to the [facility]. I have a serious condition and all I'm told is wear socks! Any advice? I'm just trying to get better? I'm not even sure I see the doc or an assistant.

## 2024-05-29 RX ORDER — RABEPRAZOLE SODIUM 20 MG/1
20 TABLET, DELAYED RELEASE ORAL DAILY
Qty: 30 TABLET | Refills: 3 | Status: ON HOLD | OUTPATIENT
Start: 2024-05-29

## 2024-05-30 ASSESSMENT — ENCOUNTER SYMPTOMS
ABDOMINAL PAIN: 0
VOMITING: 0
BACK PAIN: 0
COUGH: 0
DIARRHEA: 0
CONSTIPATION: 0
NAUSEA: 0
SHORTNESS OF BREATH: 0
RHINORRHEA: 0

## 2024-06-01 ENCOUNTER — APPOINTMENT (OUTPATIENT)
Dept: GENERAL RADIOLOGY | Age: 54
DRG: 683 | End: 2024-06-01
Payer: COMMERCIAL

## 2024-06-01 ENCOUNTER — HOSPITAL ENCOUNTER (INPATIENT)
Age: 54
LOS: 2 days | Discharge: HOME | DRG: 683 | End: 2024-06-03
Attending: STUDENT IN AN ORGANIZED HEALTH CARE EDUCATION/TRAINING PROGRAM | Admitting: INTERNAL MEDICINE
Payer: COMMERCIAL

## 2024-06-01 ENCOUNTER — APPOINTMENT (OUTPATIENT)
Dept: CT IMAGING | Age: 54
DRG: 683 | End: 2024-06-01
Payer: COMMERCIAL

## 2024-06-01 DIAGNOSIS — N17.9 AKI (ACUTE KIDNEY INJURY) (HCC): Primary | ICD-10-CM

## 2024-06-01 DIAGNOSIS — R07.9 CHEST PAIN, UNSPECIFIED TYPE: ICD-10-CM

## 2024-06-01 DIAGNOSIS — R45.851 SUICIDAL IDEATION: ICD-10-CM

## 2024-06-01 LAB
ALBUMIN SERPL-MCNC: 3.8 G/DL (ref 3.5–5.2)
ALP SERPL-CCNC: 51 U/L (ref 40–130)
ALT SERPL-CCNC: 9 U/L (ref 5–41)
AMPHET UR QL SCN: NEGATIVE
ANION GAP SERPL CALCULATED.3IONS-SCNC: 15 MMOL/L (ref 7–19)
AST SERPL-CCNC: 16 U/L (ref 5–40)
BACTERIA URNS QL MICRO: NEGATIVE /HPF
BARBITURATES UR QL SCN: NEGATIVE
BASOPHILS # BLD: 0.1 K/UL (ref 0–0.2)
BASOPHILS NFR BLD: 0.8 % (ref 0–1)
BENZODIAZ UR QL SCN: NEGATIVE
BILIRUB SERPL-MCNC: 1.7 MG/DL (ref 0.2–1.2)
BILIRUB UR QL STRIP: NEGATIVE
BUN SERPL-MCNC: 25 MG/DL (ref 6–20)
BUPRENORPHINE URINE: NEGATIVE
CALCIUM SERPL-MCNC: 9.1 MG/DL (ref 8.6–10)
CANNABINOIDS UR QL SCN: POSITIVE
CHLORIDE SERPL-SCNC: 97 MMOL/L (ref 98–111)
CLARITY UR: ABNORMAL
CO2 SERPL-SCNC: 23 MMOL/L (ref 22–29)
COCAINE UR QL SCN: NEGATIVE
COLOR UR: ABNORMAL
CREAT SERPL-MCNC: 2 MG/DL (ref 0.5–1.2)
CREAT UR-MCNC: 269.3 MG/DL (ref 39–259)
CRYSTALS URNS MICRO: ABNORMAL /HPF
D DIMER PPP FEU-MCNC: 0.76 UG/ML FEU (ref 0–0.48)
DRUG SCREEN COMMENT UR-IMP: ABNORMAL
EOSINOPHIL # BLD: 0.6 K/UL (ref 0–0.6)
EOSINOPHIL NFR BLD: 5.4 % (ref 0–5)
EOSINOPHIL URNS QL MICRO: NORMAL
EPI CELLS #/AREA URNS AUTO: 1 /HPF (ref 0–5)
ERYTHROCYTE [DISTWIDTH] IN BLOOD BY AUTOMATED COUNT: 12.6 % (ref 11.5–14.5)
ETHANOLAMINE SERPL-MCNC: <10 MG/DL (ref 0–0.08)
FENTANYL SCREEN, URINE: NEGATIVE
GLUCOSE BLD-MCNC: 106 MG/DL (ref 70–99)
GLUCOSE SERPL-MCNC: 140 MG/DL (ref 74–109)
GLUCOSE UR STRIP.AUTO-MCNC: NEGATIVE MG/DL
HBA1C MFR BLD: 5.1 % (ref 4–6)
HCT VFR BLD AUTO: 35.4 % (ref 42–52)
HGB BLD-MCNC: 11.7 G/DL (ref 14–18)
HGB UR STRIP.AUTO-MCNC: NEGATIVE MG/L
HYALINE CASTS #/AREA URNS AUTO: 18 /HPF (ref 0–8)
IMM GRANULOCYTES # BLD: 0 K/UL
KETONES UR STRIP.AUTO-MCNC: 15 MG/DL
LEUKOCYTE ESTERASE UR QL STRIP.AUTO: NEGATIVE
LIPASE SERPL-CCNC: 38 U/L (ref 13–60)
LYMPHOCYTES # BLD: 1.8 K/UL (ref 1.1–4.5)
LYMPHOCYTES NFR BLD: 17.4 % (ref 20–40)
MCH RBC QN AUTO: 28.5 PG (ref 27–31)
MCHC RBC AUTO-ENTMCNC: 33.1 G/DL (ref 33–37)
MCV RBC AUTO: 86.3 FL (ref 80–94)
METHADONE UR QL SCN: NEGATIVE
METHAMPHETAMINE, URINE: NEGATIVE
MONOCYTES # BLD: 1 K/UL (ref 0–0.9)
MONOCYTES NFR BLD: 9.6 % (ref 0–10)
NEUTROPHILS # BLD: 7 K/UL (ref 1.5–7.5)
NEUTS SEG NFR BLD: 66.6 % (ref 50–65)
NITRITE UR QL STRIP.AUTO: NEGATIVE
OPIATES UR QL SCN: NEGATIVE
OXYCODONE UR QL SCN: NEGATIVE
PCP UR QL SCN: NEGATIVE
PERFORMED ON: ABNORMAL
PH UR STRIP.AUTO: 5.5 [PH] (ref 5–8)
PLATELET # BLD AUTO: 190 K/UL (ref 130–400)
PMV BLD AUTO: 10.5 FL (ref 9.4–12.4)
POTASSIUM SERPL-SCNC: 4.4 MMOL/L (ref 3.5–5)
PROT SERPL-MCNC: 6.4 G/DL (ref 6.6–8.7)
PROT UR STRIP.AUTO-MCNC: 30 MG/DL
RBC # BLD AUTO: 4.1 M/UL (ref 4.7–6.1)
RBC #/AREA URNS AUTO: 2 /HPF (ref 0–4)
SODIUM SERPL-SCNC: 135 MMOL/L (ref 136–145)
SODIUM UR-SCNC: 31 MMOL/L
SP GR UR STRIP.AUTO: 1.02 (ref 1–1.03)
TRICYCLIC ANTIDEPRESSANTS, UR: NEGATIVE
TROPONIN, HIGH SENSITIVITY: 45 NG/L (ref 0–22)
UROBILINOGEN UR STRIP.AUTO-MCNC: 1 E.U./DL
WBC # BLD AUTO: 10.5 K/UL (ref 4.8–10.8)
WBC #/AREA URNS AUTO: 1 /HPF (ref 0–5)

## 2024-06-01 PROCEDURE — 82077 ASSAY SPEC XCP UR&BREATH IA: CPT

## 2024-06-01 PROCEDURE — 80053 COMPREHEN METABOLIC PANEL: CPT

## 2024-06-01 PROCEDURE — 83690 ASSAY OF LIPASE: CPT

## 2024-06-01 PROCEDURE — 2580000003 HC RX 258: Performed by: EMERGENCY MEDICINE

## 2024-06-01 PROCEDURE — 36415 COLL VENOUS BLD VENIPUNCTURE: CPT

## 2024-06-01 PROCEDURE — 99285 EMERGENCY DEPT VISIT HI MDM: CPT

## 2024-06-01 PROCEDURE — 85379 FIBRIN DEGRADATION QUANT: CPT

## 2024-06-01 PROCEDURE — 2100000000 HC CCU R&B

## 2024-06-01 PROCEDURE — 6360000002 HC RX W HCPCS

## 2024-06-01 PROCEDURE — 83036 HEMOGLOBIN GLYCOSYLATED A1C: CPT

## 2024-06-01 PROCEDURE — 70450 CT HEAD/BRAIN W/O DYE: CPT

## 2024-06-01 PROCEDURE — 96374 THER/PROPH/DIAG INJ IV PUSH: CPT

## 2024-06-01 PROCEDURE — 71045 X-RAY EXAM CHEST 1 VIEW: CPT

## 2024-06-01 PROCEDURE — 84484 ASSAY OF TROPONIN QUANT: CPT

## 2024-06-01 PROCEDURE — 85025 COMPLETE CBC W/AUTO DIFF WBC: CPT

## 2024-06-01 RX ORDER — NITROGLYCERIN 0.4 MG/1
0.4 TABLET SUBLINGUAL EVERY 5 MIN PRN
Status: DISCONTINUED | OUTPATIENT
Start: 2024-06-01 | End: 2024-06-03 | Stop reason: HOSPADM

## 2024-06-01 RX ORDER — ENOXAPARIN SODIUM 100 MG/ML
40 INJECTION SUBCUTANEOUS DAILY
Status: DISCONTINUED | OUTPATIENT
Start: 2024-06-01 | End: 2024-06-01 | Stop reason: DRUGHIGH

## 2024-06-01 RX ORDER — INSULIN LISPRO 100 [IU]/ML
0-4 INJECTION, SOLUTION INTRAVENOUS; SUBCUTANEOUS NIGHTLY
Status: DISCONTINUED | OUTPATIENT
Start: 2024-06-01 | End: 2024-06-03 | Stop reason: HOSPADM

## 2024-06-01 RX ORDER — LORAZEPAM 2 MG/ML
2 INJECTION INTRAMUSCULAR ONCE
Status: COMPLETED | OUTPATIENT
Start: 2024-06-01 | End: 2024-06-01

## 2024-06-01 RX ORDER — POTASSIUM CHLORIDE 7.45 MG/ML
10 INJECTION INTRAVENOUS PRN
Status: DISCONTINUED | OUTPATIENT
Start: 2024-06-01 | End: 2024-06-03 | Stop reason: HOSPADM

## 2024-06-01 RX ORDER — SODIUM CHLORIDE 9 MG/ML
INJECTION, SOLUTION INTRAVENOUS PRN
Status: DISCONTINUED | OUTPATIENT
Start: 2024-06-01 | End: 2024-06-03 | Stop reason: HOSPADM

## 2024-06-01 RX ORDER — POLYETHYLENE GLYCOL 3350 17 G/17G
17 POWDER, FOR SOLUTION ORAL DAILY PRN
Status: DISCONTINUED | OUTPATIENT
Start: 2024-06-01 | End: 2024-06-03 | Stop reason: HOSPADM

## 2024-06-01 RX ORDER — DEXTROSE MONOHYDRATE 100 MG/ML
INJECTION, SOLUTION INTRAVENOUS CONTINUOUS PRN
Status: DISCONTINUED | OUTPATIENT
Start: 2024-06-01 | End: 2024-06-03 | Stop reason: HOSPADM

## 2024-06-01 RX ORDER — ONDANSETRON 4 MG/1
4 TABLET, ORALLY DISINTEGRATING ORAL EVERY 8 HOURS PRN
Status: DISCONTINUED | OUTPATIENT
Start: 2024-06-01 | End: 2024-06-03 | Stop reason: HOSPADM

## 2024-06-01 RX ORDER — ONDANSETRON 2 MG/ML
4 INJECTION INTRAMUSCULAR; INTRAVENOUS EVERY 6 HOURS PRN
Status: DISCONTINUED | OUTPATIENT
Start: 2024-06-01 | End: 2024-06-03 | Stop reason: HOSPADM

## 2024-06-01 RX ORDER — SODIUM CHLORIDE 0.9 % (FLUSH) 0.9 %
5-40 SYRINGE (ML) INJECTION PRN
Status: DISCONTINUED | OUTPATIENT
Start: 2024-06-01 | End: 2024-06-03 | Stop reason: HOSPADM

## 2024-06-01 RX ORDER — INSULIN LISPRO 100 [IU]/ML
0-4 INJECTION, SOLUTION INTRAVENOUS; SUBCUTANEOUS
Status: DISCONTINUED | OUTPATIENT
Start: 2024-06-01 | End: 2024-06-03 | Stop reason: HOSPADM

## 2024-06-01 RX ORDER — MAGNESIUM SULFATE IN WATER 40 MG/ML
2000 INJECTION, SOLUTION INTRAVENOUS PRN
Status: DISCONTINUED | OUTPATIENT
Start: 2024-06-01 | End: 2024-06-03 | Stop reason: HOSPADM

## 2024-06-01 RX ORDER — ENOXAPARIN SODIUM 100 MG/ML
30 INJECTION SUBCUTANEOUS 2 TIMES DAILY
Status: DISCONTINUED | OUTPATIENT
Start: 2024-06-01 | End: 2024-06-01

## 2024-06-01 RX ORDER — POTASSIUM CHLORIDE 20 MEQ/1
40 TABLET, EXTENDED RELEASE ORAL PRN
Status: DISCONTINUED | OUTPATIENT
Start: 2024-06-01 | End: 2024-06-03 | Stop reason: HOSPADM

## 2024-06-01 RX ORDER — INSULIN GLARGINE 100 [IU]/ML
20 INJECTION, SOLUTION SUBCUTANEOUS NIGHTLY
Status: DISCONTINUED | OUTPATIENT
Start: 2024-06-01 | End: 2024-06-03 | Stop reason: HOSPADM

## 2024-06-01 RX ORDER — SODIUM CHLORIDE 0.9 % (FLUSH) 0.9 %
5-40 SYRINGE (ML) INJECTION EVERY 12 HOURS SCHEDULED
Status: DISCONTINUED | OUTPATIENT
Start: 2024-06-01 | End: 2024-06-03 | Stop reason: HOSPADM

## 2024-06-01 RX ORDER — SODIUM CHLORIDE, SODIUM LACTATE, POTASSIUM CHLORIDE, AND CALCIUM CHLORIDE .6; .31; .03; .02 G/100ML; G/100ML; G/100ML; G/100ML
1000 INJECTION, SOLUTION INTRAVENOUS ONCE
Status: COMPLETED | OUTPATIENT
Start: 2024-06-01 | End: 2024-06-01

## 2024-06-01 RX ORDER — ATORVASTATIN CALCIUM 40 MG/1
40 TABLET, FILM COATED ORAL NIGHTLY
Status: DISCONTINUED | OUTPATIENT
Start: 2024-06-01 | End: 2024-06-03 | Stop reason: HOSPADM

## 2024-06-01 RX ORDER — ACETAMINOPHEN 325 MG/1
650 TABLET ORAL EVERY 6 HOURS PRN
Status: DISCONTINUED | OUTPATIENT
Start: 2024-06-01 | End: 2024-06-03 | Stop reason: HOSPADM

## 2024-06-01 RX ORDER — LORAZEPAM 2 MG/ML
INJECTION INTRAMUSCULAR
Status: COMPLETED
Start: 2024-06-01 | End: 2024-06-01

## 2024-06-01 RX ORDER — SODIUM CHLORIDE 9 MG/ML
INJECTION, SOLUTION INTRAVENOUS CONTINUOUS
Status: DISCONTINUED | OUTPATIENT
Start: 2024-06-01 | End: 2024-06-03 | Stop reason: HOSPADM

## 2024-06-01 RX ORDER — ACETAMINOPHEN 650 MG/1
650 SUPPOSITORY RECTAL EVERY 6 HOURS PRN
Status: DISCONTINUED | OUTPATIENT
Start: 2024-06-01 | End: 2024-06-03 | Stop reason: HOSPADM

## 2024-06-01 RX ADMIN — SODIUM CHLORIDE, SODIUM LACTATE, POTASSIUM CHLORIDE, AND CALCIUM CHLORIDE 1000 ML: 600; 310; 30; 20 INJECTION, SOLUTION INTRAVENOUS at 18:16

## 2024-06-01 RX ADMIN — LORAZEPAM 2 MG: 2 INJECTION INTRAMUSCULAR at 18:03

## 2024-06-01 RX ADMIN — LORAZEPAM 2 MG: 2 INJECTION INTRAMUSCULAR; INTRAVENOUS at 18:03

## 2024-06-01 ASSESSMENT — PAIN SCALES - GENERAL: PAINLEVEL_OUTOF10: 0

## 2024-06-01 ASSESSMENT — PAIN - FUNCTIONAL ASSESSMENT: PAIN_FUNCTIONAL_ASSESSMENT: NONE - DENIES PAIN
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
## 2024-06-01 NOTE — ED PROVIDER NOTES
[facility] EMERGENCY DEPT  eMERGENCYdEPARTMENT eNCOUnter      Pt Name: Fracisco Reeves  MRN: [id number]  Birthdate 1970  Date of evaluation: 6/1/2024  Provider:BUD WALKER MD    Emergency Department care of this patient was assumed at 1755 from Dr. Quinteros.  We have discussed the case and the plan of care.  I have seen and evaluated patient and reviewed ED course.      CHIEF COMPLAINT       Chief Complaint   Patient presents with    Chest Pain     Chest pain, SOA, and left arm pain.            PHYSICAL EXAM    (up to 7 for level 4, 8 or more for level 5)     ED Triage Vitals   BP Temp Temp Source Pulse Respirations SpO2 Height Weight - Scale   06/01/24 1625 06/01/24 1627 06/01/24 1627 06/01/24 1625 06/01/24 1625 06/01/24 1625 -- 06/01/24 1625   94/64 97.8 °F (36.6 °C) Oral 93 20 90 %  122.5 kg (270 lb)       Physical Exam  Vitals and nursing note reviewed.         DIAGNOSTIC RESULTS     EKG: All EKG's are interpreted by the Emergency Department Physician who either signs or Co-signs this chart inthe absence of a cardiologist.        RADIOLOGY:   Non-plain film imagessuch as CT, Ultrasound and MRI are read by the radiologist. Plain radiographic images are visualized and preliminarily interpreted by the emergency physician with the below findings:      XR CHEST PORTABLE   Final Result      US RENAL COMPLETE    (Results Pending)           LABS:  Labs Reviewed   CBC WITH AUTO DIFFERENTIAL - Abnormal; Notable for the following components:       Result Value    RBC 4.10 (*)     Hemoglobin 11.7 (*)     Hematocrit 35.4 (*)     Neutrophils % 66.6 (*)     Lymphocytes % 17.4 (*)     Eosinophils % 5.4 (*)     Monocytes Absolute 1.00 (*)     All other components within normal limits   COMPREHENSIVE METABOLIC PANEL - Abnormal; Notable for the following components:    Sodium 135 (*)     Chloride 97 (*)     Glucose 140 (*)     BUN 25 (*)     Creatinine 2.0 (*)     Est, Glom Filt Rate 38 (*)     Total Protein 6.4 (*)     Total Bilirubin 1.7

## 2024-06-01 NOTE — H&P
----------------------------------------------------------------           ECHO Complete 2D W Doppler W Color  Order: [id number]  Status: Final result       Visible to patient: Yes (seen)       Next appt: 06/14/2024 at 01:00 PM in Radiology ([facility] LMP CT RM 1)       Dx: Primary hypertension; Coronary artery...    1 Result Note       1 Follow-up Encounter        Narrative & Impression    Transthoracic Echocardiography Report (TTE)      Demographics      Patient Name  HILARIO KING   Date of Study          04/11/2024      MRN           [id number]        Gender                 Male      Date of Birth 1970    Room Number      Age           53 year(s)      Height:       72 inches     Referring Physician    SOHEILA MERRILL      Weight:       280.01 pounds Sonographer            Gauri Bolaños, [facility]      BSA:          2.46 m^2      Interpreting Physician Zohreh Barnhart      BMI:          37.98 kg/m^2     Procedure     Type of Study      TTE procedure:ECHO 2D W/DOPPLER/COLOR/CONTRAST.     Study Location: Echo Lab  Technical Quality: Limited visualization due to poor acoustical window.     Patient Status: Outpatient     Contrast Medium: Definity. Amount - 5 ml     HR: 72 bpm BP: 126/58 mmHg     Indications:Hypertension.      Conclusions      Summary   Normal left ventricular size with preserved LV function and an estimated   ejection fraction of approximately 55-60%. Mild concentric left   ventricular hypertrophy. Small area of inferior wall scar/ thinning with   associated moderate hypokinesis. Normal diastolic function.   Normal right ventricular size with preserved RV function.   Normal bi-atrial size.   No clinically significant valvular stenosis or regurgitation.   Aortic root and ascending aorta dimensions are within normal limits.   The IVC is normal.   No evidence of significant pericardial effusion is noted.   The rhythm is sinus.      Signature      ----------------------------------------------------------------

## 2024-06-02 ENCOUNTER — APPOINTMENT (OUTPATIENT)
Dept: ULTRASOUND IMAGING | Age: 54
DRG: 683 | End: 2024-06-02
Payer: COMMERCIAL

## 2024-06-02 LAB
25(OH)D3 SERPL-MCNC: 21.2 NG/ML
ANION GAP SERPL CALCULATED.3IONS-SCNC: 11 MMOL/L (ref 7–19)
BILIRUB UR QL STRIP: NEGATIVE
BUN SERPL-MCNC: 25 MG/DL (ref 6–20)
CALCIUM SERPL-MCNC: 8.5 MG/DL (ref 8.6–10)
CHLORIDE SERPL-SCNC: 99 MMOL/L (ref 98–111)
CHOLEST SERPL-MCNC: 119 MG/DL (ref 160–199)
CLARITY UR: CLEAR
CO2 SERPL-SCNC: 25 MMOL/L (ref 22–29)
COLOR UR: YELLOW
CREAT SERPL-MCNC: 1.8 MG/DL (ref 0.5–1.2)
CREAT UR-MCNC: 139.6 MG/DL (ref 39–259)
ERYTHROCYTE [DISTWIDTH] IN BLOOD BY AUTOMATED COUNT: 12.7 % (ref 11.5–14.5)
GLUCOSE BLD-MCNC: 107 MG/DL (ref 70–99)
GLUCOSE BLD-MCNC: 141 MG/DL (ref 70–99)
GLUCOSE SERPL-MCNC: 143 MG/DL (ref 74–109)
GLUCOSE UR STRIP.AUTO-MCNC: NEGATIVE MG/DL
HCT VFR BLD AUTO: 33.9 % (ref 42–52)
HDLC SERPL-MCNC: 19 MG/DL (ref 55–121)
HGB BLD-MCNC: 10.7 G/DL (ref 14–18)
HGB UR STRIP.AUTO-MCNC: NEGATIVE MG/L
KETONES UR STRIP.AUTO-MCNC: 40 MG/DL
LDLC SERPL CALC-MCNC: 67 MG/DL
LEUKOCYTE ESTERASE UR QL STRIP.AUTO: NEGATIVE
MCH RBC QN AUTO: 28.2 PG (ref 27–31)
MCHC RBC AUTO-ENTMCNC: 31.6 G/DL (ref 33–37)
MCV RBC AUTO: 89.4 FL (ref 80–94)
MICROALBUMIN UR-MCNC: <1.2 MG/DL (ref 0–19)
MICROALBUMIN/CREAT UR-RTO: NORMAL MG/G
NITRITE UR QL STRIP.AUTO: NEGATIVE
OSMOLALITY URINE: 493 MOSM/KG (ref 250–1200)
PERFORMED ON: ABNORMAL
PERFORMED ON: ABNORMAL
PH UR STRIP.AUTO: 6.5 [PH] (ref 5–8)
PLATELET # BLD AUTO: 146 K/UL (ref 130–400)
PMV BLD AUTO: 11.4 FL (ref 9.4–12.4)
POTASSIUM SERPL-SCNC: 4.3 MMOL/L (ref 3.5–5)
PROT UR STRIP.AUTO-MCNC: ABNORMAL MG/DL
PTH-INTACT SERPL-MCNC: 28 PG/ML (ref 15–65)
RBC # BLD AUTO: 3.79 M/UL (ref 4.7–6.1)
SARS-COV-2 RDRP RESP QL NAA+PROBE: NOT DETECTED
SODIUM SERPL-SCNC: 135 MMOL/L (ref 136–145)
SODIUM UR-SCNC: 82 MMOL/L
SP GR UR STRIP.AUTO: 1.02 (ref 1–1.03)
TRIGL SERPL-MCNC: 164 MG/DL (ref 0–149)
TROPONIN, HIGH SENSITIVITY: 40 NG/L (ref 0–22)
TROPONIN, HIGH SENSITIVITY: 42 NG/L (ref 0–22)
TROPONIN, HIGH SENSITIVITY: 46 NG/L (ref 0–22)
TROPONIN, HIGH SENSITIVITY: 47 NG/L (ref 0–22)
TROPONIN, HIGH SENSITIVITY: 54 NG/L (ref 0–22)
UROBILINOGEN UR STRIP.AUTO-MCNC: 1 E.U./DL
WBC # BLD AUTO: 7.1 K/UL (ref 4.8–10.8)

## 2024-06-02 PROCEDURE — 83970 ASSAY OF PARATHORMONE: CPT

## 2024-06-02 PROCEDURE — 82043 UR ALBUMIN QUANTITATIVE: CPT

## 2024-06-02 PROCEDURE — 80061 LIPID PANEL: CPT

## 2024-06-02 PROCEDURE — 87205 SMEAR GRAM STAIN: CPT

## 2024-06-02 PROCEDURE — 36415 COLL VENOUS BLD VENIPUNCTURE: CPT

## 2024-06-02 PROCEDURE — 87635 SARS-COV-2 COVID-19 AMP PRB: CPT

## 2024-06-02 PROCEDURE — G0480 DRUG TEST DEF 1-7 CLASSES: HCPCS

## 2024-06-02 PROCEDURE — 2580000003 HC RX 258: Performed by: NURSE PRACTITIONER

## 2024-06-02 PROCEDURE — 82306 VITAMIN D 25 HYDROXY: CPT

## 2024-06-02 PROCEDURE — 85027 COMPLETE CBC AUTOMATED: CPT

## 2024-06-02 PROCEDURE — 82962 GLUCOSE BLOOD TEST: CPT

## 2024-06-02 PROCEDURE — 81001 URINALYSIS AUTO W/SCOPE: CPT

## 2024-06-02 PROCEDURE — 2580000003 HC RX 258: Performed by: INTERNAL MEDICINE

## 2024-06-02 PROCEDURE — 84300 ASSAY OF URINE SODIUM: CPT

## 2024-06-02 PROCEDURE — 82570 ASSAY OF URINE CREATININE: CPT

## 2024-06-02 PROCEDURE — 99254 IP/OBS CNSLTJ NEW/EST MOD 60: CPT | Performed by: PSYCHIATRY & NEUROLOGY

## 2024-06-02 PROCEDURE — 84484 ASSAY OF TROPONIN QUANT: CPT

## 2024-06-02 PROCEDURE — 80048 BASIC METABOLIC PNL TOTAL CA: CPT

## 2024-06-02 PROCEDURE — 87169 MACROSCOPIC EXAM PARASITE: CPT

## 2024-06-02 PROCEDURE — 80307 DRUG TEST PRSMV CHEM ANLYZR: CPT

## 2024-06-02 PROCEDURE — 81003 URINALYSIS AUTO W/O SCOPE: CPT

## 2024-06-02 PROCEDURE — 93005 ELECTROCARDIOGRAM TRACING: CPT | Performed by: NURSE PRACTITIONER

## 2024-06-02 PROCEDURE — 1200000000 HC SEMI PRIVATE

## 2024-06-02 PROCEDURE — 6370000000 HC RX 637 (ALT 250 FOR IP): Performed by: NURSE PRACTITIONER

## 2024-06-02 PROCEDURE — 76770 US EXAM ABDO BACK WALL COMP: CPT

## 2024-06-02 PROCEDURE — 83935 ASSAY OF URINE OSMOLALITY: CPT

## 2024-06-02 RX ORDER — SODIUM CHLORIDE 9 MG/ML
INJECTION, SOLUTION INTRAVENOUS CONTINUOUS
Status: DISCONTINUED | OUTPATIENT
Start: 2024-06-02 | End: 2024-06-02

## 2024-06-02 RX ADMIN — ATORVASTATIN CALCIUM 40 MG: 40 TABLET, FILM COATED ORAL at 19:40

## 2024-06-02 RX ADMIN — INSULIN GLARGINE 20 UNITS: 100 INJECTION, SOLUTION SUBCUTANEOUS at 19:40

## 2024-06-02 RX ADMIN — SODIUM CHLORIDE, PRESERVATIVE FREE 10 ML: 5 INJECTION INTRAVENOUS at 01:09

## 2024-06-02 RX ADMIN — APIXABAN 5 MG: 5 TABLET, FILM COATED ORAL at 19:40

## 2024-06-02 RX ADMIN — APIXABAN 5 MG: 5 TABLET, FILM COATED ORAL at 07:58

## 2024-06-02 RX ADMIN — SODIUM CHLORIDE: 9 INJECTION, SOLUTION INTRAVENOUS at 14:31

## 2024-06-02 NOTE — PLAN OF CARE
Problem: Discharge Planning  Goal: Discharge to home or other facility with appropriate resources  Outcome: Progressing  Flowsheets (Taken 6/1/2024 5498)  Discharge to home or other facility with appropriate resources: Identify barriers to discharge with patient and caregiver

## 2024-06-02 NOTE — ED NOTES
This nurse attempted to call pt girlfriend, marilee. Was able to talk for a minute before getting disconnected. Will try again later.

## 2024-06-02 NOTE — ED NOTES
Pt reports wanting to leave because he doesn't know what is going on. This nurse reminded him of admission. He states he hasn't seen the doctor. Nurse reported that Xavier has been down to see him. He states, no he didn't. This nurse told pt that he had been told about the admission 3 times now. Pt became belligerent and raised his voice at nurse that she was lying. He also complained that the medication she gave earlier wasn't working, and that she fucked up and didn't want to own it. Nurse ended conversation by stating she did not have to listen to him berate her and left the room.

## 2024-06-02 NOTE — ED NOTES
ED TO INPATIENT SBAR HANDOFF    Patient Name: King Reeves   : 1970  53 y.o.   Family/Caregiver Present: No  Code Status Order: Full Code    C-SSRS: Risk of Suicide: High Risk  Sitter Yes  Restraints:         Situation  Chief Complaint:   Chief Complaint   Patient presents with    Chest Pain     Chest pain, SOA, and left arm pain.        Patient Diagnosis: Suicidal ideation [R45.502]     Brief Description of Patient's Condition: pt original complaint was chest pain and SOB after getting into an argument with his girlfriend. Upon arrival and triage pt admits to being depressed and wanting to shoot himself. Pt placed on suicide precautions w/sitter at side.  Pt is followed by [facility] for  renal carcinoma and is to have a scan completed in two weeks. Pt has had a sudden mood change within the last week per girlfriend. Pt has been uncooperative, angry, and forgetful the majority of the stay in this ED. Pt was given 2mg of Ativan and 1L of LR. Pt was evidently seen by vascular yesterday and had to be escorted off the premises due to his disruptive behavior. Pt is a diabetic and is apparently compliant with all medications.   Mental Status: disoriented and alert  Arrived from: home    Imaging:   CT HEAD WO CONTRAST   Final Result   No acute intracranial findings.        All CT scans are performed using dose optimization techniques as appropriate to the performed exam and include    at least one of the following: Automated exposure control, adjustment of the mA and/or kV according to size, and the use of iterative reconstruction technique.        ______________________________________    Electronically signed by: KING MEREDITH M.D.   Date:     2024   Time:    20:15       XR CHEST PORTABLE   Final Result      US RENAL COMPLETE    (Results Pending)     COVID-19 Results:   Internal Administration   First Dose      Second Dose           Last COVID Lab SARS-CoV-2, PCR (no units)   Date Value   2022 Not Detected

## 2024-06-02 NOTE — ED NOTES
This nurse spoke with pt girlfriend, Angela. Pt verbalized approval to talk about medical dx and tx. Angela reports pt has not been himself over the last week. He gets agitated quickly, impulsive, and has become forgetful. She has been with him for 6yrs and he has never once raised his voice to her or anyone else, let alone argue or cuss. Angela said that he is being followed by [facility] and that he has had scans of his abdomen completed (another one in two weeks) but none of his head. This nurse informed Dr. Tierney.

## 2024-06-02 NOTE — CONSULTS
[facility]  Psychiatry Consult    Reason for Consult: Concern   Suicidal ideations    The primary source(s) of information include(s):  patient    The patient is a 53 y.o. male with no reported previous psychiatric history, complicated by history of multiple medical conditions, including renal cell carcinoma, diabetes, hypertension, hyperlipidemia, history of DVT, who was admitted to medical services secondary to chest pain.    It was reported that the patient endorsed suicidal ideations and psychiatric consult has been placed.    Patient has been seen in his room with presence of one-to-one sitter.  Patient stated that he was diagnosed with renal cell carcinoma and recently started experiencing \"the same symptoms, as in the past-nausea, vomiting, constipation\" and started feeling \"frustrated\".  He reported that he was experiencing nausea, vomiting and chest pain and decided to come to the hospital for evaluation.  Patient denies any chest pain today during the interview.    In regards of affective symptomatology, patient denies feeling of depression and anxiety, stated that he feels \"sad\", as he is dealing with his physical health issues.  He denies any mood swings or racing thoughts.  Patient endorses feeling of helplessness, denies feeling of hopelessness and worthlessness.  He denies current active suicidal and homicidal ideations, denies any plans.  Also patient denies any lesions.  He did not endorse any delusions or paranoid thoughts.      PSYCHIATRIC HISTORY:  Diagnoses: Denies  Suicide attempts/gestures: Denies   Prior hospitalizations: Denies   Medication trials: Gabapentin for peripheral neuropathic pain with no significant effect, currently on Lyrica  Mental health contact: Primary care provider manages patient's psychotropic medications  Head trauma: Denies    Allergies:  Patient has no known allergies.    Mental Status  Appearance: Appropriately groomed and in hospital attire. 
[facility]  Cardiology Consult      Requesting MD:  Alvin Peralta MD   Admit Status:         History obtained from:   [x] Patient  [] Other (specify):     Reason for consultation :  chest pain       PRESENTATION: Fracisco Reeves is a 53 y.o. year old male  with DM type 2 , HTN , Renal cell cancer s/p nephrectomy, LI and morbid obesity with BMI of 35, DVT on eliquis, admitted with suicidal ideation and after arguing with his girlfriend he was also having some chest pain which resolved after he stopped arguing.  Patient denies any radiation.  Nonexertional.  Patient has no prior history of CAD or bypass surgery.  Patient is compliant with his medications      REVIEW OF SYSTEMS:  As per HPI, all other 12 systems reviewed and negative     Past Medical History:      Diagnosis Date    Anxiety     Cancer (HCC) 08/2023    Clear cell Renal cell carcinoma    Clotting disorder (HCC)     Family history Factor V Leiden    Depression     DVT of lower extremity, bilateral (HCC) 08/2023    Intervertebral lumbar disc disorder with myelopathy, lumbar region     Neuropathy     Obstructive sleep apnea syndrome     Panic attacks     Primary hypertension     Type 2 diabetes mellitus without complication, with long-term current use of insulin (HCC)        Past Surgical History:      Procedure Laterality Date    COLONOSCOPY N/A 03/25/2022    Dr Connor, AT (x5) W Dysplasia, TVA  (-)Dysplasia, int hemorrhoids Gr 2 wo bleeding, sub prep fair, 1 year recall    TOTAL NEPHRECTOMY Right 08/22/2023    with IBC thrombectomy- [facility]       Allergies:  Patient has no known allergies.    Past Social History:  Social History     Socioeconomic History    Marital status: Single     Spouse name: Not on file    Number of children: Not on file    Years of education: Not on file    Highest education level: Not on file   Occupational History    Not on file   Tobacco Use    Smoking status: Former     Current 
COMPARISON: None.  FINDINGS:  Right Kidney: - Absent.  Left Kidney: - Renal measurement: 10.4 cm. - Parenchyma: Normal echogenicity.  Normal parenchymal thickness. - Collecting system: No hydronephrosis. - Calculus: None. - Lesion: None.  Bladder: Poorly distended.      1.  Right nephrectomy. 2.  Normal sonographic appearance of the left kidney. 3.  Poor distension of the bladder, limiting evaluation.  No obvious abnormalities.   ______________________________________ Electronically signed by: JULIAN CAMPA M.D. Date:     06/02/2024 Time:    10:14     CT HEAD WO CONTRAST    Result Date: 6/1/2024  EXAM: CT HEAD WITHOUT CONTRAST  DATE: 06/01/2024  COMPARISON: 09/11/2023  HISTORY: History of renal cell cancer.  1-week history of behavioral change.  TECHNIQUE:  A helical scan of the brain was performed without contrast.  FINDINGS:  The calvarium is intact.The paranasal sinuses and mastoid air cells are clear.  The brainstem and cerebellum are within normal limits.  No abnormal intra or extra-axial fluid, mass or mass effect is present.  There is no midline shift or hydrocephalus.  No large vessel infarct or hemorrhage is observed.  The gray-white interface is maintained.      No acute intracranial findings.  All CT scans are performed using dose optimization techniques as appropriate to the performed exam and include at least one of the following: Automated exposure control, adjustment of the mA and/or kV according to size, and the use of iterative reconstruction technique.  ______________________________________ Electronically signed by: KING MEREDITH M.D. Date:     06/01/2024 Time:    20:15     XR CHEST PORTABLE    Result Date: 6/1/2024  EXAM: SINGLE VIEW CHEST XRAY.  Date: 06/01/2024  Comparison: None  History: Chest pain  Findings: There is a lesser inspiration. No acute osseous abnormality.  The lungs are clear . The cardiac silhouette is within normal limits.  The pulmonary vasculature is within normal limits.

## 2024-06-03 VITALS
HEIGHT: 72 IN | DIASTOLIC BLOOD PRESSURE: 80 MMHG | HEART RATE: 87 BPM | BODY MASS INDEX: 34.98 KG/M2 | SYSTOLIC BLOOD PRESSURE: 119 MMHG | OXYGEN SATURATION: 96 % | RESPIRATION RATE: 20 BRPM | WEIGHT: 258.3 LBS | TEMPERATURE: 97.9 F

## 2024-06-03 LAB
ALBUMIN SERPL-MCNC: 3.6 G/DL (ref 3.5–5.2)
ALP SERPL-CCNC: 48 U/L (ref 40–130)
ALT SERPL-CCNC: 6 U/L (ref 5–41)
ANION GAP SERPL CALCULATED.3IONS-SCNC: 14 MMOL/L (ref 7–19)
AST SERPL-CCNC: 11 U/L (ref 5–40)
BILIRUB SERPL-MCNC: 1.4 MG/DL (ref 0.2–1.2)
BUN SERPL-MCNC: 21 MG/DL (ref 6–20)
CALCIUM SERPL-MCNC: 8.8 MG/DL (ref 8.6–10)
CHLORIDE SERPL-SCNC: 99 MMOL/L (ref 98–111)
CO2 SERPL-SCNC: 23 MMOL/L (ref 22–29)
CREAT SERPL-MCNC: 1.3 MG/DL (ref 0.5–1.2)
EKG P AXIS: 85 DEGREES
EKG P-R INTERVAL: 122 MS
EKG Q-T INTERVAL: 402 MS
EKG QRS DURATION: 96 MS
EKG QTC CALCULATION (BAZETT): 451 MS
EKG T AXIS: 52 DEGREES
ERYTHROCYTE [DISTWIDTH] IN BLOOD BY AUTOMATED COUNT: 12.4 % (ref 11.5–14.5)
GLUCOSE BLD-MCNC: 110 MG/DL (ref 70–99)
GLUCOSE SERPL-MCNC: 126 MG/DL (ref 74–109)
HCT VFR BLD AUTO: 35.9 % (ref 42–52)
HGB BLD-MCNC: 11 G/DL (ref 14–18)
MCH RBC QN AUTO: 28.3 PG (ref 27–31)
MCHC RBC AUTO-ENTMCNC: 30.6 G/DL (ref 33–37)
MCV RBC AUTO: 92.3 FL (ref 80–94)
PERFORMED ON: ABNORMAL
PLATELET # BLD AUTO: 150 K/UL (ref 130–400)
PMV BLD AUTO: 11.1 FL (ref 9.4–12.4)
POTASSIUM SERPL-SCNC: 4.1 MMOL/L (ref 3.5–5)
PROT SERPL-MCNC: 5.6 G/DL (ref 6.6–8.7)
RBC # BLD AUTO: 3.89 M/UL (ref 4.7–6.1)
SODIUM SERPL-SCNC: 136 MMOL/L (ref 136–145)
WBC # BLD AUTO: 6 K/UL (ref 4.8–10.8)

## 2024-06-03 PROCEDURE — 36415 COLL VENOUS BLD VENIPUNCTURE: CPT

## 2024-06-03 PROCEDURE — 6370000000 HC RX 637 (ALT 250 FOR IP): Performed by: NURSE PRACTITIONER

## 2024-06-03 PROCEDURE — 80053 COMPREHEN METABOLIC PANEL: CPT

## 2024-06-03 PROCEDURE — 82962 GLUCOSE BLOOD TEST: CPT

## 2024-06-03 PROCEDURE — 85027 COMPLETE CBC AUTOMATED: CPT

## 2024-06-03 RX ADMIN — APIXABAN 5 MG: 5 TABLET, FILM COATED ORAL at 08:08

## 2024-06-03 NOTE — DISCHARGE SUMMARY
[address] [place] [address]  [facility]    DISCHARGE SUMMARY:        Fracisco Reeves  :  1970  MRN:  [id number]    Admit date:  2024  Discharge date:  6/3/2024      Admitting Physician:  Alvin Peralta MD    Advance Directive: Full Code    Consults Made:   IP CONSULT TO CARDIOLOGY  IP CONSULT TO PSYCHIATRY  IP CONSULT TO NEPHROLOGY      Primary Care Physician:  Preston Dexter MD    Discharge Diagnoses:  Principal Problem:    Suicidal ideation  Active Problems:    Type 2 diabetes mellitus without complication, with long-term current use of insulin (HCC)    JO (acute kidney injury) (HCC)    Chest pain  Resolved Problems:    * No resolved hospital problems. *          Pertinent Labs:  CBC with DIFF:  Recent Labs     24  0150   WBC 10.5 7.1 6.0   RBC 4.10* 3.79* 3.89*   HGB 11.7* 10.7* 11.0*   HCT 35.4* 33.9* 35.9*   MCV 86.3 89.4 92.3   MCH 28.5 28.2 28.3   MCHC 33.1 31.6* 30.6*   RDW 12.6 12.7 12.4    146 150   MPV 10.5 11.4 11.1   NEUTOPHILPCT 66.6*  --   --    LYMPHOPCT 17.4*  --   --    MONOPCT 9.6  --   --    BASOPCT 0.8  --   --    NEUTROABS 7.0  --   --    MONOSABS 1.00*  --   --    EOSABS 0.60  --   --    BASOSABS 0.10  --   --        CMP/BMP:  Recent Labs     24  0150   * 135* 136   K 4.4 4.3 4.1   CL 97* 99 99   CO2 23 25 23   ANIONGAP 15 11 14   GLUCOSE 140* 143* 126*   BUN 25* 25* 21*   CREATININE 2.0* 1.8* 1.3*   LABGLOM 38* 44* 65   CALCIUM 9.1 8.5* 8.8   BILITOT 1.7*  --  1.4*   ALKPHOS 51  --  48   ALT 9  --  6   AST 16  --  11         CRP:  No results for input(s): \"CRP\" in the last 72 hours.  Sed Rate:  No results for input(s): \"SEDRATE\" in the last 72 hours.      HgBA1c:  No components found for: \"HGBA1C\"  FLP:    Lab Results   Component Value Date/Time    TRIG 164 2024 02:09 AM    HDL 19 2024 02:09 AM     TSH:    Lab Results   Component Value Date/Time

## 2024-06-03 NOTE — DISCHARGE INSTR - DIET
Good nutrition is important when healing from an illness, injury, or surgery.  Follow any nutrition recommendations given to you during your hospital stay.   If you were given an oral nutrition supplement while in the hospital, continue to take this supplement at home.  You can take it with meals, in-between meals, and/or before bedtime. These supplements can be purchased at most local grocery stores, pharmacies, and chain [employer]-stores.   If you have any questions about your diet or nutrition, call the hospital and ask for the dietitian.  ADULT DIET; Regular; 3 carb choices (45 gm/meal)

## 2024-06-04 ENCOUNTER — TELEPHONE (OUTPATIENT)
Dept: FAMILY MEDICINE CLINIC | Age: 54
End: 2024-06-04

## 2024-06-04 LAB — DIRECT EXAM: NORMAL

## 2024-06-04 NOTE — TELEPHONE ENCOUNTER
Care Transitions Initial Follow Up Call    Outreach made within 2 business days of discharge: Yes    Patient: Fracisco Reeves Patient : 1970   MRN: [id number]    Reason for Admission:  Principal Problem:    Suicidal ideation  Active Problems:    Type 2 diabetes mellitus without complication, with long-term current use of insulin (HCC)    JO (acute kidney injury) (HCC)    Chest pain    Discharge Date: 6/3/24       Spoke with: Patient    Discharge department/facility: [facility] Interactive Patient Contact:  Was patient able to fill all prescriptions: Yes  Was patient instructed to bring all medications to the follow-up visit:   Is patient taking all medications as directed in the discharge summary? Yes  Does patient understand their discharge instructions: Yes  Does patient have questions or concerns that need addressed prior to 7-14 day follow up office visit: yes - Pt has complaints in to administration at [facility]. He said that \"they treated him like absolute shit.\"  He is requesting a referral to Vascular at [facility].   Pt states that he will be changing his providers other than Dr Dexter and Dr Navarro. He is wanting a new kidney dr, a new vascular dr,     Pt doesn't want to come in for a follow up visit. If Dr Dexter can do a VV for this he will agree to see him for a hospital follow up but he does not want to come in to the office.   Spoke with Rosy SUE, Dr Dexter nurse and asked that she reach out to pt regarding the hospital follow up and if it can be done vv as he does not want to come into the office. I also mentioned that I was concerned with his demeanor on the call. She will reach out to pt.     Scheduled appointment with PCP within 7-14 days    Follow Up  Future Appointments   Date Time Provider Department Center   2024 11:30 AM Preston Dexter MD [facility]   2024  1:00 PM [facility] CT RM 1 [facility]   2024  1:40 PM [facility] CT RM 1 [facility]

## 2024-06-05 ENCOUNTER — TELEPHONE (OUTPATIENT)
Dept: VASCULAR SURGERY | Facility: CLINIC | Age: 54
End: 2024-06-05
Payer: COMMERCIAL

## 2024-06-06 NOTE — PROGRESS NOTES
Automatic Dose Adjustment of                Subcutaneous Anticoagulant for Prophylaxis    Fracisco Reeves is a 53 y.o. male.     Recent Labs     06/01/24  1733   CREATININE 2.0*       Estimated Creatinine Clearance: 56 mL/min (A) (based on SCr of 2 mg/dL (H)).    Weight:  Wt Readings from Last 1 Encounters:   06/01/24 122.5 kg (270 lb)           Pharmacy has adjusted subcutaneous anticoagulant for prophylaxis to Enoxaparin 30 mg SC twice daily based on the patient's weight and estimated CrCl per [facility] policy.               Electronically signed by Mimi Erickson RPH on 6/1/2024 at 6:28 PM      
      [facility] Progress Note    Patient:  Fracisco Reeves  YOB: 1970  Date of Service: 6/2/2024  MRN: [id number]   Acct: [id number]   Primary Care Physician: Preston Dexter MD  Advance Directive: Full Code  Admit Date: 6/1/2024       Hospital Day: 1    .    CHIEF COMPLAINT:     Chief Complaint   Patient presents with    Chest Pain     Chest pain, SOA, and left arm pain.          6/2/2024 7:35 AM  Subjective / Interval History:   06/02/2024  Patient seen and examined.  Doing well.  No new complaints.  No acute changes or acute overnight event reported.  Laying comfortably in bed in no apparent acute distress.  Denies any acute complaints or distress at this time.    One-to-one sitter at bedside with safety in AM.          Review of Systems:   Review of Systems  ROS: 14 point review of systems is negative except as specifically addressed above.    Diet NPO    Intake/Output Summary (Last 24 hours) at 6/2/2024 0735  Last data filed at 6/2/2024 0222  Gross per 24 hour   Intake --   Output 500 ml   Net -500 ml       Medications:   sodium chloride      sodium chloride      dextrose       Current Facility-Administered Medications   Medication Dose Route Frequency Provider Last Rate Last Admin    0.9 % sodium chloride infusion   IntraVENous Continuous Xavier Dolan APRN - CNP        sodium chloride flush 0.9 % injection 5-40 mL  5-40 mL IntraVENous 2 times per day Xavier Dolan APRN - CNP   10 mL at 06/02/24 0109    sodium chloride flush 0.9 % injection 5-40 mL  5-40 mL IntraVENous PRN Xavier Dolan APRN - CNP        0.9 % sodium chloride infusion   IntraVENous PRN Xavier Dolan APRN - CNP        potassium chloride (KLOR-CON M) extended release tablet 40 mEq  40 mEq Oral PRN Xavier Dolan APRN - CNP        Or    potassium bicarb-citric acid (EFFER-K) effervescent tablet 40 mEq  40 mEq Oral PRN Xavier Dolan APRN - CNP        Or    potassium chloride 10 mEq/100 mL IVPB (Peripheral Line)  10 mEq 
  Physician Progress Note      PATIENT:               KING HILARIO  CSN #:                  [id number]  :                       1970  ADMIT DATE:       2024 4:18 PM  DISCH DATE:        6/3/2024 1:15 PM  RESPONDING  PROVIDER #:        Alvin Gonzalez MPH MD MPH          QUERY TEXT:    Patient admitted with suicidal ideation after arguing with his girlfriend and   had chest pain which resolved after arguing. Noted documentation of NSTEMI  in   discharge summary. In order to support the diagnosis of NSTEMI please include   additional clinical indicators in your documentation.  Or please document if   the diagnosis of NSTEMI has been ruled out after further study.    The medical record reflects the following:  Risk Factors: DMII, HTN, Renal cell cancer, LI, Morbid obesity  Clinical Indicators: Suicidal after arguing with his girlfriend, chest pain   resolved after he stopped arguing. Troponins 45, 54, 40  Cardiology consult note \" Troponins trending down, related to uncontrolled   hypertension from argument stress and anxiety\".  Treatment:   ml/hr, Ativan 2 mg IV x 1, 1 liter bolus LR. Labs    Thank you  Jennifer Jordan RN, BSN, [facility]  [phone]  Options provided:  -- NSTEMI  present as evidenced by, Please document evidence.  -- NSTEMI  was ruled out  -- Other - I will add my own diagnosis  -- Disagree - Not applicable / Not valid  -- Disagree - Clinically unable to determine / Unknown  -- Refer to Clinical Documentation Reviewer    PROVIDER RESPONSE TEXT:    NSTEMI was ruled out after study.    Query created by: Jennifer Jordan on 2024 9:54 AM      Electronically signed by:  Alvin Gonzalez MPH MD MPH 2024 8:38   AM          
24 hour orders verified.  
4 Eyes Skin Assessment     NAME:  Fracisco Reeves  YOB: 1970  MEDICAL RECORD NUMBER:  [id number]    The patient is being assessed for  Admission    I agree that at least one RN has performed a thorough Head to Toe Skin Assessment on the patient. ALL assessment sites listed below have been assessed.      Areas assessed by both nurses:    Sacrum. Buttock, Coccyx, Ischium        Does the Patient have a Wound? No noted wound(s)       Patrick Prevention initiated by RN: No  Wound Care Orders initiated by RN: No    Pressure Injury (Stage 3,4, Unstageable, DTI, NWPT, and Complex wounds) if present, place Wound referral order by RN under : No    New Ostomies, if present place, Ostomy referral order under : No     Nurse 1 eSignature: Electronically signed by Lorrie Pope RN on 6/2/24 at 6:59 AM CDT    **SHARE this note so that the co-signing nurse can place an eSignature**    Nurse 2 eSignature: {Esignature:[id number]}   
72 hour hold document placed in patient's chart bin.  
Iv occluded, removed with angiocath intact.  Pressure held to site and bandaid applied.  4 attempts made by 2 staff without success. Pt refusing any more iv attempts until am when staff with ultrasound available.  Notified hospitalist of pt refusal.  
Iv placed by JONNA Reeves RN with use of vein finder.    
Patient has been agitated since arrival to unit.  Refuses to answer questions, refusing to have labs drawn, angry, wants another room, doesn't want the sitter, wants his girlfriend, is going to call the  and he's cold.        
Pt becoming very agitated, wanting to leave. Spoke with Dr. Peralta about pt wanting to leave, he said pt will be discharged today once cardiology and nephrology clear him for discharge. Spoke with Dr. Ball, he ok with pt discharging. Waiting for Dr. Napier to see pt. Explained to pt what we are waiting for, and advised pt against leaving AMA. Pt calm and understanding for now. Will continue to monitor.       Spoke with Dr. Karthikeyan Hamilton's nurse, he is okay for dc. Notified Dr. Peralta.   
last 72 hours.  Sed Rate:  No results for input(s): \"SEDRATE\" in the last 72 hours.    Culture Results:   Blood Culture Recent: No results for input(s): \"BC\" in the last 720 hours.    Urine Culture Recent : No results for input(s): \"LABURIN\" in the last 720 hours.    Radiology reports as per the Radiologist  Radiology: US RENAL COMPLETE    Result Date: 6/2/2024  EXAM: RENAL ULTRASOUND  HISTORY: Acute kidney injury.  Right nephrectomy.  TECHNIQUE:  Sonography of the kidneys and urinary bladder was performed.  Images were obtained and stored in a permanent archive.  COMPARISON: None.  FINDINGS:  Right Kidney: - Absent.  Left Kidney: - Renal measurement: 10.4 cm. - Parenchyma: Normal echogenicity.  Normal parenchymal thickness. - Collecting system: No hydronephrosis. - Calculus: None. - Lesion: None.  Bladder: Poorly distended.      1.  Right nephrectomy. 2.  Normal sonographic appearance of the left kidney. 3.  Poor distension of the bladder, limiting evaluation.  No obvious abnormalities.   ______________________________________ Electronically signed by: JULIAN CAMPA M.D. Date:     06/02/2024 Time:    10:14     CT HEAD WO CONTRAST    Result Date: 6/1/2024  EXAM: CT HEAD WITHOUT CONTRAST  DATE: 06/01/2024  COMPARISON: 09/11/2023  HISTORY: History of renal cell cancer.  1-week history of behavioral change.  TECHNIQUE:  A helical scan of the brain was performed without contrast.  FINDINGS:  The calvarium is intact.The paranasal sinuses and mastoid air cells are clear.  The brainstem and cerebellum are within normal limits.  No abnormal intra or extra-axial fluid, mass or mass effect is present.  There is no midline shift or hydrocephalus.  No large vessel infarct or hemorrhage is observed.  The gray-white interface is maintained.      No acute intracranial findings.  All CT scans are performed using dose optimization techniques as appropriate to the performed exam and include at least one of the following: Automated

## 2024-06-07 ENCOUNTER — TELEPHONE (OUTPATIENT)
Dept: VASCULAR SURGERY | Facility: CLINIC | Age: 54
End: 2024-06-07
Payer: COMMERCIAL

## 2024-06-07 NOTE — TELEPHONE ENCOUNTER
Call placed to patient and reminded of appointment on 6/10/2024 at 1415, patient verbalized understanding.

## 2024-06-09 ENCOUNTER — HOSPITAL ENCOUNTER (EMERGENCY)
Facility: HOSPITAL | Age: 54
Discharge: HOME | End: 2024-06-10
Attending: EMERGENCY MEDICINE | Admitting: EMERGENCY MEDICINE
Payer: COMMERCIAL

## 2024-06-09 DIAGNOSIS — R19.7 DIARRHEA, UNSPECIFIED TYPE: Primary | ICD-10-CM

## 2024-06-09 LAB
HOLD SPECIMEN: NORMAL
HOLD SPECIMEN: NORMAL
WHOLE BLOOD HOLD COAG: NORMAL
WHOLE BLOOD HOLD SPECIMEN: NORMAL

## 2024-06-09 PROCEDURE — 80053 COMPREHEN METABOLIC PANEL: CPT | Performed by: EMERGENCY MEDICINE

## 2024-06-09 PROCEDURE — 99283 EMERGENCY DEPT VISIT LOW MDM: CPT

## 2024-06-09 PROCEDURE — 83735 ASSAY OF MAGNESIUM: CPT | Performed by: EMERGENCY MEDICINE

## 2024-06-09 PROCEDURE — 84484 ASSAY OF TROPONIN QUANT: CPT | Performed by: EMERGENCY MEDICINE

## 2024-06-09 PROCEDURE — 82550 ASSAY OF CK (CPK): CPT | Performed by: EMERGENCY MEDICINE

## 2024-06-09 PROCEDURE — 85025 COMPLETE CBC W/AUTO DIFF WBC: CPT | Performed by: EMERGENCY MEDICINE

## 2024-06-10 ENCOUNTER — TELEPHONE (OUTPATIENT)
Dept: HEMATOLOGY | Age: 54
End: 2024-06-10

## 2024-06-10 VITALS
BODY MASS INDEX: 34 KG/M2 | HEIGHT: 72 IN | DIASTOLIC BLOOD PRESSURE: 79 MMHG | SYSTOLIC BLOOD PRESSURE: 115 MMHG | RESPIRATION RATE: 18 BRPM | TEMPERATURE: 98 F | HEART RATE: 79 BPM | WEIGHT: 251 LBS | OXYGEN SATURATION: 100 %

## 2024-06-10 LAB
ALBUMIN SERPL-MCNC: 4 G/DL (ref 3.5–5.2)
ALBUMIN/GLOB SERPL: 1.5 G/DL
ALP SERPL-CCNC: 53 U/L (ref 39–117)
ALT SERPL W P-5'-P-CCNC: 9 U/L (ref 1–41)
ANION GAP SERPL CALCULATED.3IONS-SCNC: 14 MMOL/L (ref 5–15)
AST SERPL-CCNC: 18 U/L (ref 1–40)
B PARAPERT DNA SPEC QL NAA+PROBE: NOT DETECTED
B PERT DNA SPEC QL NAA+PROBE: NOT DETECTED
BASOPHILS # BLD AUTO: 0.07 10*3/MM3 (ref 0–0.2)
BASOPHILS NFR BLD AUTO: 0.8 % (ref 0–1.5)
BILIRUB SERPL-MCNC: 1.6 MG/DL (ref 0–1.2)
BUN SERPL-MCNC: 23 MG/DL (ref 6–20)
BUN/CREAT SERPL: 14.4 (ref 7–25)
C PNEUM DNA NPH QL NAA+NON-PROBE: NOT DETECTED
CALCIUM SPEC-SCNC: 9.5 MG/DL (ref 8.6–10.5)
CHLORIDE SERPL-SCNC: 90 MMOL/L (ref 98–107)
CK SERPL-CCNC: 82 U/L (ref 20–200)
CO2 SERPL-SCNC: 25 MMOL/L (ref 22–29)
CREAT SERPL-MCNC: 1.6 MG/DL (ref 0.76–1.27)
DEPRECATED RDW RBC AUTO: 39.1 FL (ref 37–54)
EGFRCR SERPLBLD CKD-EPI 2021: 50.6 ML/MIN/1.73
EOSINOPHIL # BLD AUTO: 0.46 10*3/MM3 (ref 0–0.4)
EOSINOPHIL NFR BLD AUTO: 5.1 % (ref 0.3–6.2)
ERYTHROCYTE [DISTWIDTH] IN BLOOD BY AUTOMATED COUNT: 12.9 % (ref 12.3–15.4)
FLUAV SUBTYP SPEC NAA+PROBE: NOT DETECTED
FLUBV RNA ISLT QL NAA+PROBE: NOT DETECTED
GLOBULIN UR ELPH-MCNC: 2.6 GM/DL
GLUCOSE SERPL-MCNC: 126 MG/DL (ref 65–99)
HADV DNA SPEC NAA+PROBE: NOT DETECTED
HCOV 229E RNA SPEC QL NAA+PROBE: NOT DETECTED
HCOV HKU1 RNA SPEC QL NAA+PROBE: NOT DETECTED
HCOV NL63 RNA SPEC QL NAA+PROBE: NOT DETECTED
HCOV OC43 RNA SPEC QL NAA+PROBE: NOT DETECTED
HCT VFR BLD AUTO: 33.9 % (ref 37.5–51)
HGB BLD-MCNC: 11.6 G/DL (ref 13–17.7)
HMPV RNA NPH QL NAA+NON-PROBE: NOT DETECTED
HOLD SPECIMEN: NORMAL
HPIV1 RNA ISLT QL NAA+PROBE: NOT DETECTED
HPIV2 RNA SPEC QL NAA+PROBE: NOT DETECTED
HPIV3 RNA NPH QL NAA+PROBE: NOT DETECTED
HPIV4 P GENE NPH QL NAA+PROBE: NOT DETECTED
IMM GRANULOCYTES # BLD AUTO: 0.02 10*3/MM3 (ref 0–0.05)
IMM GRANULOCYTES NFR BLD AUTO: 0.2 % (ref 0–0.5)
LYMPHOCYTES # BLD AUTO: 1.9 10*3/MM3 (ref 0.7–3.1)
LYMPHOCYTES NFR BLD AUTO: 21.1 % (ref 19.6–45.3)
M PNEUMO IGG SER IA-ACNC: NOT DETECTED
MAGNESIUM SERPL-MCNC: 1.7 MG/DL (ref 1.6–2.6)
MCH RBC QN AUTO: 28.5 PG (ref 26.6–33)
MCHC RBC AUTO-ENTMCNC: 34.2 G/DL (ref 31.5–35.7)
MCV RBC AUTO: 83.3 FL (ref 79–97)
MONOCYTES # BLD AUTO: 0.74 10*3/MM3 (ref 0.1–0.9)
MONOCYTES NFR BLD AUTO: 8.2 % (ref 5–12)
NEUTROPHILS NFR BLD AUTO: 5.82 10*3/MM3 (ref 1.7–7)
NEUTROPHILS NFR BLD AUTO: 64.6 % (ref 42.7–76)
NRBC BLD AUTO-RTO: 0 /100 WBC (ref 0–0.2)
PLATELET # BLD AUTO: 163 10*3/MM3 (ref 140–450)
PMV BLD AUTO: 11.2 FL (ref 6–12)
POTASSIUM SERPL-SCNC: 3.6 MMOL/L (ref 3.5–5.2)
PROT SERPL-MCNC: 6.6 G/DL (ref 6–8.5)
QT INTERVAL: 456 MS
QTC INTERVAL: 529 MS
RBC # BLD AUTO: 4.07 10*6/MM3 (ref 4.14–5.8)
RHINOVIRUS RNA SPEC NAA+PROBE: NOT DETECTED
RSV RNA NPH QL NAA+NON-PROBE: NOT DETECTED
SARS-COV-2 RNA NPH QL NAA+NON-PROBE: NOT DETECTED
SODIUM SERPL-SCNC: 129 MMOL/L (ref 136–145)
TROPONIN T SERPL HS-MCNC: 38 NG/L
WBC NRBC COR # BLD AUTO: 9.01 10*3/MM3 (ref 3.4–10.8)

## 2024-06-10 PROCEDURE — 93010 ELECTROCARDIOGRAM REPORT: CPT | Performed by: STUDENT IN AN ORGANIZED HEALTH CARE EDUCATION/TRAINING PROGRAM

## 2024-06-10 PROCEDURE — 25810000003 SODIUM CHLORIDE 0.9 % SOLUTION: Performed by: EMERGENCY MEDICINE

## 2024-06-10 PROCEDURE — 0202U NFCT DS 22 TRGT SARS-COV-2: CPT | Performed by: EMERGENCY MEDICINE

## 2024-06-10 PROCEDURE — 93005 ELECTROCARDIOGRAM TRACING: CPT | Performed by: EMERGENCY MEDICINE

## 2024-06-10 RX ORDER — POLYETHYLENE GLYCOL 3350 17 G/17G
17 POWDER, FOR SOLUTION ORAL DAILY PRN
Qty: 30 EACH | Refills: 1 | Status: SHIPPED | OUTPATIENT
Start: 2024-06-10 | End: 2024-08-09

## 2024-06-10 RX ADMIN — SODIUM CHLORIDE 1000 ML: 9 INJECTION, SOLUTION INTRAVENOUS at 00:18

## 2024-06-10 NOTE — ED PROVIDER NOTES
Subjective   History of Present Illness  Danilo is a 53-year-old male who presents to the ED for a episode of weakness.  He had been constipated and took multiple doses of laxatives today had a large bowel movement and then was weak and could not stand up.  Patient presents to the ED in no acute distress other than emotional distress.  Patient is tearful, he has an appointment with [facility] vascular later today.  He has no obvious complaints at this time, during the history obtaining he seems more preoccupied about telling me about the people who are mean to him.        Review of Systems   All other systems reviewed and are negative.      No past medical history on file.    No Known Allergies    No past surgical history on file.    No family history on file.    Social History     Socioeconomic History    Marital status:            Objective   Physical Exam  Vitals reviewed.   Constitutional:       Appearance: Normal appearance. He is normal weight.   HENT:      Head: Normocephalic and atraumatic.      Right Ear: External ear normal.      Left Ear: External ear normal.      Nose: Nose normal.      Mouth/Throat:      Mouth: Mucous membranes are moist.      Pharynx: Oropharynx is clear.   Eyes:      Extraocular Movements: Extraocular movements intact.      Conjunctiva/sclera: Conjunctivae normal.      Pupils: Pupils are equal, round, and reactive to light.   Cardiovascular:      Rate and Rhythm: Normal rate and regular rhythm.      Pulses: Normal pulses.      Heart sounds: Normal heart sounds.   Pulmonary:      Effort: Pulmonary effort is normal.      Breath sounds: Normal breath sounds.   Abdominal:      General: Bowel sounds are normal.      Palpations: Abdomen is soft.   Musculoskeletal:         General: Normal range of motion.      Cervical back: Normal range of motion and neck supple. No rigidity.      Right lower leg: Edema present.      Left lower leg: Edema present.   Skin:     General: Skin is warm  and dry.      Capillary Refill: Capillary refill takes less than 2 seconds.   Neurological:      General: No focal deficit present.      Mental Status: He is alert and oriented to person, place, and time.   Psychiatric:         Mood and Affect: Mood normal.         Procedures           ED Course                                             Medical Decision Making  Danilo is a 53-year-old male who presents to the ED for possible dehydration, he states that he was recently at [facility] for 2 days and gave him IV fluids and they discharged him he states he was there for similar complaints.  He is ANO x 4 he is in no acute distress.  He has no complaints, he tolerated p.o.    He states he feels much better he is got an IV infusion patient's labs are unremarkable he does does have CKD.  He had an EKG obtained and it revealed a junctional rhythm with a prolonged QT and 456, ventricular rate of 81 no acute ST changes signify ischemia.    His viral swab was negative    Patient was discharged stable condition to follow-up with his regular doctor.    Patient's troponin returned elevated however through Care Everywhere it was revealed that on the second patient had a troponin it was 58 was downtrending this is roughly his baseline and mom believes secondary to his CKD.    Amount and/or Complexity of Data Reviewed  Labs: ordered.  ECG/medicine tests: ordered.        Final diagnoses:   Diarrhea, unspecified type       ED Disposition  ED Disposition       ED Disposition   Discharge    Condition   Stable    Comment   --               Kobe Shi MD   [address] [facility] [place] [address] [phone]               Medication List      No changes were made to your prescriptions during this visit.            Ji Moreno MD  06/10/24 0122

## 2024-06-10 NOTE — TELEPHONE ENCOUNTER
Notified of appt for scans tomorrow morning with 7:20 arrival, nothing to eat or drink after midnight.

## 2024-06-11 ENCOUNTER — HOSPITAL ENCOUNTER (OUTPATIENT)
Dept: INFUSION THERAPY | Age: 54
Discharge: HOME | End: 2024-06-11
Payer: COMMERCIAL

## 2024-06-11 ENCOUNTER — HOSPITAL ENCOUNTER (OUTPATIENT)
Dept: CT IMAGING | Age: 54
Discharge: HOME | End: 2024-06-11
Payer: COMMERCIAL

## 2024-06-11 ENCOUNTER — TELEPHONE (OUTPATIENT)
Dept: HEMATOLOGY | Age: 54
End: 2024-06-11

## 2024-06-11 ENCOUNTER — TELEPHONE (OUTPATIENT)
Dept: VASCULAR SURGERY | Facility: CLINIC | Age: 54
End: 2024-06-11
Payer: COMMERCIAL

## 2024-06-11 DIAGNOSIS — R41.0 CONFUSION: ICD-10-CM

## 2024-06-11 DIAGNOSIS — R46.89 BEHAVIORAL CHANGE: Primary | ICD-10-CM

## 2024-06-11 DIAGNOSIS — C64.9 RENAL CELL CARCINOMA OF KIDNEY EXCLUDING RENAL PELVIS (HCC): ICD-10-CM

## 2024-06-11 PROCEDURE — 71250 CT THORAX DX C-: CPT

## 2024-06-11 PROCEDURE — 74176 CT ABD & PELVIS W/O CONTRAST: CPT

## 2024-06-11 PROCEDURE — 99211 OFF/OP EST MAY X REQ PHY/QHP: CPT

## 2024-06-11 NOTE — PROGRESS NOTES
Patient presents to clinic today for nurse yuri and reports having swelling in bilateral legs. On evaluation, patient does have swelling in bilateral lower extremities without redness, he reports mild tenderness when his legs are touched. He tells me this is not new for him and that he has an appointment on Wednesday with vascular at [facility]. Fracisco and his significant other, Angela, report \"chemo brain\" and that began a few weeks ago and that he is now experiencing behavioral changes such as getting angry and becoming combative. Dr. Navarro is made aware. Patient will follow up with vascular Wednesday regarding extremity swelling. MRI brain w/wo contrast is ordered for evaluation of \"chemo brain\" and behavioral changes. Patient verbalized understanding and is agreeable to this plan. He agrees that if symptoms worsen he will go to ER for evaluation. He will keep appointment with Dr. Navarro as scheduled for 6/17.

## 2024-06-11 NOTE — TELEPHONE ENCOUNTER
I called patient with his appt for his MRI Brain. Appointment is Thursday May 13 at 0800. Patient and wife verbalized understanding. Electronically signed by Ibis Shelton RN on 6/11/2024 at 11:17 AM

## 2024-06-11 NOTE — TELEPHONE ENCOUNTER
Call, placed to patient and reminded of appointment on 6/12/2024 and patient verbalized understanding,

## 2024-06-12 ENCOUNTER — OFFICE VISIT (OUTPATIENT)
Dept: VASCULAR SURGERY | Facility: CLINIC | Age: 54
End: 2024-06-12
Payer: COMMERCIAL

## 2024-06-12 ENCOUNTER — PATIENT MESSAGE (OUTPATIENT)
Dept: HEMATOLOGY | Age: 54
End: 2024-06-12

## 2024-06-12 VITALS
DIASTOLIC BLOOD PRESSURE: 56 MMHG | OXYGEN SATURATION: 96 % | SYSTOLIC BLOOD PRESSURE: 104 MMHG | HEART RATE: 98 BPM | HEIGHT: 72 IN | BODY MASS INDEX: 32.51 KG/M2 | WEIGHT: 240 LBS

## 2024-06-12 DIAGNOSIS — I87.323 CHRONIC VENOUS HYPERTENSION WITH INFLAMMATION INVOLVING BOTH SIDES: Primary | ICD-10-CM

## 2024-06-12 DIAGNOSIS — E78.5 HYPERLIPIDEMIA, UNSPECIFIED HYPERLIPIDEMIA TYPE: ICD-10-CM

## 2024-06-12 PROCEDURE — 99203 OFFICE O/P NEW LOW 30 MIN: CPT | Performed by: NURSE PRACTITIONER

## 2024-06-12 RX ORDER — ATORVASTATIN CALCIUM 80 MG/1
1 TABLET, FILM COATED ORAL DAILY
COMMUNITY
Start: 2024-03-27

## 2024-06-12 RX ORDER — APIXABAN 5 MG/1
5 TABLET, FILM COATED ORAL EVERY 12 HOURS SCHEDULED
COMMUNITY
Start: 2024-03-11

## 2024-06-12 RX ORDER — BUMETANIDE 1 MG/1
1 TABLET ORAL DAILY
COMMUNITY
Start: 2024-04-24

## 2024-06-12 RX ORDER — RABEPRAZOLE SODIUM 20 MG/1
1 TABLET, DELAYED RELEASE ORAL DAILY
COMMUNITY
Start: 2024-05-29

## 2024-06-12 RX ORDER — INSULIN HUMAN 100 [IU]/ML
10 INJECTION, SUSPENSION SUBCUTANEOUS EVERY MORNING
COMMUNITY
Start: 2024-05-20

## 2024-06-12 NOTE — PROGRESS NOTES
"06/12/2024      Kobe Shi MD  [address] [place] [address] WAY   [address] [id number]    Danilo Mora  1970    Chief Complaint   Patient presents with    NEW PATIENT     Referred from Kobe Shi for Edema. Patient states that he had kidney cancer in past and lost kidney and vena cava. Legs have been swelling for past year. Has had clots in the past. Former smoker of 20 years, quit 15 years ago.        Dear Kobe Shi MD:      HPI  I had the pleasure of seeing your patient Danilo Mora in the office today.  Thank you kindly for this consultation.  As you recall, Danilo Mora is a 53 y.o.  male who you are currently following for routine health maintenance.  He is here today with complaints of bilateral lower extremity edema.  He states they feel heavy and achy at times due to all the swelling.  He does not wear compression stockings, because he cannot get them on.  He does have a history of renal cancer with right nephrectomy.  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  He denies symptoms of claudication or ischemia.  He denies strokelike symptoms.  He is maintained on Eliquis and Lipitor.    Review of Systems   Constitutional: Negative.  Negative for diaphoresis and fever.   HENT: Negative.     Eyes: Negative.    Respiratory: Negative.  Negative for shortness of breath and wheezing.    Cardiovascular:  Positive for leg swelling. Negative for chest pain.   Gastrointestinal: Negative.  Negative for abdominal pain.   Endocrine: Negative.    Genitourinary: Negative.    Musculoskeletal: Negative.    Skin: Negative.    Allergic/Immunologic: Negative.    Neurological: Negative.  Negative for dizziness and weakness.   Hematological: Negative.    Psychiatric/Behavioral: Negative.         /56   Pulse 98   Ht 182.9 cm (72\")   Wt 109 kg (240 lb)   SpO2 96%   BMI 32.55 kg/m²       Physical Exam  Vitals and nursing note " reviewed.   Constitutional:       General: He is not in acute distress.     Appearance: Normal appearance. He is obese. He is not diaphoretic.   HENT:      Head: Normocephalic. No right periorbital erythema or left periorbital erythema.      Nose: Nose normal.   Eyes:      General: No scleral icterus.     Pupils: Pupils are equal.   Cardiovascular:      Rate and Rhythm: Normal rate and regular rhythm.      Pulses: Normal pulses.           Femoral pulses are 2+ on the right side and 2+ on the left side.       Popliteal pulses are 2+ on the right side and 2+ on the left side.        Dorsalis pedis pulses are detected w/ Doppler on the right side and detected w/ Doppler on the left side.        Posterior tibial pulses are detected w/ Doppler on the right side and detected w/ Doppler on the left side.      Heart sounds: Normal heart sounds. No murmur heard.  Pulmonary:      Effort: Pulmonary effort is normal. No respiratory distress.      Breath sounds: Normal breath sounds.   Abdominal:      General: Bowel sounds are normal. There is no distension.      Palpations: Abdomen is soft.      Tenderness: There is no abdominal tenderness. There is no guarding.   Musculoskeletal:         General: No swelling or tenderness. Normal range of motion.      Cervical back: Normal range of motion and neck supple.      Right lower leg: Edema present.      Left lower leg: Edema present.   Feet:      Right foot:      Skin integrity: Skin integrity normal.      Left foot:      Skin integrity: Skin integrity normal.   Skin:     General: Skin is warm and dry.      Findings: No erythema or rash.   Neurological:      General: No focal deficit present.      Mental Status: He is alert and oriented to person, place, and time. Mental status is at baseline.      Cranial Nerves: No cranial nerve deficit.      Gait: Gait normal.   Psychiatric:         Attention and Perception: Attention normal.         Mood and Affect: Mood normal.              Patient Active Problem List   Diagnosis    Hyperlipidemia         ICD-10-CM ICD-9-CM   1. Chronic venous hypertension with inflammation involving both sides  I87.323 459.32   2. Hyperlipidemia, unspecified hyperlipidemia type  E78.5 272.4           Plan: After thoroughly evaluating Danilo Mora, I believe the best course of action is to remain conservative from vascular surgery standpoint.  We will see him back in 1 month with Dr. Gomez per patient request, with noninvasive testing to include VVI for further surveillance.  He has previously been seen by [facility] vascular, and wanted a second opinion.  I have suggested Ace wraps from the base of the toes up to below the knee for compression, since he is unable to apply compression stockings.  Elevate legs when not on them.  He did state that he was going to begin an exercise program at the gym.  He is maintained on Lipitor 80 mg nightly and Eliquis 5 mg twice daily.  The patient can continue taking their current medication regimen as previously planned.  This was all discussed in full with complete understanding.    Thank you for allowing me to participate in the care of your patient.  Please do not hesitate with any questions or concerns.  I will keep you aware of any further encounters with Danilo Mora.        Sincerely yours,         ADITYA Manning

## 2024-06-17 ENCOUNTER — HOSPITAL ENCOUNTER (OUTPATIENT)
Dept: INFUSION THERAPY | Age: 54
Discharge: HOME | End: 2024-06-17

## 2024-06-17 PROBLEM — E78.5 HYPERLIPIDEMIA: Status: ACTIVE | Noted: 2024-06-17

## 2024-06-18 ENCOUNTER — OFFICE VISIT (OUTPATIENT)
Dept: FAMILY MEDICINE CLINIC | Age: 54
End: 2024-06-18
Payer: COMMERCIAL

## 2024-06-18 VITALS
DIASTOLIC BLOOD PRESSURE: 70 MMHG | HEIGHT: 72 IN | WEIGHT: 238 LBS | OXYGEN SATURATION: 95 % | TEMPERATURE: 97.4 F | BODY MASS INDEX: 32.23 KG/M2 | HEART RATE: 103 BPM | SYSTOLIC BLOOD PRESSURE: 112 MMHG

## 2024-06-18 DIAGNOSIS — R41.840 ATTENTION DEFICIT: Primary | ICD-10-CM

## 2024-06-18 DIAGNOSIS — E11.9 TYPE 2 DIABETES MELLITUS WITHOUT COMPLICATION, WITH LONG-TERM CURRENT USE OF INSULIN (HCC): ICD-10-CM

## 2024-06-18 DIAGNOSIS — R60.0 BILATERAL LOWER EXTREMITY EDEMA: ICD-10-CM

## 2024-06-18 DIAGNOSIS — Z79.4 TYPE 2 DIABETES MELLITUS WITHOUT COMPLICATION, WITH LONG-TERM CURRENT USE OF INSULIN (HCC): ICD-10-CM

## 2024-06-18 DIAGNOSIS — E78.5 HYPERLIPIDEMIA, UNSPECIFIED HYPERLIPIDEMIA TYPE: ICD-10-CM

## 2024-06-18 PROCEDURE — 3078F DIAST BP <80 MM HG: CPT | Performed by: FAMILY MEDICINE

## 2024-06-18 PROCEDURE — 3044F HG A1C LEVEL LT 7.0%: CPT | Performed by: FAMILY MEDICINE

## 2024-06-18 PROCEDURE — 3074F SYST BP LT 130 MM HG: CPT | Performed by: FAMILY MEDICINE

## 2024-06-18 PROCEDURE — 99214 OFFICE O/P EST MOD 30 MIN: CPT | Performed by: FAMILY MEDICINE

## 2024-06-18 NOTE — PROGRESS NOTES
friction rub. No gallop.   Pulmonary:      Effort: Pulmonary effort is normal. No respiratory distress.      Breath sounds: Normal breath sounds. No wheezing or rales.   Abdominal:      General: Bowel sounds are normal. There is no distension.      Palpations: Abdomen is soft.      Tenderness: There is no abdominal tenderness.   Musculoskeletal:         General: No deformity (No gross deformities of upper or lower extremities) or signs of injury. Normal range of motion.      Cervical back: Normal range of motion and neck supple. No muscular tenderness.      Right lower leg: Edema present.      Left lower leg: Edema present.   Lymphadenopathy:      Cervical: No cervical adenopathy.   Skin:     General: Skin is warm and dry.      Findings: No erythema or rash.   Neurological:      Mental Status: He is alert and oriented to person, place, and time.      Cranial Nerves: No cranial nerve deficit.   Psychiatric:         Mood and Affect: Mood normal.         Behavior: Behavior normal.         Thought Content: Thought content normal.           Data Reviewed and Summarized       Labs:     Imaging/Testing:        ADÁN ORTIZ MD

## 2024-06-19 ENCOUNTER — TELEPHONE (OUTPATIENT)
Dept: FAMILY MEDICINE CLINIC | Age: 54
End: 2024-06-19

## 2024-06-19 NOTE — TELEPHONE ENCOUNTER
Spoke with patient.  He stated he was good now.  He stated had sent partner to somewhere else.  Instructed to call if needed anything.

## 2024-06-19 NOTE — TELEPHONE ENCOUNTER
Patient left message stating that he needed emergent help now.  Stated had problems with girl friend after he left the office and broke up.  Attempted to call patient.  Left message with patient that if he needed emergent help , he needed to go to our ER at [facility] to get evaluated for mental health.  Instructed him that he could get help today.

## 2024-06-20 ENCOUNTER — TELEPHONE (OUTPATIENT)
Dept: HEMATOLOGY | Age: 54
End: 2024-06-20

## 2024-06-20 NOTE — TELEPHONE ENCOUNTER
Called to schedule follow up appointment with Dr Navarro after MRI. He stated that he was fine and did not want the MRI and might cancel it. He said \"I am through with all this stuff and I my brain was not working but now it is fine\". He stated he will talk to Dr Navarro to see about continuation of care.

## 2024-06-27 ASSESSMENT — ENCOUNTER SYMPTOMS
BACK PAIN: 0
DIARRHEA: 0
COUGH: 0
VOMITING: 0
RHINORRHEA: 0
CONSTIPATION: 0
SHORTNESS OF BREATH: 0
NAUSEA: 0
ABDOMINAL PAIN: 0

## 2024-07-05 ENCOUNTER — TELEPHONE (OUTPATIENT)
Dept: HEMATOLOGY | Age: 54
End: 2024-07-05

## 2024-07-05 NOTE — TELEPHONE ENCOUNTER
Called pt. to remind them of appointment on 07/09/2024 and had to leave a detailed voicemail with appointment date and time. Reminded patient to just come at appointment time, and to not come at the lab appointment time. Reminded patient that we will not check them in any more than 30 minutes before appointment time.

## 2024-07-08 ENCOUNTER — HOSPITAL ENCOUNTER (OUTPATIENT)
Dept: MRI IMAGING | Age: 54
Discharge: HOME | End: 2024-06-13
Attending: INTERNAL MEDICINE

## 2024-07-08 DIAGNOSIS — R46.89 BEHAVIORAL CHANGE: ICD-10-CM

## 2024-07-08 DIAGNOSIS — C64.9 RENAL CELL CARCINOMA OF KIDNEY EXCLUDING RENAL PELVIS (HCC): ICD-10-CM

## 2024-07-08 DIAGNOSIS — R41.0 CONFUSION: ICD-10-CM

## 2024-07-08 NOTE — PROGRESS NOTES
surveillance.      Follow Up:   Return in about 5 months (around 12/16/2024) for NO LABS, Appointment with Fracisco Adame PA-C.   Sched CT C/A/P in Dec 2024 at [facility]     Ibis FAUSTIN am pre-charting as a registered nurse for Kenia Navarro MD. Electronically signed by Ibis Shelton RN on 7/9/2024 at 4:06 PM CDT.  I have seen, examined and reviewed this patient medication list, appropriate labs and imaging studies. I reviewed relevant medical records and others physician’s notes. I discussed the plans of care with the patient. I answered all the questions to the patient’s satisfaction. I have also reviewed the chief complaint (CC) and part of the history (History of Present Illness (HPI), Past Family Social History (PFSH), or Review of Systems (ROS) and made changes when appropriated.       (Please note that portions of this note were completed with a voice recognition program. Efforts were made to edit the dictations but occasionally words are mis-transcribed.)Electronically signed by Kenia Navarro MD on 7/9/2024 at 11:34 AM

## 2024-07-09 ENCOUNTER — OFFICE VISIT (OUTPATIENT)
Dept: HEMATOLOGY | Age: 54
End: 2024-07-09
Payer: COMMERCIAL

## 2024-07-09 ENCOUNTER — HOSPITAL ENCOUNTER (OUTPATIENT)
Dept: INFUSION THERAPY | Age: 54
Discharge: HOME | End: 2024-07-09
Payer: COMMERCIAL

## 2024-07-09 VITALS
WEIGHT: 238.6 LBS | BODY MASS INDEX: 32.32 KG/M2 | SYSTOLIC BLOOD PRESSURE: 138 MMHG | HEIGHT: 72 IN | TEMPERATURE: 97.8 F | HEART RATE: 68 BPM | OXYGEN SATURATION: 98 % | DIASTOLIC BLOOD PRESSURE: 64 MMHG

## 2024-07-09 DIAGNOSIS — C64.9 RENAL CELL CARCINOMA OF KIDNEY EXCLUDING RENAL PELVIS (HCC): Primary | ICD-10-CM

## 2024-07-09 DIAGNOSIS — N28.9 RENAL IMPAIRMENT: ICD-10-CM

## 2024-07-09 DIAGNOSIS — Z86.718 HISTORY OF DVT (DEEP VEIN THROMBOSIS): ICD-10-CM

## 2024-07-09 DIAGNOSIS — Z79.01 CHRONIC ANTICOAGULATION: ICD-10-CM

## 2024-07-09 DIAGNOSIS — Z71.89 CARE PLAN DISCUSSED WITH PATIENT: ICD-10-CM

## 2024-07-09 DIAGNOSIS — C64.9 RENAL CELL CARCINOMA OF KIDNEY EXCLUDING RENAL PELVIS (HCC): ICD-10-CM

## 2024-07-09 DIAGNOSIS — R60.0 BILATERAL LEG EDEMA: ICD-10-CM

## 2024-07-09 PROCEDURE — 99213 OFFICE O/P EST LOW 20 MIN: CPT

## 2024-07-09 PROCEDURE — 3075F SYST BP GE 130 - 139MM HG: CPT | Performed by: INTERNAL MEDICINE

## 2024-07-09 PROCEDURE — 3078F DIAST BP <80 MM HG: CPT | Performed by: INTERNAL MEDICINE

## 2024-07-09 PROCEDURE — 99214 OFFICE O/P EST MOD 30 MIN: CPT | Performed by: INTERNAL MEDICINE

## 2024-07-09 PROCEDURE — G2211 COMPLEX E/M VISIT ADD ON: HCPCS | Performed by: INTERNAL MEDICINE

## 2024-07-10 ENCOUNTER — TELEPHONE (OUTPATIENT)
Dept: FAMILY MEDICINE CLINIC | Age: 54
End: 2024-07-10

## 2024-07-10 DIAGNOSIS — C64.9 RENAL CELL CARCINOMA OF KIDNEY EXCLUDING RENAL PELVIS (HCC): Primary | ICD-10-CM

## 2024-07-10 NOTE — TELEPHONE ENCOUNTER
Patient had left message wanting referrals.  Referral already sent to [facility].  Will send referral to nephrology.

## 2024-07-17 ENCOUNTER — TELEPHONE (OUTPATIENT)
Dept: FAMILY MEDICINE CLINIC | Age: 54
End: 2024-07-17

## 2024-07-17 NOTE — TELEPHONE ENCOUNTER
Patient informed of sleep study results.  Needed referral to [facility].  Need to fax records to [phone].

## 2024-07-18 ENCOUNTER — HOSPITAL ENCOUNTER (OUTPATIENT)
Dept: ULTRASOUND IMAGING | Facility: HOSPITAL | Age: 54
Discharge: HOME | End: 2024-07-18
Payer: COMMERCIAL

## 2024-07-22 ENCOUNTER — TELEPHONE (OUTPATIENT)
Dept: VASCULAR SURGERY | Facility: CLINIC | Age: 54
End: 2024-07-22
Payer: COMMERCIAL

## 2024-07-22 NOTE — TELEPHONE ENCOUNTER
Call placed to patient and reminded of testing and appointment on 7/23/2024 and he has verbalized understanding.

## 2024-07-25 DIAGNOSIS — Z79.4 TYPE 2 DIABETES MELLITUS WITHOUT COMPLICATION, WITH LONG-TERM CURRENT USE OF INSULIN (HCC): ICD-10-CM

## 2024-07-25 DIAGNOSIS — E11.9 TYPE 2 DIABETES MELLITUS WITHOUT COMPLICATION, WITH LONG-TERM CURRENT USE OF INSULIN (HCC): ICD-10-CM

## 2024-08-15 RX ORDER — POLYETHYLENE GLYCOL 3350 17 G/17G
POWDER, FOR SOLUTION ORAL
Qty: 30 EACH | Refills: 0 | Status: SHIPPED | OUTPATIENT
Start: 2024-08-15

## 2024-09-12 DIAGNOSIS — C64.9 RENAL CELL CARCINOMA OF KIDNEY EXCLUDING RENAL PELVIS (HCC): ICD-10-CM

## 2024-09-13 RX ORDER — PREGABALIN 100 MG/1
100 CAPSULE ORAL 2 TIMES DAILY
Qty: 60 CAPSULE | Refills: 0 | Status: SHIPPED | OUTPATIENT
Start: 2024-09-20 | End: 2024-10-20

## 2024-09-25 ENCOUNTER — OFFICE VISIT (OUTPATIENT)
Dept: OTOLARYNGOLOGY | Facility: CLINIC | Age: 54
End: 2024-09-25
Payer: COMMERCIAL

## 2024-09-25 VITALS
WEIGHT: 211 LBS | SYSTOLIC BLOOD PRESSURE: 138 MMHG | TEMPERATURE: 98 F | BODY MASS INDEX: 28.58 KG/M2 | DIASTOLIC BLOOD PRESSURE: 90 MMHG | HEART RATE: 65 BPM | HEIGHT: 72 IN

## 2024-09-25 DIAGNOSIS — G47.33 OSA (OBSTRUCTIVE SLEEP APNEA): Primary | ICD-10-CM

## 2024-09-25 DIAGNOSIS — R40.0 DAYTIME SOMNOLENCE: ICD-10-CM

## 2024-09-25 DIAGNOSIS — Z78.9 INTOLERANCE OF CONTINUOUS POSITIVE AIRWAY PRESSURE (CPAP) VENTILATION: ICD-10-CM

## 2024-09-25 DIAGNOSIS — R06.83 SNORING: ICD-10-CM

## 2024-09-25 RX ORDER — PROCHLORPERAZINE 25 MG/1
SUPPOSITORY RECTAL
COMMUNITY
Start: 2024-09-13

## 2024-09-25 RX ORDER — PROCHLORPERAZINE 25 MG/1
SUPPOSITORY RECTAL
COMMUNITY
Start: 2024-07-29

## 2024-10-12 DIAGNOSIS — K21.9 GASTROESOPHAGEAL REFLUX DISEASE, UNSPECIFIED WHETHER ESOPHAGITIS PRESENT: ICD-10-CM

## 2024-10-13 DIAGNOSIS — E11.9 TYPE 2 DIABETES MELLITUS WITHOUT COMPLICATION, WITH LONG-TERM CURRENT USE OF INSULIN (HCC): ICD-10-CM

## 2024-10-13 DIAGNOSIS — Z79.4 TYPE 2 DIABETES MELLITUS WITHOUT COMPLICATION, WITH LONG-TERM CURRENT USE OF INSULIN (HCC): ICD-10-CM

## 2024-10-14 DIAGNOSIS — K59.00 CONSTIPATION, UNSPECIFIED CONSTIPATION TYPE: Primary | ICD-10-CM

## 2024-10-14 DIAGNOSIS — Z79.4 TYPE 2 DIABETES MELLITUS WITHOUT COMPLICATION, WITH LONG-TERM CURRENT USE OF INSULIN (HCC): ICD-10-CM

## 2024-10-14 DIAGNOSIS — E11.9 TYPE 2 DIABETES MELLITUS WITHOUT COMPLICATION, WITH LONG-TERM CURRENT USE OF INSULIN (HCC): ICD-10-CM

## 2024-10-14 RX ORDER — PANTOPRAZOLE SODIUM 40 MG/1
TABLET, DELAYED RELEASE ORAL
Qty: 30 TABLET | Refills: 0 | Status: SHIPPED | OUTPATIENT
Start: 2024-10-14

## 2024-10-14 RX ORDER — RABEPRAZOLE SODIUM 20 MG/1
20 TABLET, DELAYED RELEASE ORAL DAILY
Qty: 30 TABLET | Refills: 0 | Status: SHIPPED | OUTPATIENT
Start: 2024-10-14

## 2024-10-14 RX ORDER — APIXABAN 5 MG/1
5 TABLET, FILM COATED ORAL 2 TIMES DAILY
Qty: 60 TABLET | Refills: 3 | Status: SHIPPED | OUTPATIENT
Start: 2024-10-14

## 2024-10-14 NOTE — TELEPHONE ENCOUNTER
Requested Prescriptions     Pending Prescriptions Disp Refills    ELIQUIS 5 MG TABS tablet [Pharmacy Med Name: Eliquis 5 MG Oral Tablet] 60 tablet 0     Sig: Take 1 tablet by mouth twice daily       Last Office Visit: Visit date not found  Next Office Visit: Visit date not found  Last Medication Refill: 3/11/2024 with 5 RF

## 2024-10-15 RX ORDER — RABEPRAZOLE SODIUM 20 MG/1
20 TABLET, DELAYED RELEASE ORAL DAILY
Qty: 90 TABLET | Refills: 3 | Status: SHIPPED | OUTPATIENT
Start: 2024-10-15

## 2024-10-15 RX ORDER — PROCHLORPERAZINE 25 MG/1
1 SUPPOSITORY RECTAL
Qty: 3 EACH | Refills: 2 | Status: SHIPPED | OUTPATIENT
Start: 2024-10-15

## 2024-10-15 RX ORDER — POLYETHYLENE GLYCOL 3350 17 G/17G
17 POWDER, FOR SOLUTION ORAL DAILY PRN
Qty: 30 EACH | Refills: 0 | Status: SHIPPED | OUTPATIENT
Start: 2024-10-15

## 2024-10-21 ENCOUNTER — HOSPITAL ENCOUNTER (OUTPATIENT)
Dept: GENERAL RADIOLOGY | Facility: HOSPITAL | Age: 54
Discharge: HOME | End: 2024-10-21
Payer: COMMERCIAL

## 2024-10-21 ENCOUNTER — PRE-ADMISSION TESTING (OUTPATIENT)
Dept: PREADMISSION TESTING | Facility: HOSPITAL | Age: 54
End: 2024-10-21
Payer: COMMERCIAL

## 2024-10-21 VITALS
BODY MASS INDEX: 30.74 KG/M2 | SYSTOLIC BLOOD PRESSURE: 96 MMHG | OXYGEN SATURATION: 95 % | WEIGHT: 214.73 LBS | DIASTOLIC BLOOD PRESSURE: 67 MMHG | RESPIRATION RATE: 18 BRPM | HEART RATE: 78 BPM | HEIGHT: 70 IN

## 2024-10-21 DIAGNOSIS — Z78.9 INTOLERANCE OF CONTINUOUS POSITIVE AIRWAY PRESSURE (CPAP) VENTILATION: ICD-10-CM

## 2024-10-21 DIAGNOSIS — R06.83 SNORING: ICD-10-CM

## 2024-10-21 DIAGNOSIS — R40.0 DAYTIME SOMNOLENCE: ICD-10-CM

## 2024-10-21 DIAGNOSIS — G47.33 OSA (OBSTRUCTIVE SLEEP APNEA): ICD-10-CM

## 2024-10-21 LAB
ANION GAP SERPL CALCULATED.3IONS-SCNC: 8 MMOL/L (ref 5–15)
BUN SERPL-MCNC: 11 MG/DL (ref 6–20)
BUN/CREAT SERPL: 7.5 (ref 7–25)
CALCIUM SPEC-SCNC: 9 MG/DL (ref 8.6–10.5)
CHLORIDE SERPL-SCNC: 94 MMOL/L (ref 98–107)
CO2 SERPL-SCNC: 30 MMOL/L (ref 22–29)
CREAT SERPL-MCNC: 1.46 MG/DL (ref 0.76–1.27)
DEPRECATED RDW RBC AUTO: 41.2 FL (ref 37–54)
EGFRCR SERPLBLD CKD-EPI 2021: 56.1 ML/MIN/1.73
ERYTHROCYTE [DISTWIDTH] IN BLOOD BY AUTOMATED COUNT: 14.1 % (ref 12.3–15.4)
GLUCOSE SERPL-MCNC: 125 MG/DL (ref 65–99)
HCT VFR BLD AUTO: 35.4 % (ref 37.5–51)
HGB BLD-MCNC: 12.4 G/DL (ref 13–17.7)
MCH RBC QN AUTO: 28.1 PG (ref 26.6–33)
MCHC RBC AUTO-ENTMCNC: 35 G/DL (ref 31.5–35.7)
MCV RBC AUTO: 80.3 FL (ref 79–97)
PLATELET # BLD AUTO: 164 10*3/MM3 (ref 140–450)
PMV BLD AUTO: 9.2 FL (ref 6–12)
POTASSIUM SERPL-SCNC: 4.4 MMOL/L (ref 3.5–5.2)
RBC # BLD AUTO: 4.41 10*6/MM3 (ref 4.14–5.8)
SODIUM SERPL-SCNC: 132 MMOL/L (ref 136–145)
WBC NRBC COR # BLD AUTO: 7.17 10*3/MM3 (ref 3.4–10.8)

## 2024-10-21 PROCEDURE — 71045 X-RAY EXAM CHEST 1 VIEW: CPT

## 2024-10-21 PROCEDURE — 80048 BASIC METABOLIC PNL TOTAL CA: CPT

## 2024-10-21 PROCEDURE — 85027 COMPLETE CBC AUTOMATED: CPT

## 2024-10-21 PROCEDURE — 93005 ELECTROCARDIOGRAM TRACING: CPT

## 2024-10-21 PROCEDURE — 36415 COLL VENOUS BLD VENIPUNCTURE: CPT

## 2024-10-21 NOTE — DISCHARGE INSTRUCTIONS

## 2024-10-22 LAB
QT INTERVAL: 412 MS
QTC INTERVAL: 435 MS

## 2024-10-28 ENCOUNTER — ANESTHESIA (OUTPATIENT)
Dept: PERIOP | Facility: HOSPITAL | Age: 54
End: 2024-10-28
Payer: COMMERCIAL

## 2024-10-28 ENCOUNTER — HOSPITAL ENCOUNTER (OUTPATIENT)
Facility: HOSPITAL | Age: 54
Setting detail: HOSPITAL OUTPATIENT SURGERY
Discharge: HOME | End: 2024-10-28
Attending: OTOLARYNGOLOGY | Admitting: OTOLARYNGOLOGY
Payer: COMMERCIAL

## 2024-10-28 ENCOUNTER — ANESTHESIA EVENT (OUTPATIENT)
Dept: PERIOP | Facility: HOSPITAL | Age: 54
End: 2024-10-28
Payer: COMMERCIAL

## 2024-10-28 VITALS
OXYGEN SATURATION: 100 % | HEART RATE: 64 BPM | TEMPERATURE: 96.4 F | DIASTOLIC BLOOD PRESSURE: 97 MMHG | RESPIRATION RATE: 16 BRPM | SYSTOLIC BLOOD PRESSURE: 137 MMHG

## 2024-10-28 LAB — GLUCOSE BLDC GLUCOMTR-MCNC: 86 MG/DL (ref 70–130)

## 2024-10-28 PROCEDURE — 42975 DISE EVAL SLP DO BRTH FLX DX: CPT | Performed by: OTOLARYNGOLOGY

## 2024-10-28 PROCEDURE — 82948 REAGENT STRIP/BLOOD GLUCOSE: CPT

## 2024-10-28 PROCEDURE — 25010000002 PROPOFOL 200 MG/20ML EMULSION: Performed by: NURSE ANESTHETIST, CERTIFIED REGISTERED

## 2024-10-28 PROCEDURE — 25810000003 LACTATED RINGERS PER 1000 ML: Performed by: OTOLARYNGOLOGY

## 2024-10-28 RX ORDER — LIDOCAINE HYDROCHLORIDE 10 MG/ML
0.5 INJECTION, SOLUTION EPIDURAL; INFILTRATION; INTRACAUDAL; PERINEURAL ONCE AS NEEDED
Status: DISCONTINUED | OUTPATIENT
Start: 2024-10-28 | End: 2024-10-28 | Stop reason: HOSPADM

## 2024-10-28 RX ORDER — NALOXONE HCL 0.4 MG/ML
0.4 VIAL (ML) INJECTION AS NEEDED
Status: DISCONTINUED | OUTPATIENT
Start: 2024-10-28 | End: 2024-10-28 | Stop reason: HOSPADM

## 2024-10-28 RX ORDER — DROPERIDOL 2.5 MG/ML
0.62 INJECTION, SOLUTION INTRAMUSCULAR; INTRAVENOUS ONCE AS NEEDED
Status: DISCONTINUED | OUTPATIENT
Start: 2024-10-28 | End: 2024-10-28 | Stop reason: HOSPADM

## 2024-10-28 RX ORDER — ONDANSETRON 2 MG/ML
4 INJECTION INTRAMUSCULAR; INTRAVENOUS ONCE AS NEEDED
Status: DISCONTINUED | OUTPATIENT
Start: 2024-10-28 | End: 2024-10-28 | Stop reason: HOSPADM

## 2024-10-28 RX ORDER — HYDROCODONE BITARTRATE AND ACETAMINOPHEN 10; 325 MG/1; MG/1
1 TABLET ORAL EVERY 4 HOURS PRN
Status: DISCONTINUED | OUTPATIENT
Start: 2024-10-28 | End: 2024-10-28 | Stop reason: HOSPADM

## 2024-10-28 RX ORDER — SODIUM CHLORIDE 0.9 % (FLUSH) 0.9 %
10 SYRINGE (ML) INJECTION EVERY 12 HOURS SCHEDULED
Status: DISCONTINUED | OUTPATIENT
Start: 2024-10-28 | End: 2024-10-28 | Stop reason: HOSPADM

## 2024-10-28 RX ORDER — ONDANSETRON 4 MG/1
4 TABLET, ORALLY DISINTEGRATING ORAL ONCE AS NEEDED
Status: DISCONTINUED | OUTPATIENT
Start: 2024-10-28 | End: 2024-10-28 | Stop reason: HOSPADM

## 2024-10-28 RX ORDER — FLUMAZENIL 0.1 MG/ML
0.2 INJECTION INTRAVENOUS AS NEEDED
Status: DISCONTINUED | OUTPATIENT
Start: 2024-10-28 | End: 2024-10-28 | Stop reason: HOSPADM

## 2024-10-28 RX ORDER — DEXTROSE MONOHYDRATE 25 G/50ML
12.5 INJECTION, SOLUTION INTRAVENOUS AS NEEDED
Status: DISCONTINUED | OUTPATIENT
Start: 2024-10-28 | End: 2024-10-28 | Stop reason: HOSPADM

## 2024-10-28 RX ORDER — LABETALOL HYDROCHLORIDE 5 MG/ML
5 INJECTION, SOLUTION INTRAVENOUS
Status: DISCONTINUED | OUTPATIENT
Start: 2024-10-28 | End: 2024-10-28 | Stop reason: HOSPADM

## 2024-10-28 RX ORDER — HYDROCODONE BITARTRATE AND ACETAMINOPHEN 5; 325 MG/1; MG/1
1 TABLET ORAL EVERY 4 HOURS PRN
Status: DISCONTINUED | OUTPATIENT
Start: 2024-10-28 | End: 2024-10-28 | Stop reason: HOSPADM

## 2024-10-28 RX ORDER — SODIUM CHLORIDE, SODIUM LACTATE, POTASSIUM CHLORIDE, CALCIUM CHLORIDE 600; 310; 30; 20 MG/100ML; MG/100ML; MG/100ML; MG/100ML
1000 INJECTION, SOLUTION INTRAVENOUS CONTINUOUS
Status: DISCONTINUED | OUTPATIENT
Start: 2024-10-28 | End: 2024-10-28 | Stop reason: HOSPADM

## 2024-10-28 RX ORDER — IBUPROFEN 600 MG/1
600 TABLET, FILM COATED ORAL EVERY 6 HOURS PRN
Status: DISCONTINUED | OUTPATIENT
Start: 2024-10-28 | End: 2024-10-28 | Stop reason: HOSPADM

## 2024-10-28 RX ORDER — SODIUM CHLORIDE, SODIUM LACTATE, POTASSIUM CHLORIDE, CALCIUM CHLORIDE 600; 310; 30; 20 MG/100ML; MG/100ML; MG/100ML; MG/100ML
9 INJECTION, SOLUTION INTRAVENOUS CONTINUOUS
Status: DISCONTINUED | OUTPATIENT
Start: 2024-10-28 | End: 2024-10-28 | Stop reason: HOSPADM

## 2024-10-28 RX ORDER — PROPOFOL 10 MG/ML
INJECTION, EMULSION INTRAVENOUS AS NEEDED
Status: DISCONTINUED | OUTPATIENT
Start: 2024-10-28 | End: 2024-10-28 | Stop reason: SURG

## 2024-10-28 RX ORDER — FENTANYL CITRATE 50 UG/ML
50 INJECTION, SOLUTION INTRAMUSCULAR; INTRAVENOUS
Status: DISCONTINUED | OUTPATIENT
Start: 2024-10-28 | End: 2024-10-28 | Stop reason: HOSPADM

## 2024-10-28 RX ORDER — OXYMETAZOLINE HYDROCHLORIDE 0.05 G/100ML
2 SPRAY NASAL
Status: COMPLETED | OUTPATIENT
Start: 2024-10-28 | End: 2024-10-28

## 2024-10-28 RX ORDER — FENTANYL CITRATE 50 UG/ML
25 INJECTION, SOLUTION INTRAMUSCULAR; INTRAVENOUS
Status: DISCONTINUED | OUTPATIENT
Start: 2024-10-28 | End: 2024-10-28 | Stop reason: HOSPADM

## 2024-10-28 RX ORDER — SODIUM CHLORIDE 0.9 % (FLUSH) 0.9 %
10 SYRINGE (ML) INJECTION AS NEEDED
Status: DISCONTINUED | OUTPATIENT
Start: 2024-10-28 | End: 2024-10-28 | Stop reason: HOSPADM

## 2024-10-28 RX ORDER — SODIUM CHLORIDE 0.9 % (FLUSH) 0.9 %
3 SYRINGE (ML) INJECTION AS NEEDED
Status: DISCONTINUED | OUTPATIENT
Start: 2024-10-28 | End: 2024-10-28 | Stop reason: HOSPADM

## 2024-10-28 RX ADMIN — Medication 2 SPRAY: at 09:48

## 2024-10-28 RX ADMIN — PROPOFOL 100 MG: 10 INJECTION, EMULSION INTRAVENOUS at 10:26

## 2024-10-28 RX ADMIN — SODIUM CHLORIDE, POTASSIUM CHLORIDE, SODIUM LACTATE AND CALCIUM CHLORIDE 1000 ML: 600; 310; 30; 20 INJECTION, SOLUTION INTRAVENOUS at 07:59

## 2024-10-28 NOTE — ANESTHESIA POSTPROCEDURE EVALUATION
Patient: Danilo Mroa    Procedure Summary       Date: 10/28/24 Room / Location: [facility] OR  /  [facility] OR    Anesthesia Start: 1021 Anesthesia Stop: 1038    Procedure: VIDEO SLEEP ENDOSCOPY Diagnosis:       MAKENZIE (obstructive sleep apnea)      Daytime somnolence      Snoring      Intolerance of continuous positive airway pressure (CPAP) ventilation      (MAKENZIE (obstructive sleep apnea) [G47.33])      (Daytime somnolence [R40.0])      (Snoring [R06.83])      (Intolerance of continuous positive airway pressure (CPAP) ventilation [Z78.9])    Surgeons: Ross Martinez MD Provider: Brittney Casey CRNA    Anesthesia Type: MAC ASA Status: 3            Anesthesia Type: MAC    Vitals  Vitals Value Taken Time   /114 10/28/24 1051   Temp 96.4 °F (35.8 °C) 10/28/24 1037   Pulse 56 10/28/24 1053   Resp 16 10/28/24 1040   SpO2 95 % 10/28/24 1053   Vitals shown include unfiled device data.        Post Anesthesia Care and Evaluation    Patient location during evaluation: PHASE II  Patient participation: complete - patient participated  Level of consciousness: awake and alert  Pain score: 0  Pain management: adequate    Airway patency: patent  Anesthetic complications: No anesthetic complications  PONV Status: none  Cardiovascular status: acceptable  Respiratory status: acceptable    Comments: /83   Pulse 63   Temp 96.4 °F (35.8 °C) (Temporal)   Resp 16   SpO2 100%

## 2024-10-28 NOTE — OP NOTE
.OPERATIVE NOTE  10/28/2024    NAME: Danilo Mora    YOB: 1970  MRN: [id number]    PRE-OPERATIVE DIAGNOSIS:    MAKENZIE (obstructive sleep apnea) [G47.33]  Daytime somnolence [R40.0]  Snoring [R06.83]  Intolerance of continuous positive airway pressure (CPAP) ventilation [Z78.9]    POST-OPERATIVE DIAGNOSIS:   Post-Op Diagnosis Codes:     * MAKENZIE (obstructive sleep apnea) [G47.33]     * Daytime somnolence [R40.0]     * Snoring [R06.83]     * Intolerance of continuous positive airway pressure (CPAP) ventilation [Z78.9]    PROCEDURE PERFORMED:   Drug-induced video sleep endoscopy    SURGEON:   Ross Martinez MD    ASSISTANT(S):   None    ANESTHESIA:   IV sedation    INDICATIONS: The patient is a 54 y.o. male with MAKENZIE (obstructive sleep apnea) [G47.33]  Daytime somnolence [R40.0]  Snoring [R06.83]  Intolerance of continuous positive airway pressure (CPAP) ventilation [Z78.9]    PROCEDURE:  The patient was brought to the operating room, given IV sedation, and prepped and draped in the usual manner.     The flexible endoscope was inserted to examine both sides of the nose as well as the pharynx and larynx.     The VOTE score at baseline was nearly complete AP collapse with essentially no or very minimal lateral collapse.  With simulated jaw advancement and tongue advancement, the hypopharyngeal obstruction and secondarily the palatal collapse also improved.    Hypopharyngeal and endolaryngeal examination demonstrated mild lymphoid hyperplasia with mild interarytenoid edema.     In summary, there was no significant evidence of complete concentric palatal obstruction and the patient therefore is a candidate for inspire implantation.      The patient was transported upon completion of the procedure to the postanesthesia care unit in stable condition.    SPECIMENS:  None    COMPLICATIONS: NONE    ESTIMATED BLOOD LOSS:  None    Ross Martinez MD  10/28/2024

## 2024-10-28 NOTE — ANESTHESIA PREPROCEDURE EVALUATION
Anesthesia Evaluation     Patient summary reviewed   no history of anesthetic complications:   NPO Solid Status: > 8 hours             Airway   Mallampati: II  Dental      Pulmonary    (+) ,sleep apnea on CPAP  (-) COPD, asthma, not a smoker  Cardiovascular   Exercise tolerance: good (4-7 METS)    (+) DVT  (-) pacemaker, past MI, angina, cardiac stents      Neuro/Psych  (-) seizures, TIA, CVA  GI/Hepatic/Renal/Endo    (+) obesity, GERD, renal disease-, diabetes mellitus using insulin  (-) liver disease    Musculoskeletal     Abdominal    Substance History      OB/GYN          Other      history of cancer                Anesthesia Plan    ASA 3     MAC     intravenous induction     Anesthetic plan, risks, benefits, and alternatives have been provided, discussed and informed consent has been obtained with: patient.    CODE STATUS:

## 2024-10-28 NOTE — H&P
YOB: 1970  Location: [facility]  Location Address: [address] [place] [address], [facility], [address] [place] [address]  Location Phone: [phone]         Chief Complaint   Patient presents with    Sleep Apnea         History of Present Illness  Danilo Mora is a 54 y.o. male.  Danilo Mora is here for evaluation of ENT complaints. The patient has had problems with sleep apnea, fatigue, snoring, and poor CPAP tolerance. He is interested in the inspire.     Danilo Mora was first diagnosed with sleep apnea 20+ years ago.   He has tried using cpap with several different masks/settings for 10+ years.   Currently patient is wearing cpap 0 hours per night.   Patient has not been able to tolerate oral device.     EPWORTH: 19  AHI: 37.2 on 2024  BMI 28.62     Medical History        Past Medical History:   Diagnosis Date    Cancer      Chronic kidney disease      Deep vein thrombosis      Hyperlipidemia      Sleep apnea 2005            Surgical History         Past Surgical History:   Procedure Laterality Date    KIDNEY SURGERY Right       REMOVAL (CANCER)    VENA CAVA FILTER REMOVAL                Medications Taking          Outpatient Medications Marked as Taking for the 24 encounter (Office Visit) with Pete Leone APRN   Medication Sig Dispense Refill    Bromelains (BROMELAIN PO) Take 1 capsule by mouth Daily.        Continuous Glucose Sensor (Dexcom G6 Sensor) USE AS DIRECTED EVERY 10 DAYS.        Continuous Glucose Transmitter (Dexcom G6 Transmitter) misc USE AS DIRECTED EVERY 90 DAYS        Eliquis 5 MG tablet tablet Take 1 tablet by mouth Every 12 (Twelve) Hours.        HumuLIN 70/30 (70-30) 100 UNIT/ML injection Inject 10 Units under the skin into the appropriate area as directed Every Morning.        RABEprazole (ACIPHEX) 20 MG EC tablet Take 1 tablet by mouth Daily.                Patient has no known allergies.           Family History   Problem Relation Age of Onset    Cancer Mother            Mom    Heart failure Father           Dad    Diabetes Father      Stroke Sister      Heart attack Brother           Social History   Social History            Socioeconomic History    Marital status:    Tobacco Use    Smoking status: Former       Types: Cigarettes       Start date:        Quit date:        Years since quittin.7   Vaping Use    Vaping status: Never Used   Substance and Sexual Activity    Alcohol use: Never    Drug use: Never            Review of Systems   Constitutional:  Positive for fatigue.   HENT: Negative.     Respiratory:  Positive for apnea.              Vitals:     24 1421   BP: 138/90   Pulse: 65   Temp: 98 °F (36.7 °C)         Body mass index is 28.62 kg/m².        Objective  Physical Exam  Vitals reviewed.   Constitutional:       Appearance: Normal appearance. He is obese.   HENT:      Head: Normocephalic.      Right Ear: External ear normal.      Left Ear: External ear normal.      Nose: Nose normal.      Mouth/Throat:      Lips: Pink.      Mouth: Mucous membranes are moist.      Pharynx: Oropharynx is clear.      Tonsils: 1+ on the right. 1+ on the left.      Comments: Smith II  Neurological:      Mental Status: He is alert.   Psychiatric:         Behavior: Behavior is cooperative.               Assessment & Plan  Diagnoses and all orders for this visit:     1. MAKENZIE (obstructive sleep apnea) (Primary)  -     Case Request; Standing  -     Basic Metabolic Panel; Future  -     CBC (No Diff); Future  -     ECG 12 Lead; Future  -     XR Chest 1 View; Future  -     Case Request     2. Daytime somnolence  -     Case Request; Standing  -     Basic Metabolic Panel; Future  -     CBC (No Diff); Future  -     ECG 12 Lead; Future  -     XR Chest 1 View; Future  -     Case Request     3. Snoring  -     Case Request; Standing  -     Basic Metabolic Panel; Future  -     CBC (No Diff); Future  -     ECG 12 Lead; Future  -     XR Chest 1 View; Future  -     Case Request      4. Intolerance of continuous positive airway pressure (CPAP) ventilation  -     Case Request; Standing  -     Basic Metabolic Panel; Future  -     CBC (No Diff); Future  -     ECG 12 Lead; Future  -     XR Chest 1 View; Future  -     Case Request     Other orders  -     Follow Anesthesia Guidelines / Protocol; Future  -     Obtain Informed Consent  -     Follow Anesthesia Guidelines / Protocol; Standing        VIDEO SLEEP ENDOSCOPY (N/A)        Orders Placed This Encounter   Procedures    XR Chest 1 View       Standing Status:   Future       Standing Expiration Date:   9/25/2025       Order Specific Question:   Reason for Exam:       Answer:   pre operative       Order Specific Question:   Release to patient       Answer:   Routine Release [[id number]]    Basic Metabolic Panel       Standing Status:   Future       Standing Expiration Date:   9/25/2025       Order Specific Question:   Release to patient       Answer:   Routine Release [[id number]]    CBC (No Diff)       Standing Status:   Future       Standing Expiration Date:   9/25/2025       Order Specific Question:   Release to patient       Answer:   Routine Release [[id number]]    Obtain Informed Consent       Order Specific Question:   Informed Consent Given For       Answer:   Videosleep endoscopy    ECG 12 Lead       Standing Status:   Future       Standing Expiration Date:   9/25/2025       Order Specific Question:   Reason for Exam:       Answer:   pre operative       Order Specific Question:   Release to patient       Answer:   Routine Release [[id number]]      Video sleep endoscopy discussed with patient he wishes to proceed     Return for post op with Dr. Martinez.

## 2024-11-09 DIAGNOSIS — E11.9 TYPE 2 DIABETES MELLITUS WITHOUT COMPLICATION, WITH LONG-TERM CURRENT USE OF INSULIN (HCC): ICD-10-CM

## 2024-11-09 DIAGNOSIS — Z79.4 TYPE 2 DIABETES MELLITUS WITHOUT COMPLICATION, WITH LONG-TERM CURRENT USE OF INSULIN (HCC): ICD-10-CM

## 2024-11-09 DIAGNOSIS — K21.9 GASTROESOPHAGEAL REFLUX DISEASE, UNSPECIFIED WHETHER ESOPHAGITIS PRESENT: ICD-10-CM

## 2024-11-11 RX ORDER — PANTOPRAZOLE SODIUM 40 MG/1
TABLET, DELAYED RELEASE ORAL
Qty: 30 TABLET | Refills: 0 | Status: SHIPPED | OUTPATIENT
Start: 2024-11-11

## 2024-11-12 DIAGNOSIS — C64.9 RENAL CELL CARCINOMA OF KIDNEY EXCLUDING RENAL PELVIS (HCC): ICD-10-CM

## 2024-11-12 NOTE — TELEPHONE ENCOUNTER
Requested Prescriptions     Pending Prescriptions Disp Refills    pregabalin (LYRICA) 100 MG capsule [Pharmacy Med Name: Pregabalin 100 MG Oral Capsule] 60 capsule 0     Sig: TAKE 1 CAPSULE BY MOUTH TWICE DAILY FOR  30  DAYS.  MAX  DAILY  AMOUNT:  200MG.       Last Office Visit:  Visit date not found  Next Office Visit:  Visit date not found  Last Medication Refill:  9/20/2024 with 0 RF   Raymon up to date:  11/12/2024    *RX updated to reflect   11/12/2024   fill date*

## 2024-11-13 RX ORDER — PREGABALIN 100 MG/1
CAPSULE ORAL
Qty: 60 CAPSULE | Refills: 0 | Status: SHIPPED | OUTPATIENT
Start: 2024-11-13 | End: 2024-12-13

## 2024-11-25 NOTE — PROGRESS NOTES
YOB: 1970  Location: [facility]  Location Address: [address] [place] [address], [facility] [address], [address] [place] [address]  Location Phone: [phone]    Chief Complaint   Patient presents with    post op vse       History of Present Illness  Danilo Mora is a 54 y.o. male.  Danilo Mora is here for follow up of ENT complaints. The patient has had problems with sleep apnea, fatigue, snoring and poor cpap tolerance  He was first diagnosed with sleep apnea 20 years ago   Patient has tired using cpap with several different masks/settings for the past 10 years without success  He is currently wearing cpap 0 hours per night   Patient is unable to tolerate oral device   Patient has a history of renal cell carcinoma treated with nephrectomy and keytruda he completed this 3 months ago   He also has a history of significant clotting disorder and multiple dvts and states he has been on eliquis for this   Patient has a history of factor v leiden     EPWORTH: 19  2024 AHI: 37.2   BMI: 30.60       Past Medical History:   Diagnosis Date    Cancer     kidney    Chronic kidney disease     Deep vein thrombosis     Diabetes mellitus     Hyperlipidemia     Sleep apnea 2005       Past Surgical History:   Procedure Laterality Date    KIDNEY SURGERY Right     REMOVAL (CANCER)    SLEEP ENDOSCOPY N/A 10/28/2024    Procedure: VIDEO SLEEP ENDOSCOPY;  Surgeon: Ross Martinez MD;  Location: [facility];  Service: ENT;  Laterality: N/A;    VEIN SURGERY      vein removed related to blood clots    VENA CAVA FILTER REMOVAL         Outpatient Medications Marked as Taking for the 24 encounter (Office Visit) with Ross Martinez MD   Medication Sig Dispense Refill    Bromelains (BROMELAIN PO) Take 1 capsule by mouth Daily.      bumetanide (BUMEX) 1 MG tablet Take 1 tablet by mouth Daily.      Continuous Glucose Sensor (Dexcom G6 Sensor) USE AS DIRECTED EVERY 10 DAYS.      Continuous Glucose Transmitter (Dexcom G6 Transmitter)  misc USE AS DIRECTED EVERY 90 DAYS      Eliquis 5 MG tablet tablet Take 1 tablet by mouth Every 12 (Twelve) Hours.      HumuLIN 70/30 (70-30) 100 UNIT/ML injection Inject  under the skin into the appropriate area as directed Every Morning.      pantoprazole (PROTONIX) 40 MG EC tablet Take 1 tablet by mouth Before Breakfast.      pregabalin (LYRICA) 100 MG capsule TAKE 1 CAPSULE BY MOUTH TWICE DAILY FOR  30  DAYS.  MAX  DAILY  AMOUNT:  200MG.      RABEprazole (ACIPHEX) 20 MG EC tablet Take 1 tablet by mouth Daily.         Ct contrast    Family History   Problem Relation Age of Onset    Cancer Mother         Mom    Heart failure Father         Dad    Diabetes Father     Stroke Sister     Heart attack Brother        Social History     Socioeconomic History    Marital status:    Tobacco Use    Smoking status: Former     Types: Cigarettes     Start date:      Quit date:      Years since quittin.9    Smokeless tobacco: Never   Vaping Use    Vaping status: Never Used   Substance and Sexual Activity    Alcohol use: Not Currently    Drug use: Never       Review of Systems   Constitutional:  Positive for fatigue.   HENT:          Admits snoring      Psychiatric/Behavioral:  Positive for sleep disturbance.        Vitals:    24 1010   BP: 117/85   Pulse: 79   Temp: 97.9 °F (36.6 °C)       Body mass index is 30.6 kg/m².    Objective     Physical Exam  Vitals reviewed.   Constitutional:       Appearance: He is ill-appearing.   HENT:      Head: Normocephalic.      Right Ear: External ear normal.      Left Ear: External ear normal.      Nose: Nose normal.      Mouth/Throat:      Lips: Pink.      Mouth: Mucous membranes are moist.      Comments: Smith III     Neurological:      Mental Status: He is alert.       Dr. Martinez has examined and assessed the patient and agrees with current treatment plan        Assessment & Plan   Diagnoses and all orders for this visit:    1. MAKENZIE (obstructive sleep apnea)  (Primary)    2. Daytime somnolence    3. Snoring    4. Intolerance of continuous positive airway pressure (CPAP) ventilation    5. Anticoagulated  -     Ambulatory Referral to Vascular Surgery      * Surgery not found *  Orders Placed This Encounter   Procedures    Ambulatory Referral to Vascular Surgery     Referral Priority:   Routine     Referral Type:   Consultation     Referral Reason:   Specialty Services Required     Referral Location:   [facility]     Referred to Provider:   Ryland Gomez DO     Requested Specialty:   Vascular Surgery     Number of Visits Requested:   1     No follow-ups on file.     Risks vs benefits of hypoglossal nerve stimulation device implant discussed   Lengthy discussed was had with patient in regards to history of factor v leiden, blood clots and current anticoagulation use   Will refer to Dr. Gomez for evaluation and possible surgical clearance   F/u in January       There are no Patient Instructions on file for this visit.

## 2024-11-26 ENCOUNTER — OFFICE VISIT (OUTPATIENT)
Dept: OTOLARYNGOLOGY | Facility: CLINIC | Age: 54
End: 2024-11-26
Payer: COMMERCIAL

## 2024-11-26 VITALS
HEIGHT: 70 IN | HEART RATE: 79 BPM | SYSTOLIC BLOOD PRESSURE: 117 MMHG | BODY MASS INDEX: 30.78 KG/M2 | TEMPERATURE: 97.9 F | WEIGHT: 215 LBS | DIASTOLIC BLOOD PRESSURE: 85 MMHG

## 2024-11-26 DIAGNOSIS — R40.0 DAYTIME SOMNOLENCE: ICD-10-CM

## 2024-11-26 DIAGNOSIS — G47.33 OSA (OBSTRUCTIVE SLEEP APNEA): Primary | ICD-10-CM

## 2024-11-26 DIAGNOSIS — R06.83 SNORING: ICD-10-CM

## 2024-11-26 DIAGNOSIS — Z78.9 INTOLERANCE OF CONTINUOUS POSITIVE AIRWAY PRESSURE (CPAP) VENTILATION: ICD-10-CM

## 2024-11-26 DIAGNOSIS — Z79.01 ANTICOAGULATED: ICD-10-CM

## 2024-11-26 RX ORDER — PREGABALIN 100 MG/1
CAPSULE ORAL
COMMUNITY
Start: 2024-11-13 | End: 2024-12-14

## 2024-11-26 RX ORDER — PANTOPRAZOLE SODIUM 40 MG/1
40 TABLET, DELAYED RELEASE ORAL
COMMUNITY

## 2024-11-26 RX ORDER — BUMETANIDE 1 MG/1
1 TABLET ORAL DAILY
COMMUNITY

## 2024-12-10 DIAGNOSIS — K21.9 GASTROESOPHAGEAL REFLUX DISEASE, UNSPECIFIED WHETHER ESOPHAGITIS PRESENT: ICD-10-CM

## 2024-12-10 DIAGNOSIS — C64.9 RENAL CELL CARCINOMA OF KIDNEY EXCLUDING RENAL PELVIS (HCC): ICD-10-CM

## 2024-12-10 RX ORDER — PANTOPRAZOLE SODIUM 40 MG/1
TABLET, DELAYED RELEASE ORAL
Qty: 30 TABLET | Refills: 2 | Status: SHIPPED | OUTPATIENT
Start: 2024-12-10

## 2024-12-11 ENCOUNTER — HOSPITAL ENCOUNTER (OUTPATIENT)
Dept: GENERAL RADIOLOGY | Age: 54
Discharge: HOME | End: 2024-12-11
Attending: INTERNAL MEDICINE
Payer: COMMERCIAL

## 2024-12-11 DIAGNOSIS — C64.9 RENAL CELL CARCINOMA OF KIDNEY EXCLUDING RENAL PELVIS (HCC): ICD-10-CM

## 2024-12-11 PROCEDURE — 74176 CT ABD & PELVIS W/O CONTRAST: CPT

## 2024-12-11 PROCEDURE — 71250 CT THORAX DX C-: CPT

## 2024-12-11 RX ORDER — PREGABALIN 100 MG/1
100 CAPSULE ORAL 2 TIMES DAILY
Qty: 60 CAPSULE | Refills: 0 | OUTPATIENT
Start: 2024-12-11 | End: 2025-01-10

## 2024-12-11 NOTE — TELEPHONE ENCOUNTER
Requested Prescriptions     Pending Prescriptions Disp Refills    pregabalin (LYRICA) 100 MG capsule [Pharmacy Med Name: Pregabalin 100 MG Oral Capsule] 60 capsule 0     Sig: Take 1 capsule by mouth 2 times daily for 30 days. Max Daily Amount: 200 mg       Last Office Visit:  Visit date not found  Next Office Visit:  Visit date not found  Last Medication Refill:  11/13/2024 with 0 RF   Raymon up to date:  11/12/2024    *RX updated to reflect   12/13/2024  fill date*      Hospital patient from 8/31/2023 of Dr. Mills

## 2024-12-12 DIAGNOSIS — E11.9 TYPE 2 DIABETES MELLITUS WITHOUT COMPLICATION, WITH LONG-TERM CURRENT USE OF INSULIN (HCC): ICD-10-CM

## 2024-12-12 DIAGNOSIS — Z79.4 TYPE 2 DIABETES MELLITUS WITHOUT COMPLICATION, WITH LONG-TERM CURRENT USE OF INSULIN (HCC): ICD-10-CM

## 2024-12-13 ENCOUNTER — TELEPHONE (OUTPATIENT)
Dept: HEMATOLOGY | Age: 54
End: 2024-12-13

## 2024-12-13 NOTE — PROGRESS NOTES
Family History   Problem Relation Age of Onset    Cancer Mother         Colon cancer    Heart Disease Mother     Heart Disease Father     Diabetes Father     No Known Problems Sister     No Known Problems Sister     Heart Disease Brother 62    No Known Problems Maternal Grandmother     No Known Problems Maternal Grandfather     No Known Problems Paternal Grandmother     No Known Problems Paternal Grandfather        Subjective   REVIEW OF SYSTEMS:   Review of Systems   Constitutional:  Positive for activity change and fatigue. Negative for chills, diaphoresis, fever and unexpected weight change.   HENT:  Negative for mouth sores, nosebleeds, sore throat, trouble swallowing and voice change.    Eyes:  Negative for photophobia, discharge and itching.   Respiratory:  Negative for cough, shortness of breath and wheezing.    Cardiovascular:  Positive for leg swelling (Chronic). Negative for chest pain and palpitations.   Gastrointestinal:  Negative for abdominal distention, abdominal pain, blood in stool, constipation, diarrhea, nausea and vomiting.   Endocrine: Negative for cold intolerance, heat intolerance, polydipsia and polyuria.   Genitourinary:  Negative for difficulty urinating, dysuria, hematuria and urgency.   Musculoskeletal:  Negative for arthralgias, back pain, joint swelling and myalgias.   Skin:  Negative for color change and rash.   Neurological:  Positive for weakness. Negative for dizziness, tremors, seizures, syncope and light-headedness.   Hematological:  Negative for adenopathy. Does not bruise/bleed easily.   Psychiatric/Behavioral:  Negative for behavioral problems and suicidal ideas. The patient is not nervous/anxious.    All other systems reviewed and are negative.      Objective   /76   Pulse 55   Temp 97.2 °F (36.2 °C)   Ht 1.829 m (6')   Wt 101.6 kg (224 lb)   BMI 30.38 kg/m²     PHYSICAL EXAM:  Physical Exam  Vitals reviewed.   Constitutional:       General: He is not in acute

## 2024-12-13 NOTE — TELEPHONE ENCOUNTER

## 2024-12-15 DIAGNOSIS — Z79.4 TYPE 2 DIABETES MELLITUS WITHOUT COMPLICATION, WITH LONG-TERM CURRENT USE OF INSULIN (HCC): ICD-10-CM

## 2024-12-15 DIAGNOSIS — E11.9 TYPE 2 DIABETES MELLITUS WITHOUT COMPLICATION, WITH LONG-TERM CURRENT USE OF INSULIN (HCC): ICD-10-CM

## 2024-12-17 ENCOUNTER — HOSPITAL ENCOUNTER (OUTPATIENT)
Dept: INFUSION THERAPY | Age: 54
Discharge: HOME | End: 2024-12-17
Payer: COMMERCIAL

## 2024-12-17 ENCOUNTER — OFFICE VISIT (OUTPATIENT)
Dept: HEMATOLOGY | Age: 54
End: 2024-12-17
Payer: COMMERCIAL

## 2024-12-17 VITALS
SYSTOLIC BLOOD PRESSURE: 112 MMHG | TEMPERATURE: 97.2 F | BODY MASS INDEX: 30.34 KG/M2 | WEIGHT: 224 LBS | HEART RATE: 55 BPM | HEIGHT: 72 IN | DIASTOLIC BLOOD PRESSURE: 76 MMHG

## 2024-12-17 DIAGNOSIS — I82.503 CHRONIC DEEP VEIN THROMBOSIS (DVT) OF BOTH LOWER EXTREMITIES, UNSPECIFIED VEIN (HCC): ICD-10-CM

## 2024-12-17 DIAGNOSIS — Z85.528 HISTORY OF RENAL CELL CARCINOMA: Primary | ICD-10-CM

## 2024-12-17 DIAGNOSIS — D68.51 FACTOR 5 LEIDEN MUTATION, HETEROZYGOUS (HCC): ICD-10-CM

## 2024-12-17 DIAGNOSIS — R53.82 CHRONIC FATIGUE: ICD-10-CM

## 2024-12-17 DIAGNOSIS — Z79.01 CHRONIC ANTICOAGULATION: ICD-10-CM

## 2024-12-17 DIAGNOSIS — C64.9 RENAL CELL CARCINOMA OF KIDNEY EXCLUDING RENAL PELVIS (HCC): ICD-10-CM

## 2024-12-17 DIAGNOSIS — D50.9 MICROCYTIC ANEMIA: ICD-10-CM

## 2024-12-17 DIAGNOSIS — Z13.29 SCREENING FOR THYROID DISORDER: ICD-10-CM

## 2024-12-17 DIAGNOSIS — C64.9 RENAL CELL CARCINOMA OF KIDNEY EXCLUDING RENAL PELVIS (HCC): Primary | ICD-10-CM

## 2024-12-17 LAB
ALBUMIN SERPL-MCNC: 4 G/DL (ref 3.5–5.2)
ALP SERPL-CCNC: 46 U/L (ref 40–129)
ALT SERPL-CCNC: 25 U/L (ref 5–41)
ANION GAP SERPL CALCULATED.3IONS-SCNC: 8 MMOL/L (ref 7–19)
AST SERPL-CCNC: 59 U/L (ref 5–40)
BASOPHILS # BLD: 0.09 K/UL (ref 0.01–0.08)
BASOPHILS NFR BLD: 1.4 % (ref 0.1–1.2)
BILIRUB SERPL-MCNC: 0.8 MG/DL (ref 0–1.2)
BUN SERPL-MCNC: 19 MG/DL (ref 6–20)
CALCIUM SERPL-MCNC: 9.1 MG/DL (ref 8.6–10)
CHLORIDE SERPL-SCNC: 94 MMOL/L (ref 98–107)
CO2 SERPL-SCNC: 29 MMOL/L (ref 22–29)
CREAT SERPL-MCNC: 1.3 MG/DL (ref 0.7–1.2)
EOSINOPHIL # BLD: 0.31 K/UL (ref 0.04–0.54)
EOSINOPHIL NFR BLD: 4.9 % (ref 0.7–7)
ERYTHROCYTE [DISTWIDTH] IN BLOOD BY AUTOMATED COUNT: 15.5 % (ref 11.6–14.4)
FERRITIN SERPL-MCNC: 544.4 NG/ML (ref 30–400)
GLUCOSE SERPL-MCNC: 95 MG/DL (ref 70–99)
HCT VFR BLD AUTO: 30.1 % (ref 40.1–51)
HGB BLD-MCNC: 10.9 G/DL (ref 13.7–17.5)
IRON SATN MFR SERPL: 43 % (ref 14–50)
IRON SERPL-MCNC: 91 UG/DL (ref 59–158)
LYMPHOCYTES # BLD: 2.11 K/UL (ref 1.18–3.74)
LYMPHOCYTES NFR BLD: 33.3 % (ref 19.3–53.1)
MCH RBC QN AUTO: 29.6 PG (ref 25.7–32.2)
MCHC RBC AUTO-ENTMCNC: 36.2 G/DL (ref 32.3–36.5)
MCV RBC AUTO: 81.8 FL (ref 79–92.2)
MONOCYTES # BLD: 0.29 K/UL (ref 0.24–0.82)
MONOCYTES NFR BLD: 4.6 % (ref 4.7–12.5)
NEUTROPHILS # BLD: 3.52 K/UL (ref 1.56–6.13)
NEUTS SEG NFR BLD: 55.6 % (ref 34–71.1)
PLATELET # BLD AUTO: 141 K/UL (ref 163–337)
PMV BLD AUTO: 9.1 FL (ref 7.4–10.4)
POTASSIUM SERPL-SCNC: 3.9 MMOL/L (ref 3.5–5.1)
PROT SERPL-MCNC: 6.7 G/DL (ref 6.4–8.3)
RBC # BLD AUTO: 3.68 M/UL (ref 4.63–6.08)
SODIUM SERPL-SCNC: 131 MMOL/L (ref 136–145)
T4 FREE SERPL-MCNC: 0.1 NG/DL (ref 0.93–1.7)
TIBC SERPL-MCNC: 214 UG/DL (ref 250–400)
TSH SERPL DL<=0.005 MIU/L-ACNC: 147.9 UIU/ML (ref 0.27–4.2)
WBC # BLD AUTO: 6.33 K/UL (ref 4.23–9.07)

## 2024-12-17 PROCEDURE — 80053 COMPREHEN METABOLIC PANEL: CPT

## 2024-12-17 PROCEDURE — 3074F SYST BP LT 130 MM HG: CPT | Performed by: PHYSICIAN ASSISTANT

## 2024-12-17 PROCEDURE — 99213 OFFICE O/P EST LOW 20 MIN: CPT

## 2024-12-17 PROCEDURE — 36415 COLL VENOUS BLD VENIPUNCTURE: CPT

## 2024-12-17 PROCEDURE — 99215 OFFICE O/P EST HI 40 MIN: CPT | Performed by: PHYSICIAN ASSISTANT

## 2024-12-17 PROCEDURE — 85025 COMPLETE CBC W/AUTO DIFF WBC: CPT

## 2024-12-17 PROCEDURE — 84439 ASSAY OF FREE THYROXINE: CPT

## 2024-12-17 PROCEDURE — 3078F DIAST BP <80 MM HG: CPT | Performed by: PHYSICIAN ASSISTANT

## 2024-12-17 PROCEDURE — 84443 ASSAY THYROID STIM HORMONE: CPT

## 2024-12-17 ASSESSMENT — ENCOUNTER SYMPTOMS
SORE THROAT: 0
COLOR CHANGE: 0
VOMITING: 0
CONSTIPATION: 0
DIARRHEA: 0
EYE ITCHING: 0
EYE DISCHARGE: 0
PHOTOPHOBIA: 0
SHORTNESS OF BREATH: 0
WHEEZING: 0
NAUSEA: 0
BACK PAIN: 0
TROUBLE SWALLOWING: 0
ABDOMINAL DISTENTION: 0
ABDOMINAL PAIN: 0
VOICE CHANGE: 0
COUGH: 0
BLOOD IN STOOL: 0

## 2024-12-18 ENCOUNTER — TELEPHONE (OUTPATIENT)
Dept: HEMATOLOGY | Age: 54
End: 2024-12-18

## 2024-12-18 DIAGNOSIS — E03.9 HYPOTHYROIDISM, UNSPECIFIED TYPE: Primary | ICD-10-CM

## 2024-12-18 RX ORDER — LEVOTHYROXINE SODIUM 100 UG/1
100 TABLET ORAL DAILY
Qty: 30 TABLET | Refills: 3 | Status: SHIPPED | OUTPATIENT
Start: 2024-12-18

## 2024-12-18 NOTE — TELEPHONE ENCOUNTER
Spoke with Fracisco regarding TSH. I spoke with him regarding synthroid and directions for use. Apt given for follow up.

## 2024-12-18 NOTE — TELEPHONE ENCOUNTER
----- Message from Fracisco Adame PA-C sent at 12/18/2024  7:53 AM CST -----  I discussed this with Dr. Navarro.  Start him on 100 mcg levothyroxine daily TSH in 3 weeks

## 2024-12-18 NOTE — TELEPHONE ENCOUNTER
Msg left with [facility] office regarding TSH. I left my office number for Fracisco to call me back.

## 2024-12-19 ENCOUNTER — TELEPHONE (OUTPATIENT)
Dept: VASCULAR SURGERY | Facility: CLINIC | Age: 54
End: 2024-12-19
Payer: COMMERCIAL

## 2024-12-19 NOTE — TELEPHONE ENCOUNTER
Spoke with patient and reminded of testing and appointment on 12/20/2024 and he has voiced understanding

## 2024-12-20 ENCOUNTER — HOSPITAL ENCOUNTER (OUTPATIENT)
Dept: ULTRASOUND IMAGING | Facility: HOSPITAL | Age: 54
Discharge: HOME | End: 2024-12-20
Admitting: NURSE PRACTITIONER
Payer: COMMERCIAL

## 2024-12-20 ENCOUNTER — OFFICE VISIT (OUTPATIENT)
Dept: VASCULAR SURGERY | Facility: CLINIC | Age: 54
End: 2024-12-20
Payer: COMMERCIAL

## 2024-12-20 VITALS
DIASTOLIC BLOOD PRESSURE: 60 MMHG | WEIGHT: 240 LBS | HEART RATE: 72 BPM | SYSTOLIC BLOOD PRESSURE: 114 MMHG | HEIGHT: 72 IN | BODY MASS INDEX: 32.51 KG/M2

## 2024-12-20 DIAGNOSIS — I87.323 CHRONIC VENOUS HYPERTENSION WITH INFLAMMATION INVOLVING BOTH SIDES: ICD-10-CM

## 2024-12-20 DIAGNOSIS — I87.323 CHRONIC VENOUS HYPERTENSION WITH INFLAMMATION INVOLVING BOTH SIDES: Primary | ICD-10-CM

## 2024-12-20 DIAGNOSIS — E11.9 TYPE 2 DIABETES MELLITUS WITHOUT COMPLICATION, UNSPECIFIED WHETHER LONG TERM INSULIN USE: ICD-10-CM

## 2024-12-20 DIAGNOSIS — E66.09 CLASS 1 OBESITY DUE TO EXCESS CALORIES WITH BODY MASS INDEX (BMI) OF 32.0 TO 32.9 IN ADULT, UNSPECIFIED WHETHER SERIOUS COMORBIDITY PRESENT: ICD-10-CM

## 2024-12-20 DIAGNOSIS — I65.23 BILATERAL CAROTID ARTERY STENOSIS: ICD-10-CM

## 2024-12-20 DIAGNOSIS — E78.5 HYPERLIPIDEMIA, UNSPECIFIED HYPERLIPIDEMIA TYPE: ICD-10-CM

## 2024-12-20 DIAGNOSIS — I89.0 LYMPHEDEMA: ICD-10-CM

## 2024-12-20 DIAGNOSIS — I10 BENIGN ESSENTIAL HYPERTENSION: ICD-10-CM

## 2024-12-20 DIAGNOSIS — E66.811 CLASS 1 OBESITY DUE TO EXCESS CALORIES WITH BODY MASS INDEX (BMI) OF 32.0 TO 32.9 IN ADULT, UNSPECIFIED WHETHER SERIOUS COMORBIDITY PRESENT: ICD-10-CM

## 2024-12-20 PROCEDURE — 93970 EXTREMITY STUDY: CPT

## 2024-12-20 NOTE — LETTER
December 21, 2024     ADITYA Barrientos  [address] [facility]  [address]  [place] [address]    Patient: Danilo Mora   YOB: 1970   Date of Visit: 12/20/2024     Dear ADITYA Barrientos:       Thank you for referring Danilo Mora to me for evaluation. Below are the relevant portions of my assessment and plan of care.    If you have questions, please do not hesitate to call me. I look forward to following Danilo along with you.         Sincerely,        ADITYA Manning        CC: No Recipients    Destini Meeks APRN  12/21/24 1030  Sign when Signing Visit  12/21/2024          Kobe Shi MD  [address]  [place] [address]      Danilo Mora  1970    Chief Complaint   Patient presents with   • Follow-up     6 month follow up w/ testing. Last seen 6/12/24. Patient denies any stroke type symptoms. Mentions Ross Martinez doing surgery and want to know if he's okay to proceed due to clotting disorder.        Dear Kobe Shi MD        HPI  I had the pleasure of seeing your patient Danilo Mora in the office today.  As you recall, Danilo Mora is a 54 y.o.  male who you are currently following for routine health maintenance.  He returns today for 6-month follow-up with testing.  He was seen previously with complaints of bilateral lower extremity edema, heaviness and aching due to swelling.  He was seen in September at [place] for leg swelling, and they suggested the same treatment plan as we did previously.  Compression, elevation, home lymphedema pumps, and decongestive therapy.  He states his legs are feeling better.  He previously reported his vena cava being removed due to cancer, however I could not find this in any history.  On 8/22/2023 patient underwent right nephrectomy with IVC thrombectomy by Dr. Moisés Renae at [facility] due to renal carcinoma.  He does wear compression stockings and he will utilize his home lymphedema pumps occasionally.  He does have a history of extensive DVTs  "to bilateral lower extremities.  He reports having a clotting disorder, family history of factor V Leiden.  He denies any symptoms of claudication or ischemia.  He denies any strokelike symptoms.  He is planning to undergo hypoglossal nerve stimulation device implant (Inspire) for sleep apnea by Dr. Martinez and is questioning if he is okay to come off of his blood thinner.  He is maintained on Eliquis.  We do not prescribe Eliquis, but my suggestion is that he is transitioned to Lovenox by his PCP while being off of Eliquis for any procedure.  With his history of DVTs and reported clotting disorder, this would be in his best interest.  He did have noninvasive testing performed, which I did review in office.     Review of Systems   Constitutional: Negative.  Negative for diaphoresis and fever.   HENT: Negative.     Eyes: Negative.    Respiratory: Negative.  Negative for shortness of breath and wheezing.    Cardiovascular:  Positive for leg swelling. Negative for chest pain.   Gastrointestinal: Negative.  Negative for abdominal pain.   Endocrine: Negative.    Genitourinary: Negative.    Musculoskeletal: Negative.    Skin: Negative.    Allergic/Immunologic: Negative.    Neurological: Negative.  Negative for dizziness and weakness.   Hematological: Negative.    Psychiatric/Behavioral: Negative.         /60   Pulse 72   Ht 182.9 cm (72\")   Wt 109 kg (240 lb)   BMI 32.55 kg/m²       Physical Exam  Vitals and nursing note reviewed.   Constitutional:       General: He is not in acute distress.     Appearance: Normal appearance. He is obese. He is not diaphoretic.   HENT:      Head: Normocephalic. No right periorbital erythema or left periorbital erythema.      Nose: Nose normal.   Eyes:      General: No scleral icterus.     Pupils: Pupils are equal.   Cardiovascular:      Rate and Rhythm: Normal rate and regular rhythm.      Pulses: Normal pulses.      Heart sounds: Normal heart sounds. No murmur heard.  Pulmonary: "      Effort: Pulmonary effort is normal. No respiratory distress.      Breath sounds: Normal breath sounds.   Abdominal:      General: Bowel sounds are normal. There is no distension.      Palpations: Abdomen is soft.      Tenderness: There is no abdominal tenderness. There is no guarding.   Musculoskeletal:         General: No swelling or tenderness. Normal range of motion.      Cervical back: Normal range of motion and neck supple.      Right lower le+ Edema present.      Left lower le+ Edema present.   Feet:      Right foot:      Skin integrity: Skin integrity normal.      Left foot:      Skin integrity: Skin integrity normal.   Skin:     General: Skin is warm and dry.      Findings: No erythema or rash.   Neurological:      General: No focal deficit present.      Mental Status: He is alert and oriented to person, place, and time. Mental status is at baseline.      Cranial Nerves: No cranial nerve deficit.      Gait: Gait normal.   Psychiatric:         Attention and Perception: Attention normal.         Mood and Affect: Mood normal.         Behavior: Behavior normal.         Thought Content: Thought content normal.         Judgment: Judgment normal.     DIAGNOSTIC DATA    Patient Active Problem List   Diagnosis   • Hyperlipidemia   • MAKENZIE on CPAP   • Daytime somnolence   • Snoring   • Intolerance of continuous positive airway pressure (CPAP) ventilation   • Benign essential hypertension   • Chronic back pain   • Chest pain   • History of nephrectomy   • Long term current use of insulin   • Recurrent major depressive disorder, in partial remission   • Renal cell carcinoma of kidney excluding renal pelvis   • Stage 2 chronic kidney disease   • Suicidal ideation   • Type 2 diabetes mellitus without complication   • Vitamin D deficiency         ICD-10-CM ICD-9-CM   1. Chronic venous hypertension with inflammation involving both sides  I87.323 459.32   2. Bilateral carotid artery stenosis  I65.23 433.10      433.30   3. Lymphedema  I89.0 457.1   4. Benign essential hypertension  I10 401.1   5. Hyperlipidemia, unspecified hyperlipidemia type  E78.5 272.4   6. Type 2 diabetes mellitus without complication, unspecified whether long term insulin use  E11.9 250.00   7. Class 1 obesity due to excess calories with body mass index (BMI) of 32.0 to 32.9 in adult, unspecified whether serious comorbidity present  E66.811 278.00    E66.09 V85.32    Z68.32            Plan: After thoroughly evaluating Danilo Mora, I believe the best course of action is to remain conservative from vascular surgery standpoint.  Overall he is doing okay with his bilateral lower extremity swelling.  He does have the tools to help aid with his swelling, and utilizes them at his leisure.  He reports exercising more frequently.  He did arrive to the office on a scooter due to the extensive walking in the hospital.  His VVI is negative for venous insufficiency to his right lower extremity and minimal reflux was located in his SFJ.  Chronic extensive DVT bilaterally was noted.  He is maintained on Eliquis 5 mg twice daily, he is no longer taking Lipitor or any cholesterol medication at this time.  I did discuss vascular risk factors as they pertain to the progression of vascular disease including controlling his hypertension, hyperlipidemia, and diabetes.  His blood pressure stable today in office.  His last lipid panel 6 months ago shows total cholesterol 119, triglycerides 164, HDL 19, LDL 67.  His most recent hemoglobin A1c is controlled at 5.1% in June 2024.  We will see him back in 1 year with noninvasive testing to include carotid duplex for continued surveillance. The patient can continue taking their current medication regimen as previously planned.  Body mass index is 32.55 kg/m².  BMI is >= 30 and <35. (Class 1 Obesity). The following options were offered after discussion;: exercise counseling/recommendations and nutrition  counseling/recommendations.     This was all discussed in full with complete understanding.    Thank you for allowing me to participate in the care of your patient.  Please do not hesitate with any questions or concerns.  I will keep you aware of any further encounters with Danilo Mora.        Sincerely yours,         ADITYA Manning

## 2024-12-21 PROBLEM — I82.403 DEEP VEIN THROMBOSIS (DVT) OF BOTH LOWER EXTREMITIES: Status: RESOLVED | Noted: 2023-08-16 | Resolved: 2024-12-21

## 2024-12-21 PROBLEM — C64.9 CANCER OF KIDNEY: Status: ACTIVE | Noted: 2023-09-11

## 2024-12-21 PROBLEM — R79.89 ELEVATED SERUM CREATININE: Status: RESOLVED | Noted: 2023-10-25 | Resolved: 2024-12-21

## 2024-12-21 PROBLEM — I82.4Z3 DVT, LOWER EXTREMITY, DISTAL, ACUTE, BILATERAL: Status: RESOLVED | Noted: 2023-08-15 | Resolved: 2024-12-21

## 2024-12-21 PROBLEM — E86.0 LUETSCHER'S SYNDROME: Status: RESOLVED | Noted: 2023-10-25 | Resolved: 2024-12-21

## 2024-12-21 PROBLEM — N18.31 STAGE 3A CHRONIC KIDNEY DISEASE: Status: RESOLVED | Noted: 2024-12-21 | Resolved: 2024-12-21

## 2024-12-21 PROBLEM — E11.9 TYPE 2 DIABETES MELLITUS WITHOUT COMPLICATION: Status: ACTIVE | Noted: 2022-02-04

## 2024-12-21 PROBLEM — E55.9 VITAMIN D DEFICIENCY: Status: ACTIVE | Noted: 2024-12-21

## 2024-12-21 PROBLEM — N28.89 RIGHT RENAL MASS: Status: RESOLVED | Noted: 2023-08-17 | Resolved: 2024-12-21

## 2024-12-21 PROBLEM — Z79.4 LONG TERM CURRENT USE OF INSULIN: Status: ACTIVE | Noted: 2024-12-21

## 2024-12-21 PROBLEM — G89.29 CHRONIC BACK PAIN: Status: ACTIVE | Noted: 2022-02-04

## 2024-12-21 PROBLEM — N17.9 AKI (ACUTE KIDNEY INJURY): Status: RESOLVED | Noted: 2023-08-15 | Resolved: 2024-12-21

## 2024-12-21 PROBLEM — E78.5 HYPERLIPIDEMIA: Status: ACTIVE | Noted: 2022-02-04

## 2024-12-21 PROBLEM — I10 BENIGN ESSENTIAL HYPERTENSION: Status: ACTIVE | Noted: 2022-02-04

## 2024-12-21 PROBLEM — N18.2 STAGE 2 CHRONIC KIDNEY DISEASE: Status: ACTIVE | Noted: 2024-12-21

## 2024-12-21 PROBLEM — C64.9: Status: ACTIVE | Noted: 2023-08-31

## 2024-12-21 PROBLEM — M54.9 CHRONIC BACK PAIN: Status: ACTIVE | Noted: 2022-02-04

## 2024-12-21 PROBLEM — R45.851 SUICIDAL IDEATION: Status: ACTIVE | Noted: 2024-06-01

## 2024-12-21 PROBLEM — Z90.5 HISTORY OF NEPHRECTOMY: Status: ACTIVE | Noted: 2024-12-21

## 2024-12-21 PROBLEM — R07.9 CHEST PAIN: Status: ACTIVE | Noted: 2024-06-01

## 2024-12-21 PROBLEM — G47.33 OSA ON CPAP: Status: ACTIVE | Noted: 2022-02-04

## 2024-12-21 PROBLEM — F33.41 RECURRENT MAJOR DEPRESSIVE DISORDER, IN PARTIAL REMISSION: Status: ACTIVE | Noted: 2022-02-04

## 2024-12-21 NOTE — PROGRESS NOTES
12/21/2024          Kobe Shi MD  [address] [place] [address] WAY   [place] [address]      Danilo Mroa  1970    Chief Complaint   Patient presents with    Follow-up     6 month follow up w/ testing. Last seen 6/12/24. Patient denies any stroke type symptoms. Mentions Ross Martinez doing surgery and want to know if he's okay to proceed due to clotting disorder.        Dear Kobe Shi MD        HPI  I had the pleasure of seeing your patient Danilo Mora in the office today.  As you recall, Danilo Mora is a 54 y.o.  male who you are currently following for routine health maintenance.  He returns today for 6-month follow-up with testing.  He was seen previously with complaints of bilateral lower extremity edema, heaviness and aching due to swelling.  He was seen in September at [place] for leg swelling, and they suggested the same treatment plan as we did previously.  Compression, elevation, home lymphedema pumps, and decongestive therapy.  He states his legs are feeling better.  He previously reported his vena cava being removed due to cancer, however I could not find this in any history.  On 8/22/2023 patient underwent right nephrectomy with IVC thrombectomy by Dr. Moisés Renae at [facility] due to renal carcinoma.  He does wear compression stockings and he will utilize his home lymphedema pumps occasionally.  He does have a history of extensive DVTs to bilateral lower extremities.  He reports having a clotting disorder, family history of factor V Leiden.  He denies any symptoms of claudication or ischemia.  He denies any strokelike symptoms.  He is planning to undergo hypoglossal nerve stimulation device implant (Inspire) for sleep apnea by Dr. Martinez and is questioning if he is okay to come off of his blood thinner.  He is maintained on Eliquis.  We do not prescribe Eliquis, but my suggestion is that he is transitioned to Lovenox by his PCP while being off of Eliquis for any procedure.  With his history of  "DVTs and reported clotting disorder, this would be in his best interest.  He did have noninvasive testing performed, which I did review in office.     Review of Systems   Constitutional: Negative.  Negative for diaphoresis and fever.   HENT: Negative.     Eyes: Negative.    Respiratory: Negative.  Negative for shortness of breath and wheezing.    Cardiovascular:  Positive for leg swelling. Negative for chest pain.   Gastrointestinal: Negative.  Negative for abdominal pain.   Endocrine: Negative.    Genitourinary: Negative.    Musculoskeletal: Negative.    Skin: Negative.    Allergic/Immunologic: Negative.    Neurological: Negative.  Negative for dizziness and weakness.   Hematological: Negative.    Psychiatric/Behavioral: Negative.         /60   Pulse 72   Ht 182.9 cm (72\")   Wt 109 kg (240 lb)   BMI 32.55 kg/m²       Physical Exam  Vitals and nursing note reviewed.   Constitutional:       General: He is not in acute distress.     Appearance: Normal appearance. He is obese. He is not diaphoretic.   HENT:      Head: Normocephalic. No right periorbital erythema or left periorbital erythema.      Nose: Nose normal.   Eyes:      General: No scleral icterus.     Pupils: Pupils are equal.   Cardiovascular:      Rate and Rhythm: Normal rate and regular rhythm.      Pulses: Normal pulses.      Heart sounds: Normal heart sounds. No murmur heard.  Pulmonary:      Effort: Pulmonary effort is normal. No respiratory distress.      Breath sounds: Normal breath sounds.   Abdominal:      General: Bowel sounds are normal. There is no distension.      Palpations: Abdomen is soft.      Tenderness: There is no abdominal tenderness. There is no guarding.   Musculoskeletal:         General: No swelling or tenderness. Normal range of motion.      Cervical back: Normal range of motion and neck supple.      Right lower le+ Edema present.      Left lower le+ Edema present.   Feet:      Right foot:      Skin integrity: Skin " integrity normal.      Left foot:      Skin integrity: Skin integrity normal.   Skin:     General: Skin is warm and dry.      Findings: No erythema or rash.   Neurological:      General: No focal deficit present.      Mental Status: He is alert and oriented to person, place, and time. Mental status is at baseline.      Cranial Nerves: No cranial nerve deficit.      Gait: Gait normal.   Psychiatric:         Attention and Perception: Attention normal.         Mood and Affect: Mood normal.         Behavior: Behavior normal.         Thought Content: Thought content normal.         Judgment: Judgment normal.     DIAGNOSTIC DATA    Patient Active Problem List   Diagnosis    Hyperlipidemia    MAKENZIE on CPAP    Daytime somnolence    Snoring    Intolerance of continuous positive airway pressure (CPAP) ventilation    Benign essential hypertension    Chronic back pain    Chest pain    History of nephrectomy    Long term current use of insulin    Recurrent major depressive disorder, in partial remission    Renal cell carcinoma of kidney excluding renal pelvis    Stage 2 chronic kidney disease    Suicidal ideation    Type 2 diabetes mellitus without complication    Vitamin D deficiency         ICD-10-CM ICD-9-CM   1. Chronic venous hypertension with inflammation involving both sides  I87.323 459.32   2. Bilateral carotid artery stenosis  I65.23 433.10     433.30   3. Lymphedema  I89.0 457.1   4. Benign essential hypertension  I10 401.1   5. Hyperlipidemia, unspecified hyperlipidemia type  E78.5 272.4   6. Type 2 diabetes mellitus without complication, unspecified whether long term insulin use  E11.9 250.00   7. Class 1 obesity due to excess calories with body mass index (BMI) of 32.0 to 32.9 in adult, unspecified whether serious comorbidity present  E66.811 278.00    E66.09 V85.32    Z68.32            Plan: After thoroughly evaluating Danilo Mora, I believe the best course of action is to remain conservative from vascular surgery  standpoint.  Overall he is doing okay with his bilateral lower extremity swelling.  He does have the tools to help aid with his swelling, and utilizes them at his leisure.  He reports exercising more frequently.  He did arrive to the office on a scooter due to the extensive walking in the hospital.  His VVI is negative for venous insufficiency to his right lower extremity and minimal reflux was located in his SFJ.  Chronic extensive DVT bilaterally was noted.  He is maintained on Eliquis 5 mg twice daily, he is no longer taking Lipitor or any cholesterol medication at this time.  I did discuss vascular risk factors as they pertain to the progression of vascular disease including controlling his hypertension, hyperlipidemia, and diabetes.  His blood pressure stable today in office.  His last lipid panel 6 months ago shows total cholesterol 119, triglycerides 164, HDL 19, LDL 67.  His most recent hemoglobin A1c is controlled at 5.1% in June 2024.  We will see him back in 1 year with noninvasive testing to include carotid duplex for continued surveillance. The patient can continue taking their current medication regimen as previously planned.  Body mass index is 32.55 kg/m².  BMI is >= 30 and <35. (Class 1 Obesity). The following options were offered after discussion;: exercise counseling/recommendations and nutrition counseling/recommendations.     This was all discussed in full with complete understanding.    Thank you for allowing me to participate in the care of your patient.  Please do not hesitate with any questions or concerns.  I will keep you aware of any further encounters with Danilo Mora.        Sincerely yours,         ADITYA Manning

## 2025-01-14 DIAGNOSIS — E03.9 HYPOTHYROIDISM, UNSPECIFIED TYPE: ICD-10-CM

## 2025-01-14 LAB — TSH SERPL DL<=0.005 MIU/L-ACNC: 72.81 UIU/ML (ref 0.27–4.2)

## 2025-01-16 ENCOUNTER — TELEPHONE (OUTPATIENT)
Dept: HEMATOLOGY | Age: 55
End: 2025-01-16

## 2025-01-16 DIAGNOSIS — E03.2 HYPOTHYROIDISM DUE TO MEDICATION: Primary | ICD-10-CM

## 2025-01-16 NOTE — TELEPHONE ENCOUNTER
Spoke with Fracisco regarding TSH and repeat labs. He will go to [facility] on 2/14/2025 for repeat labs.

## 2025-01-22 ENCOUNTER — TELEPHONE (OUTPATIENT)
Dept: OTOLARYNGOLOGY | Facility: CLINIC | Age: 55
End: 2025-01-22
Payer: COMMERCIAL

## 2025-01-22 NOTE — TELEPHONE ENCOUNTER
Caller: COOPER GRAHAM    Relationship to patient: SELF    Best call back number:     [phone]       Patient is needing: PT CALLED IN TO R/S F/U APPT.  FIRST AVAIL FOR HUB IS MARCH.  PT WAS VERY DISAPPOINTED AT WAITING THIS LONG.  TOLD PT I WOULD LET YOUR TEAM KNOW IT IS THE SOONEST APPT [facility] CAN SCHEDULE

## 2025-02-07 ENCOUNTER — TELEPHONE (OUTPATIENT)
Dept: OTOLARYNGOLOGY | Facility: CLINIC | Age: 55
End: 2025-02-07
Payer: COMMERCIAL

## 2025-02-07 NOTE — TELEPHONE ENCOUNTER
Hub staff attempted to follow warm transfer process and was unsuccessful     Caller: Danilo Mora    Relationship to patient: Self    Best call back number: [phone] (home)       Patient is needing: HAVING OTHER MEDICAL ISSUES. HOW LONG CAN I PUT OFF MY INSPIRE SURGERY WITHOUT STARTING OVER?

## 2025-02-09 DIAGNOSIS — C64.9 RENAL CELL CARCINOMA OF KIDNEY EXCLUDING RENAL PELVIS: Primary | ICD-10-CM

## 2025-02-10 ENCOUNTER — TELEPHONE (OUTPATIENT)
Dept: HEMATOLOGY | Age: 55
End: 2025-02-10

## 2025-02-10 DIAGNOSIS — D68.51 FACTOR 5 LEIDEN MUTATION, HETEROZYGOUS (HCC): Primary | ICD-10-CM

## 2025-02-10 DIAGNOSIS — I82.4Z3 DVT, LOWER EXTREMITY, DISTAL, ACUTE, BILATERAL (HCC): Primary | ICD-10-CM

## 2025-02-10 DIAGNOSIS — D50.9 MICROCYTIC ANEMIA: ICD-10-CM

## 2025-02-10 RX ORDER — APIXABAN 5 MG/1
5 TABLET, FILM COATED ORAL 2 TIMES DAILY
Qty: 60 TABLET | Refills: 0 | OUTPATIENT
Start: 2025-02-10

## 2025-02-10 NOTE — TELEPHONE ENCOUNTER
.  Requested Prescriptions     Pending Prescriptions Disp Refills    ELIQUIS 5 MG TABS tablet [Pharmacy Med Name: Eliquis 5 MG Oral Tablet] 60 tablet 0     Sig: Take 1 tablet by mouth twice daily       Last Office Visit: Visit date not found  Next Office Visit: Visit date not found  Last Medication Refill: 10/14/24

## 2025-02-10 NOTE — TELEPHONE ENCOUNTER
Pt called to request refill again for eliquis. Per late note from provider, pcp should fill. Pt is unsure why. Please call pt to advise.

## 2025-02-10 NOTE — TELEPHONE ENCOUNTER
Returned phone call to patient. Patient advised that he has other medical issues going on and needs to postpone inspire and appts with Dr. Martinez until he gets everything else handled.  Appt cancelled

## 2025-02-26 ENCOUNTER — OFFICE VISIT (OUTPATIENT)
Dept: CARDIOLOGY CLINIC | Age: 55
End: 2025-02-26
Payer: COMMERCIAL

## 2025-02-26 VITALS
HEIGHT: 72 IN | DIASTOLIC BLOOD PRESSURE: 68 MMHG | WEIGHT: 189.6 LBS | HEART RATE: 84 BPM | SYSTOLIC BLOOD PRESSURE: 98 MMHG | BODY MASS INDEX: 25.68 KG/M2

## 2025-02-26 DIAGNOSIS — I25.10 CORONARY ARTERY CALCIFICATION: ICD-10-CM

## 2025-02-26 DIAGNOSIS — I10 PRIMARY HYPERTENSION: Primary | ICD-10-CM

## 2025-02-26 DIAGNOSIS — D50.9 MICROCYTIC ANEMIA: ICD-10-CM

## 2025-02-26 DIAGNOSIS — R06.09 DOE (DYSPNEA ON EXERTION): ICD-10-CM

## 2025-02-26 DIAGNOSIS — E03.2 HYPOTHYROIDISM DUE TO MEDICATION: ICD-10-CM

## 2025-02-26 DIAGNOSIS — R06.02 SHORTNESS OF BREATH: ICD-10-CM

## 2025-02-26 DIAGNOSIS — D68.51 FACTOR 5 LEIDEN MUTATION, HETEROZYGOUS: ICD-10-CM

## 2025-02-26 LAB
ALBUMIN SERPL-MCNC: 4.1 G/DL (ref 3.5–5.2)
ALP SERPL-CCNC: 47 U/L (ref 40–129)
ALT SERPL-CCNC: 7 U/L (ref 5–41)
ANION GAP SERPL CALCULATED.3IONS-SCNC: 15 MMOL/L (ref 8–16)
AST SERPL-CCNC: 20 U/L (ref 5–40)
BASOPHILS # BLD: 0.1 K/UL (ref 0–0.2)
BASOPHILS NFR BLD: 0.9 % (ref 0–1)
BILIRUB SERPL-MCNC: 1.1 MG/DL (ref 0.2–1.2)
BUN SERPL-MCNC: 20 MG/DL (ref 6–20)
CALCIUM SERPL-MCNC: 9.4 MG/DL (ref 8.6–10)
CHLORIDE SERPL-SCNC: 97 MMOL/L (ref 98–107)
CO2 SERPL-SCNC: 25 MMOL/L (ref 22–29)
CREAT SERPL-MCNC: 1.1 MG/DL (ref 0.7–1.2)
EOSINOPHIL # BLD: 0.3 K/UL (ref 0–0.6)
EOSINOPHIL NFR BLD: 4.3 % (ref 0–5)
ERYTHROCYTE [DISTWIDTH] IN BLOOD BY AUTOMATED COUNT: 12.3 % (ref 11.5–14.5)
GLUCOSE SERPL-MCNC: 143 MG/DL (ref 70–99)
HCT VFR BLD AUTO: 36.9 % (ref 42–52)
HGB BLD-MCNC: 11.9 G/DL (ref 14–18)
IMM GRANULOCYTES # BLD: 0 K/UL
LYMPHOCYTES # BLD: 2.7 K/UL (ref 1.1–4.5)
LYMPHOCYTES NFR BLD: 38.4 % (ref 20–40)
MCH RBC QN AUTO: 29 PG (ref 27–31)
MCHC RBC AUTO-ENTMCNC: 32.2 G/DL (ref 33–37)
MCV RBC AUTO: 89.8 FL (ref 80–94)
MONOCYTES # BLD: 0.5 K/UL (ref 0–0.9)
MONOCYTES NFR BLD: 7.5 % (ref 0–10)
NEUTROPHILS # BLD: 3.4 K/UL (ref 1.5–7.5)
NEUTS SEG NFR BLD: 48.6 % (ref 50–65)
PLATELET # BLD AUTO: 177 K/UL (ref 130–400)
PMV BLD AUTO: 11.1 FL (ref 9.4–12.4)
POTASSIUM SERPL-SCNC: 4 MMOL/L (ref 3.5–5.1)
PROT SERPL-MCNC: 7.2 G/DL (ref 6.4–8.3)
RBC # BLD AUTO: 4.11 M/UL (ref 4.7–6.1)
SODIUM SERPL-SCNC: 137 MMOL/L (ref 136–145)
TSH SERPL DL<=0.005 MIU/L-ACNC: 28.68 UIU/ML (ref 0.27–4.2)
WBC # BLD AUTO: 7 K/UL (ref 4.8–10.8)

## 2025-02-26 PROCEDURE — 3078F DIAST BP <80 MM HG: CPT | Performed by: CLINICAL NURSE SPECIALIST

## 2025-02-26 PROCEDURE — 3074F SYST BP LT 130 MM HG: CPT | Performed by: CLINICAL NURSE SPECIALIST

## 2025-02-26 PROCEDURE — 99214 OFFICE O/P EST MOD 30 MIN: CPT | Performed by: CLINICAL NURSE SPECIALIST

## 2025-02-26 NOTE — PROGRESS NOTES
BILITOT 1.1 02/26/2025    ALKPHOS 47 02/26/2025    AST 20 02/26/2025    ALT 7 02/26/2025    LABGLOM 78 02/26/2025    GLOB 2.6 05/06/2024       Lab Results   Component Value Date    WBC 7.0 02/26/2025    HGB 11.9 (L) 02/26/2025    HCT 36.9 (L) 02/26/2025    MCV 89.8 02/26/2025     02/26/2025               Plan    Will watch for lab results and decide about any testing after the thyroid is regulated.     Continue anticoagulation with Eliquis  Maintain good blood pressure control-goal<130/80 at rest  Maintain good cholesterol control LDL goal<70 with arterial disease  If you are diabetic work to keep/obtain hemoglobin A1c< 7      Call with any questions or concerns  Follow up with PCP for non cardiac problems  Report any new problems  Cardiovascular Fitness-Exercise as tolerated.    Continue current medications as directed  Continue plan of treatment        I appreciate the opportunity of participating in the care and treatment of this patient.     KIET Mcguire    EMR dragon/transcription disclaimer: Much of this encounter note is electronic transcription/translation of spoken language to printed tach. Electronic translation of spoken language may be erroneous, or at times, nonsensical words or phrases may be inadvertently transcribed. Although, I have reviewed the note for such errors, some may still exist.      Cc:  Preston Dexter MD

## 2025-02-26 NOTE — PATIENT INSTRUCTIONS
Will watch for lab results and decide about any testing after the thyroid is regulated.     Continue anticoagulation with Eliquis  Maintain good blood pressure control-goal<130/80 at rest  Maintain good cholesterol control LDL goal<70 with arterial disease  If you are diabetic work to keep/obtain hemoglobin A1c< 7      Call with any questions or concerns  Follow up with PCP for non cardiac problems  Report any new problems  Cardiovascular Fitness-Exercise as tolerated.    Continue current medications as directed  Continue plan of treatment

## 2025-03-27 RX ORDER — LEVOTHYROXINE SODIUM 100 UG/1
100 TABLET ORAL DAILY
Qty: 30 TABLET | Refills: 3 | Status: SHIPPED | OUTPATIENT
Start: 2025-03-27

## 2025-05-01 RX ORDER — LEVOTHYROXINE SODIUM 100 UG/1
100 TABLET ORAL DAILY
Qty: 30 TABLET | Refills: 3 | Status: SHIPPED | OUTPATIENT
Start: 2025-05-01

## 2025-06-25 ENCOUNTER — HOSPITAL ENCOUNTER (OUTPATIENT)
Dept: CT IMAGING | Age: 55
Discharge: HOME | End: 2025-06-25
Payer: MEDICARE

## 2025-06-25 DIAGNOSIS — Z85.528 HISTORY OF RENAL CELL CARCINOMA: ICD-10-CM

## 2025-06-25 PROCEDURE — 71250 CT THORAX DX C-: CPT

## 2025-06-25 PROCEDURE — 74176 CT ABD & PELVIS W/O CONTRAST: CPT

## 2025-07-03 ENCOUNTER — TELEPHONE (OUTPATIENT)
Dept: HEMATOLOGY | Age: 55
End: 2025-07-03

## 2025-07-03 NOTE — TELEPHONE ENCOUNTER

## 2025-07-08 DIAGNOSIS — I82.503 CHRONIC DEEP VEIN THROMBOSIS (DVT) OF BOTH LOWER EXTREMITIES, UNSPECIFIED VEIN (HCC): ICD-10-CM

## 2025-07-08 DIAGNOSIS — Z13.29 SCREENING FOR THYROID DISORDER: ICD-10-CM

## 2025-07-08 DIAGNOSIS — Z85.528 HISTORY OF RENAL CELL CARCINOMA: Primary | ICD-10-CM

## 2025-07-08 DIAGNOSIS — D68.51 FACTOR 5 LEIDEN MUTATION, HETEROZYGOUS: ICD-10-CM

## 2025-07-08 NOTE — PROGRESS NOTES
mcg  T4, Free-0.10    Serology 1/14/2025  TSH-72.81    Serology 2/26/2025  TSH-28.680    PLAN  Recheck TSH, T4  Continue levothyroxine 100 mcg daily for now       4.  Chronic renal insufficiency-history of nephrectomy-not at goal        PLAN  Recheck renal function  CT imaging performed without contrast    5.  Microcytic anemia-improvement not at goal    Serology 12/17/2025  Serum FE-91  TIBC-214  FE sat-43%  Ferritin-544.4        Prior colonoscopy was 3/25/2022.  1 year follow-up was recommended due to the number of polyps this was discussed today.  He says he cannot tolerate the prep therefore he does not want to consider having it done at this time.      Iron serology last visit was within normal limits.  He does have significant hypothyroidism improving on replacement.  Further anemia serology requested.  Hemoglobin actually improved slightly to 12.      PLAN  Check serology as outlined  Recheck CBC in a shorter timeframe  Check on prior GI evaluation    6.  Coronary calcifications    CT of the chest on 6/30/2025 revealed multiple coronary artery calcifications-severe.  He is wanting to see cardiology referral made-he is established with [facility] cardiology.        The patient (or guardian, if applicable) and other individuals in attendance with the patient were advised that Artificial Intelligence will be utilized during this visit to record, process the conversation to generate a clinical note, and support improvement of the AI technology. The patient (or guardian, if applicable) and other individuals in attendance at the appointment consented to the use of AI, including the recording.        HEALTH MAINTENANCE    Colon cancer screening.  Colonoscopy per Dr. Connor 3/25/2022 revealed 5 small sessile benign-appearing polyps in the ascending colon ranging from 5 to 7 mm (PATH tubular adenomas without any high-grade dysplasia), a 2 cm diameter pedunculated polyp (PATH tubulovillous adenoma negative for

## 2025-07-09 ENCOUNTER — OFFICE VISIT (OUTPATIENT)
Dept: HEMATOLOGY | Age: 55
End: 2025-07-09
Payer: MEDICARE

## 2025-07-09 ENCOUNTER — HOSPITAL ENCOUNTER (OUTPATIENT)
Dept: INFUSION THERAPY | Age: 55
Discharge: HOME | End: 2025-07-09
Payer: MEDICARE

## 2025-07-09 ENCOUNTER — RESULTS FOLLOW-UP (OUTPATIENT)
Dept: HEMATOLOGY | Age: 55
End: 2025-07-09

## 2025-07-09 VITALS
SYSTOLIC BLOOD PRESSURE: 112 MMHG | TEMPERATURE: 97.8 F | HEIGHT: 72 IN | OXYGEN SATURATION: 96 % | BODY MASS INDEX: 23.7 KG/M2 | WEIGHT: 175 LBS | HEART RATE: 80 BPM | DIASTOLIC BLOOD PRESSURE: 58 MMHG

## 2025-07-09 DIAGNOSIS — Z12.5 ENCOUNTER FOR SCREENING FOR MALIGNANT NEOPLASM OF PROSTATE: ICD-10-CM

## 2025-07-09 DIAGNOSIS — Z13.29 SCREENING FOR THYROID DISORDER: ICD-10-CM

## 2025-07-09 DIAGNOSIS — I82.4Z3 DVT, LOWER EXTREMITY, DISTAL, ACUTE, BILATERAL (HCC): ICD-10-CM

## 2025-07-09 DIAGNOSIS — Z79.01 CHRONIC ANTICOAGULATION: ICD-10-CM

## 2025-07-09 DIAGNOSIS — Z85.528 HISTORY OF RENAL CELL CARCINOMA: Primary | ICD-10-CM

## 2025-07-09 DIAGNOSIS — Z85.528 HISTORY OF RENAL CELL CARCINOMA: ICD-10-CM

## 2025-07-09 DIAGNOSIS — I82.503 CHRONIC DEEP VEIN THROMBOSIS (DVT) OF BOTH LOWER EXTREMITIES, UNSPECIFIED VEIN (HCC): ICD-10-CM

## 2025-07-09 DIAGNOSIS — E03.2 HYPOTHYROIDISM DUE TO MEDICATION: ICD-10-CM

## 2025-07-09 DIAGNOSIS — D64.9 NORMOCYTIC ANEMIA: Primary | ICD-10-CM

## 2025-07-09 DIAGNOSIS — D50.9 MICROCYTIC ANEMIA: ICD-10-CM

## 2025-07-09 DIAGNOSIS — D68.51 FACTOR 5 LEIDEN MUTATION, HETEROZYGOUS: ICD-10-CM

## 2025-07-09 DIAGNOSIS — D64.9 NORMOCYTIC ANEMIA: ICD-10-CM

## 2025-07-09 DIAGNOSIS — I51.5 CARDIAC CALCIFICATION (HCC): ICD-10-CM

## 2025-07-09 PROBLEM — N17.9 AKI (ACUTE KIDNEY INJURY): Status: RESOLVED | Noted: 2023-08-15 | Resolved: 2025-07-09

## 2025-07-09 LAB
ALBUMIN SERPL-MCNC: 3.9 G/DL (ref 3.5–5.2)
ALP SERPL-CCNC: 51 U/L (ref 40–129)
ALT SERPL-CCNC: 10 U/L (ref 5–41)
ANION GAP SERPL CALCULATED.3IONS-SCNC: 11 MMOL/L (ref 7–19)
AST SERPL-CCNC: 21 U/L (ref 5–40)
BASOPHILS # BLD: 0.07 K/UL (ref 0–0.2)
BASOPHILS NFR BLD: 1 % (ref 0–1)
BILIRUB SERPL-MCNC: 0.9 MG/DL (ref 0–1.2)
BUN SERPL-MCNC: 13 MG/DL (ref 6–20)
CALCIUM SERPL-MCNC: 8.8 MG/DL (ref 8.6–10)
CHLORIDE SERPL-SCNC: 101 MMOL/L (ref 98–107)
CO2 SERPL-SCNC: 27 MMOL/L (ref 22–29)
CREAT SERPL-MCNC: 1 MG/DL (ref 0.7–1.2)
EOSINOPHIL # BLD: 0.35 K/UL (ref 0–0.6)
EOSINOPHIL NFR BLD: 5.2 % (ref 0–5)
ERYTHROCYTE [DISTWIDTH] IN BLOOD BY AUTOMATED COUNT: 13.6 % (ref 11.5–14.5)
FOLATE SERPL-MCNC: 26.8 NG/ML (ref 4.5–32.2)
GLUCOSE SERPL-MCNC: 104 MG/DL (ref 70–99)
HAPTOGLOB SERPL-MCNC: 76.7 MG/DL (ref 30–200)
HCT VFR BLD AUTO: 35.4 % (ref 42–52)
HGB BLD-MCNC: 12 G/DL (ref 14–18)
LDH SERPL-CCNC: 145 U/L (ref 135–225)
LYMPHOCYTES # BLD: 2.25 K/UL (ref 1.1–4.5)
LYMPHOCYTES NFR BLD: 33.3 % (ref 20–40)
MCH RBC QN AUTO: 28.6 PG (ref 27–31)
MCHC RBC AUTO-ENTMCNC: 33.9 G/DL (ref 33–37)
MCV RBC AUTO: 84.3 FL (ref 80–94)
MONOCYTES # BLD: 0.53 K/UL (ref 0–0.9)
MONOCYTES NFR BLD: 7.8 % (ref 1–10)
NEUTROPHILS # BLD: 3.55 K/UL (ref 1.5–7.5)
NEUTS SEG NFR BLD: 52.6 % (ref 50–65)
PLATELET # BLD AUTO: 155 K/UL (ref 130–400)
PMV BLD AUTO: 9.6 FL (ref 9.4–12.4)
POTASSIUM SERPL-SCNC: 4.1 MMOL/L (ref 3.5–5.1)
PROT SERPL-MCNC: 6.6 G/DL (ref 6.4–8.3)
PSA SERPL-MCNC: 0.89 NG/ML (ref 0–4)
RBC # BLD AUTO: 4.2 M/UL (ref 4.7–6.1)
RETICS # AUTO: 0.07 M/UL (ref 0.03–0.12)
RETICS/RBC NFR: 1.61 % (ref 0.5–1.5)
SODIUM SERPL-SCNC: 139 MMOL/L (ref 136–145)
T4 FREE SERPL-MCNC: 0.81 NG/DL (ref 0.93–1.7)
TSH SERPL DL<=0.005 MIU/L-ACNC: 50.6 UIU/ML (ref 0.27–4.2)
VIT B12 SERPL-MCNC: 734 PG/ML (ref 232–1245)
WBC # BLD AUTO: 6.76 K/UL (ref 4.8–10.8)

## 2025-07-09 PROCEDURE — 83010 ASSAY OF HAPTOGLOBIN QUANT: CPT

## 2025-07-09 PROCEDURE — 84439 ASSAY OF FREE THYROXINE: CPT

## 2025-07-09 PROCEDURE — 80053 COMPREHEN METABOLIC PANEL: CPT

## 2025-07-09 PROCEDURE — 99215 OFFICE O/P EST HI 40 MIN: CPT | Performed by: PHYSICIAN ASSISTANT

## 2025-07-09 PROCEDURE — 85025 COMPLETE CBC W/AUTO DIFF WBC: CPT

## 2025-07-09 PROCEDURE — 3078F DIAST BP <80 MM HG: CPT | Performed by: PHYSICIAN ASSISTANT

## 2025-07-09 PROCEDURE — 84153 ASSAY OF PSA TOTAL: CPT

## 2025-07-09 PROCEDURE — 85045 AUTOMATED RETICULOCYTE COUNT: CPT

## 2025-07-09 PROCEDURE — G2211 COMPLEX E/M VISIT ADD ON: HCPCS | Performed by: PHYSICIAN ASSISTANT

## 2025-07-09 PROCEDURE — 83615 LACTATE (LD) (LDH) ENZYME: CPT

## 2025-07-09 PROCEDURE — 3074F SYST BP LT 130 MM HG: CPT | Performed by: PHYSICIAN ASSISTANT

## 2025-07-09 PROCEDURE — 99213 OFFICE O/P EST LOW 20 MIN: CPT

## 2025-07-09 PROCEDURE — 82746 ASSAY OF FOLIC ACID SERUM: CPT

## 2025-07-09 PROCEDURE — 36415 COLL VENOUS BLD VENIPUNCTURE: CPT

## 2025-07-09 PROCEDURE — 82607 VITAMIN B-12: CPT

## 2025-07-09 PROCEDURE — 84443 ASSAY THYROID STIM HORMONE: CPT

## 2025-07-09 ASSESSMENT — ENCOUNTER SYMPTOMS
EYE ITCHING: 0
SHORTNESS OF BREATH: 0
TROUBLE SWALLOWING: 0
DIARRHEA: 0
ABDOMINAL DISTENTION: 0
VOMITING: 0
ABDOMINAL PAIN: 0
EYE DISCHARGE: 0
NAUSEA: 0
VOICE CHANGE: 0
COLOR CHANGE: 0
COUGH: 0
WHEEZING: 0
PHOTOPHOBIA: 0
BACK PAIN: 0
SORE THROAT: 0
BLOOD IN STOOL: 0
CONSTIPATION: 0

## 2025-07-10 DIAGNOSIS — E03.9 HYPOTHYROIDISM, UNSPECIFIED TYPE: Primary | ICD-10-CM

## 2025-07-10 RX ORDER — LEVOTHYROXINE SODIUM 150 UG/1
150 TABLET ORAL DAILY
Qty: 90 TABLET | Refills: 0 | Status: SHIPPED | OUTPATIENT
Start: 2025-07-10

## 2025-07-10 NOTE — TELEPHONE ENCOUNTER
Spoke with Fracisco regarding labs. I explained a new prescription for 150 mcg levothyroxine for him to start taking will be sent to [facility] and instructed him to stop taking the 100 mcg. Get a follow-up TSH obtained in 2 months in [facility]. He VU.

## 2025-07-10 NOTE — TELEPHONE ENCOUNTER
----- Message from Fracisco Adame PA-C sent at 7/9/2025  4:42 PM CDT -----  Send in a new prescription for 150 mcg levothyroxine for him to start taking.  Tell him to stop taking the 100 mcg.  Get a follow-up TSH obtained in 2 months-could have this in [facility] if needed

## 2025-07-11 ENCOUNTER — TELEPHONE (OUTPATIENT)
Dept: HEMATOLOGY | Age: 55
End: 2025-07-11

## 2025-07-11 NOTE — TELEPHONE ENCOUNTER
Called patient and left vmail about appt with cardiology for 7/15 @ 3:15PM. Advised of address and ph number to office

## 2025-07-28 ENCOUNTER — TELEPHONE (OUTPATIENT)
Dept: PHARMACY | Facility: CLINIC | Age: 55
End: 2025-07-28

## 2025-07-28 NOTE — TELEPHONE ENCOUNTER
[facility] CLINICAL PHARMACY: STATIN THERAPY REVIEW  Identified Statin Use in Cardiovascular Disease care gap per [place] Records dated: 7/7/25    Statin Use in Patients with Cardiovascular Disease Definition:   Eligible population:Males 21-75 years of age and females 40-75 years of age as of December 31 of the measurement year    Definition:The percentage of males 21-75 years of age and females 40-75 years of age as of December 31 of the measurement year, diagnosed with clinical atherosclerotic cardiovascular disease (ASCVD) and were dispensed at least one high or moderate-intensity statin medication during the measurement yea    Compliance:Prescribing a high or moderate intensity statin, as appropriate, member must use their insurance card to fill one of the statins or statin combination medications through the last day of the measurement year.    Last Visit: 6/18/2024 ([facility] PCP: Isacc) ; 2/26/2025 ([facility] Cardiology: Zoya)  Next Visit: No future appointments.  Per LOV, noted patient should return to clinic unspecified; 7/30/2025 ([facility]: Zoya)          STATIN GAP IDENTIFIED-SPC    ASCVD History: Bilateral carotid artery stenosis Coronary artery calcification; Atherosclerosis (per 9/17/24 [facility] note)    Other Relevant PMH: Hyperlipidemia, unspecified hyperlipidemia type; Type 2 diabetes mellitus without complication, unspecified whether long term insulin use;     Patient prescribed a statin:yes - Atorvastatin  Per chart review:   Statin ATORVASTATIN 20 mg on medication list. Not filled due to: Per EMR, noted not taking on 7/9/2025, but several medications are noted as not taking currently.  Per 12/20/2024 Encounter: \"he is no longer taking Lipitor or any cholesterol medication at this time. I did discuss vascular risk factors as they pertain to the progression of vascular disease including controlling his hypertension, hyperlipidemia, and diabetes.\"  Prior tried/failed:

## 2025-07-30 ENCOUNTER — OFFICE VISIT (OUTPATIENT)
Dept: CARDIOLOGY CLINIC | Age: 55
End: 2025-07-30
Payer: MEDICARE

## 2025-07-30 VITALS
DIASTOLIC BLOOD PRESSURE: 68 MMHG | WEIGHT: 175 LBS | SYSTOLIC BLOOD PRESSURE: 100 MMHG | HEIGHT: 71 IN | BODY MASS INDEX: 24.5 KG/M2

## 2025-07-30 DIAGNOSIS — E78.2 MIXED HYPERLIPIDEMIA: Primary | ICD-10-CM

## 2025-07-30 DIAGNOSIS — E78.5 HYPERLIPIDEMIA, UNSPECIFIED HYPERLIPIDEMIA TYPE: ICD-10-CM

## 2025-07-30 DIAGNOSIS — I25.10 CORONARY ARTERY CALCIFICATION: ICD-10-CM

## 2025-07-30 PROCEDURE — 93000 ELECTROCARDIOGRAM COMPLETE: CPT | Performed by: CLINICAL NURSE SPECIALIST

## 2025-07-30 PROCEDURE — 3074F SYST BP LT 130 MM HG: CPT | Performed by: CLINICAL NURSE SPECIALIST

## 2025-07-30 PROCEDURE — 3078F DIAST BP <80 MM HG: CPT | Performed by: CLINICAL NURSE SPECIALIST

## 2025-07-30 PROCEDURE — 99214 OFFICE O/P EST MOD 30 MIN: CPT | Performed by: CLINICAL NURSE SPECIALIST

## 2025-07-30 RX ORDER — ATORVASTATIN CALCIUM 20 MG/1
20 TABLET, FILM COATED ORAL DAILY
Qty: 90 TABLET | Refills: 1 | Status: SHIPPED | OUTPATIENT
Start: 2025-07-30

## 2025-07-30 NOTE — PROGRESS NOTES
[facility], [facility]  [address] [facility], [facility]  [address]  Phone: [phone]  Fax: [phone]    OFFICE VISIT:  2025    Fracisco Reeves - : 1970    Dear Ofelia Dexter and Melanie,     I appreciate the opportunity of participating in the care of Fracisco Reeves.  He is a very pleasant 55 y.o. male who I had the opportunity of seeing in my office today, 25.  Records from your office have been obtained and reviewed.    Reason For Visit:  Fracisco is a 55 y.o. male who is here for Follow-up (Pt has low blood pressure) and Hypertension  Patient has history of diabetes, hypertension, hyperlipidemia and stage III renal cell carcinoma with surgery and chemotherapy.  Patient had IVC thrombectomy at time of his nephrectomy   He has finished his immunotherapy     DVTs developed  and following with vascular surgery for this.  Patient was found to have coronary calcification on CT of his chest.    He was seen for initial evaluation  2024 due to calcifications noted on his CT scan      2024 had dobutamine stress echo that showed no evidence of myocardial ischemia  2D echo showed normal LV size and function with EF 55 to 60% with mild concentric LVH.  No significant valvular disease noted    Treated for peripheral lymphedema that has improved.  Severely hypothyroid and has been on thyroid replacement.  Had follow-up with oncology recently.  [facility]'s CAT scan showed coronary calcifications    They are here today for reevaluation.  He continues to have profound fatigue but thyroid medication was recently adjusted.  He denies any cardiac complaints      Subjective  Fracisco has no exertional chest pain, pressure, burning or squeezing.  He is able to lie flat without evidence of orthopnea or paroxysmal nocturnal dyspnea.  No symptomatic tachy- or kandy-arrhythmia.  No numbness or weakness to suggest cerebrovascular accident or transient ischemic attack.      Fracisco Reeves has

## 2025-07-30 NOTE — PATIENT INSTRUCTIONS
As the thyroid is regulated watch for worsening activity tolerance or any worsening chest pain or SOB    Continue anticoagulation with Eliquis  Maintain good blood pressure control-goal<130/80 at rest  Maintain good cholesterol control LDL goal<70 with arterial disease  If you are diabetic work to keep/obtain hemoglobin A1c< 7      Call with any questions or concerns  Follow up with PCP for non cardiac problems  Report any new problems  Cardiovascular Fitness-Exercise as tolerated.    Continue current medications as directed  Continue plan of treatment

## 2025-07-30 NOTE — TELEPHONE ENCOUNTER
Yanni Kenny APRN, from below, appointment with you today 7/30/25 - identified with a care gap for Cardiovascular Disease and Diabetes without a Statin.      Would patient benefit from statin therapy?  Per patient chart review:   Statin ATORVASTATIN 20 mg on medication list. Not filled due to: Per EMR, noted not taking on 7/9/2025, but several medications are noted as not taking currently.    21-76 yo Males has:Patient with Clinical ASCVD and Diabetes with LDL >55 mg/dL (67mg/dL 2024 while on statin) (ADA 2023)    LDL Target goal: <55 mg/dL -Aged 40-76 yo with Diabetes with Atherosclerotic Cardiovascular disease and reduced LDL of >/= 50% (ADA 2023)    Recommendation: Patient with Diabetes with ASCVD-  treatment with high-intensity statin therapy is recommended to target an LDL cholesterol reduction of >/= 50% from baseline and an LDL cholesterol goal of <55mg/dL. Addition of ezetimibe or a PCSK9 inhibitor with proven benefit in this population is recommended if this goal is not achieved on maximum tolerated statin therapy (ADA 2023)  Recommend retrial of a different statin at this office visit.        If appropriate, please consider using the following CMS eligible diagnosis within your visit encounter:  Statin myopathy (G72.0, T46.6X5A)  M79.10 (Myalgia, unspecified site)  -- can only be used for SPC measure (statin use in CV disease) and NOT SUPD measure (statin use in diabetes)      This will complete/close this insurer-identified gap for this calendar year.      Other ICD codes for statin measure exclusion:    If patient is not a candidate for statin therapy due to any of the following, please place a diagnosis code in a billable 2025 visit to remove the patient from this year's care gap:     Code Description   M79.10, M79.11, M79.12, M79.18 Myalgia, unspecified site;  Myalgia, specified site(s) -- will not close gap for SUPD measure, only SPC measure.   G72.0  G72.9  G72.89 Drug-induced myopathy  Myopathy,

## 2025-07-31 NOTE — TELEPHONE ENCOUNTER
Noted, thank you for review and response      Atorvastatin 20mg prescribed and sent to [facility].      Adela Alan, PharmD., BCACP  Population Health Pharmacist  [facility]  Department, toll free: [phone], option 1  =======================================================  For Pharmacy Admin Tracking Only    Program: [employer]  CPA in place:  No  Recommendation Provided To: Provider: 1 via Note to Provider  Intervention Detail: Adherence Monitorin  Intervention Accepted By: Provider: 1  Gap Closed?: Yes   Time Spent (min): 20

## 2025-09-05 DIAGNOSIS — E03.9 HYPOTHYROIDISM, UNSPECIFIED TYPE: ICD-10-CM

## 2025-09-05 RX ORDER — LEVOTHYROXINE SODIUM 150 UG/1
150 TABLET ORAL DAILY
Qty: 90 TABLET | Refills: 0 | Status: SHIPPED | OUTPATIENT
Start: 2025-09-05

## (undated) DEVICE — BRONCH VIDEO GLIDESCOPE BFLEX/2 2.8MM ULTR/SLIM 1P/U

## (undated) DEVICE — SNARE POLYP SM AD W13MMXL240CM SHTH DIA2.4MM HEX STIFF

## (undated) DEVICE — Z DUPLICATE USE 2738952 SYSTEM VENT M AD NSL PAP DEV HD STRP 2L HYPRINFL BG MRI

## (undated) DEVICE — SURGICAL SUCTION CONNECTING TUBE WITH MALE CONNECTOR AND SUCTION CLAMP, 2 FT. LONG (.6 M), 5 MM I.D.: Brand: CONMED

## (undated) DEVICE — 4-PORT MANIFOLD: Brand: NEPTUNE 2

## (undated) DEVICE — ENDO KIT,LOURDES HOSPITAL: Brand: MEDLINE INDUSTRIES, INC.

## (undated) DEVICE — KIT,ANTI FOG,W/SPONGE & FLUID,SOFT PACK: Brand: MEDLINE

## (undated) DEVICE — POSITIONER,HEAD,MULTIRING,36CS: Brand: MEDLINE

## (undated) DEVICE — TOWEL,OR,DSP,ST,BLUE,STD,4/PK,20PK/CS: Brand: MEDLINE

## (undated) DEVICE — TUBING, SUCTION, 1/4" X 12', STRAIGHT: Brand: MEDLINE